# Patient Record
Sex: MALE | Race: WHITE | Employment: UNEMPLOYED | URBAN - METROPOLITAN AREA
[De-identification: names, ages, dates, MRNs, and addresses within clinical notes are randomized per-mention and may not be internally consistent; named-entity substitution may affect disease eponyms.]

---

## 2020-12-21 ENCOUNTER — APPOINTMENT (OUTPATIENT)
Dept: GENERAL RADIOLOGY | Age: 55
DRG: 246 | End: 2020-12-21

## 2020-12-21 ENCOUNTER — HOSPITAL ENCOUNTER (INPATIENT)
Age: 55
LOS: 9 days | Discharge: HOME | DRG: 246 | End: 2020-12-30
Attending: EMERGENCY MEDICINE | Admitting: FAMILY MEDICINE

## 2020-12-21 DIAGNOSIS — I50.9 CONGESTIVE HEART FAILURE, UNSPECIFIED HF CHRONICITY, UNSPECIFIED HEART FAILURE TYPE (HCC): ICD-10-CM

## 2020-12-21 DIAGNOSIS — I48.91 ATRIAL FIBRILLATION, UNSPECIFIED TYPE (HCC): Primary | ICD-10-CM

## 2020-12-21 PROBLEM — K59.00 CONSTIPATION: Status: ACTIVE | Noted: 2020-12-21

## 2020-12-21 PROBLEM — R79.89 TSH ELEVATION: Status: ACTIVE | Noted: 2020-12-21

## 2020-12-21 PROBLEM — R79.89 ELEVATED TROPONIN: Status: ACTIVE | Noted: 2020-12-21

## 2020-12-21 PROBLEM — R77.8 ELEVATED TROPONIN: Status: ACTIVE | Noted: 2020-12-21

## 2020-12-21 PROBLEM — E66.813 CLASS 3 SEVERE OBESITY DUE TO EXCESS CALORIES WITH BODY MASS INDEX (BMI) OF 40.0 TO 44.9 IN ADULT (HCC): Status: ACTIVE | Noted: 2020-12-21

## 2020-12-21 PROBLEM — R60.9 EDEMA: Status: ACTIVE | Noted: 2020-12-21

## 2020-12-21 PROBLEM — R60.1 ANASARCA: Status: ACTIVE | Noted: 2020-12-21

## 2020-12-21 PROBLEM — E66.01 CLASS 3 SEVERE OBESITY DUE TO EXCESS CALORIES WITH BODY MASS INDEX (BMI) OF 40.0 TO 44.9 IN ADULT (HCC): Status: ACTIVE | Noted: 2020-12-21

## 2020-12-21 PROBLEM — E87.5 HYPERKALEMIA: Status: ACTIVE | Noted: 2020-12-21

## 2020-12-21 PROBLEM — E03.8 OTHER SPECIFIED HYPOTHYROIDISM: Status: ACTIVE | Noted: 2020-12-21

## 2020-12-21 PROBLEM — R79.89 ELEVATED SERUM CREATININE: Status: ACTIVE | Noted: 2020-12-21

## 2020-12-21 LAB
ABSOLUTE EOS #: 0.07 K/UL (ref 0–0.4)
ABSOLUTE IMMATURE GRANULOCYTE: 0 K/UL (ref 0–0.3)
ABSOLUTE LYMPH #: 0.85 K/UL (ref 1–4.8)
ABSOLUTE MONO #: 0.72 K/UL (ref 0.1–0.8)
ALBUMIN SERPL-MCNC: 3.6 G/DL (ref 3.5–5.2)
ALBUMIN/GLOBULIN RATIO: 1.2 (ref 1–2.5)
ALP BLD-CCNC: 119 U/L (ref 40–129)
ALT SERPL-CCNC: 18 U/L (ref 5–41)
ANION GAP SERPL CALCULATED.3IONS-SCNC: 15 MMOL/L (ref 9–17)
AST SERPL-CCNC: 24 U/L
BASOPHILS # BLD: 1 % (ref 0–2)
BASOPHILS ABSOLUTE: 0.07 K/UL (ref 0–0.2)
BILIRUB SERPL-MCNC: 2.07 MG/DL (ref 0.3–1.2)
BNP INTERPRETATION: ABNORMAL
BUN BLDV-MCNC: 16 MG/DL (ref 6–20)
BUN/CREAT BLD: ABNORMAL (ref 9–20)
CALCIUM SERPL-MCNC: 9.3 MG/DL (ref 8.6–10.4)
CHLORIDE BLD-SCNC: 100 MMOL/L (ref 98–107)
CO2: 20 MMOL/L (ref 20–31)
CREAT SERPL-MCNC: 1.22 MG/DL (ref 0.7–1.2)
DIFFERENTIAL TYPE: ABNORMAL
EOSINOPHILS RELATIVE PERCENT: 1 % (ref 1–4)
GFR AFRICAN AMERICAN: >60 ML/MIN
GFR NON-AFRICAN AMERICAN: >60 ML/MIN
GFR SERPL CREATININE-BSD FRML MDRD: ABNORMAL ML/MIN/{1.73_M2}
GFR SERPL CREATININE-BSD FRML MDRD: ABNORMAL ML/MIN/{1.73_M2}
GLUCOSE BLD-MCNC: 122 MG/DL (ref 70–99)
HCT VFR BLD CALC: 47.1 % (ref 40.7–50.3)
HEMOGLOBIN: 14.4 G/DL (ref 13–17)
IMMATURE GRANULOCYTES: 0 %
INR BLD: 1.3
LYMPHOCYTES # BLD: 13 % (ref 24–44)
MCH RBC QN AUTO: 31.4 PG (ref 25.2–33.5)
MCHC RBC AUTO-ENTMCNC: 30.6 G/DL (ref 28.4–34.8)
MCV RBC AUTO: 102.8 FL (ref 82.6–102.9)
MONOCYTES # BLD: 11 % (ref 1–7)
MORPHOLOGY: ABNORMAL
MORPHOLOGY: ABNORMAL
NRBC AUTOMATED: 0 PER 100 WBC
PARTIAL THROMBOPLASTIN TIME: 23.4 SEC (ref 20.5–30.5)
PARTIAL THROMBOPLASTIN TIME: 88 SEC (ref 20.5–30.5)
PDW BLD-RTO: 12.6 % (ref 11.8–14.4)
PLATELET # BLD: 153 K/UL (ref 138–453)
PLATELET ESTIMATE: ABNORMAL
PMV BLD AUTO: 12 FL (ref 8.1–13.5)
POTASSIUM SERPL-SCNC: 5.4 MMOL/L (ref 3.7–5.3)
PRO-BNP: 6731 PG/ML
PROTHROMBIN TIME: 13.3 SEC (ref 9–12)
RBC # BLD: 4.58 M/UL (ref 4.21–5.77)
RBC # BLD: ABNORMAL 10*6/UL
SARS-COV-2, RAPID: NOT DETECTED
SARS-COV-2: NORMAL
SARS-COV-2: NORMAL
SEG NEUTROPHILS: 74 % (ref 36–66)
SEGMENTED NEUTROPHILS ABSOLUTE COUNT: 4.79 K/UL (ref 1.8–7.7)
SODIUM BLD-SCNC: 135 MMOL/L (ref 135–144)
SOURCE: NORMAL
THYROXINE, FREE: 0.96 NG/DL (ref 0.93–1.7)
TOTAL PROTEIN: 6.6 G/DL (ref 6.4–8.3)
TROPONIN INTERP: ABNORMAL
TROPONIN T: ABNORMAL NG/ML
TROPONIN, HIGH SENSITIVITY: 75 NG/L (ref 0–22)
TROPONIN, HIGH SENSITIVITY: 77 NG/L (ref 0–22)
TROPONIN, HIGH SENSITIVITY: 81 NG/L (ref 0–22)
TROPONIN, HIGH SENSITIVITY: 83 NG/L (ref 0–22)
TSH SERPL DL<=0.05 MIU/L-ACNC: 12.62 MIU/L (ref 0.3–5)
WBC # BLD: 6.5 K/UL (ref 3.5–11.3)
WBC # BLD: ABNORMAL 10*3/UL

## 2020-12-21 PROCEDURE — 99285 EMERGENCY DEPT VISIT HI MDM: CPT

## 2020-12-21 PROCEDURE — 2580000003 HC RX 258: Performed by: STUDENT IN AN ORGANIZED HEALTH CARE EDUCATION/TRAINING PROGRAM

## 2020-12-21 PROCEDURE — 84439 ASSAY OF FREE THYROXINE: CPT

## 2020-12-21 PROCEDURE — 6370000000 HC RX 637 (ALT 250 FOR IP): Performed by: NURSE PRACTITIONER

## 2020-12-21 PROCEDURE — U0003 INFECTIOUS AGENT DETECTION BY NUCLEIC ACID (DNA OR RNA); SEVERE ACUTE RESPIRATORY SYNDROME CORONAVIRUS 2 (SARS-COV-2) (CORONAVIRUS DISEASE [COVID-19]), AMPLIFIED PROBE TECHNIQUE, MAKING USE OF HIGH THROUGHPUT TECHNOLOGIES AS DESCRIBED BY CMS-2020-01-R: HCPCS

## 2020-12-21 PROCEDURE — 83880 ASSAY OF NATRIURETIC PEPTIDE: CPT

## 2020-12-21 PROCEDURE — 85730 THROMBOPLASTIN TIME PARTIAL: CPT

## 2020-12-21 PROCEDURE — 71045 X-RAY EXAM CHEST 1 VIEW: CPT

## 2020-12-21 PROCEDURE — 85610 PROTHROMBIN TIME: CPT

## 2020-12-21 PROCEDURE — 96365 THER/PROPH/DIAG IV INF INIT: CPT

## 2020-12-21 PROCEDURE — 84443 ASSAY THYROID STIM HORMONE: CPT

## 2020-12-21 PROCEDURE — 93005 ELECTROCARDIOGRAM TRACING: CPT | Performed by: STUDENT IN AN ORGANIZED HEALTH CARE EDUCATION/TRAINING PROGRAM

## 2020-12-21 PROCEDURE — 2500000003 HC RX 250 WO HCPCS: Performed by: STUDENT IN AN ORGANIZED HEALTH CARE EDUCATION/TRAINING PROGRAM

## 2020-12-21 PROCEDURE — 80053 COMPREHEN METABOLIC PANEL: CPT

## 2020-12-21 PROCEDURE — APPSS45 APP SPLIT SHARED TIME 31-45 MINUTES: Performed by: NURSE PRACTITIONER

## 2020-12-21 PROCEDURE — 74018 RADEX ABDOMEN 1 VIEW: CPT

## 2020-12-21 PROCEDURE — 85025 COMPLETE CBC W/AUTO DIFF WBC: CPT

## 2020-12-21 PROCEDURE — 96374 THER/PROPH/DIAG INJ IV PUSH: CPT

## 2020-12-21 PROCEDURE — 6360000002 HC RX W HCPCS: Performed by: STUDENT IN AN ORGANIZED HEALTH CARE EDUCATION/TRAINING PROGRAM

## 2020-12-21 PROCEDURE — 2060000000 HC ICU INTERMEDIATE R&B

## 2020-12-21 PROCEDURE — 6370000000 HC RX 637 (ALT 250 FOR IP): Performed by: INTERNAL MEDICINE

## 2020-12-21 PROCEDURE — 6360000002 HC RX W HCPCS: Performed by: NURSE PRACTITIONER

## 2020-12-21 PROCEDURE — 99222 1ST HOSP IP/OBS MODERATE 55: CPT | Performed by: INTERNAL MEDICINE

## 2020-12-21 PROCEDURE — U0002 COVID-19 LAB TEST NON-CDC: HCPCS

## 2020-12-21 PROCEDURE — 84484 ASSAY OF TROPONIN QUANT: CPT

## 2020-12-21 RX ORDER — PROMETHAZINE HYDROCHLORIDE 12.5 MG/1
12.5 TABLET ORAL EVERY 6 HOURS PRN
Status: DISCONTINUED | OUTPATIENT
Start: 2020-12-21 | End: 2020-12-30 | Stop reason: HOSPADM

## 2020-12-21 RX ORDER — LISINOPRIL 10 MG/1
5 TABLET ORAL DAILY
Status: DISCONTINUED | OUTPATIENT
Start: 2020-12-22 | End: 2020-12-30 | Stop reason: HOSPADM

## 2020-12-21 RX ORDER — HEPARIN SODIUM 1000 [USP'U]/ML
4000 INJECTION, SOLUTION INTRAVENOUS; SUBCUTANEOUS PRN
Status: DISCONTINUED | OUTPATIENT
Start: 2020-12-21 | End: 2020-12-24

## 2020-12-21 RX ORDER — HEPARIN SODIUM 1000 [USP'U]/ML
4000 INJECTION, SOLUTION INTRAVENOUS; SUBCUTANEOUS ONCE
Status: COMPLETED | OUTPATIENT
Start: 2020-12-21 | End: 2020-12-21

## 2020-12-21 RX ORDER — HEPARIN SODIUM 1000 [USP'U]/ML
2000 INJECTION, SOLUTION INTRAVENOUS; SUBCUTANEOUS PRN
Status: DISCONTINUED | OUTPATIENT
Start: 2020-12-21 | End: 2020-12-24

## 2020-12-21 RX ORDER — HEPARIN SODIUM 10000 [USP'U]/100ML
6.88 INJECTION, SOLUTION INTRAVENOUS CONTINUOUS
Status: DISCONTINUED | OUTPATIENT
Start: 2020-12-21 | End: 2020-12-24

## 2020-12-21 RX ORDER — SODIUM CHLORIDE 0.9 % (FLUSH) 0.9 %
10 SYRINGE (ML) INJECTION PRN
Status: DISCONTINUED | OUTPATIENT
Start: 2020-12-21 | End: 2020-12-27

## 2020-12-21 RX ORDER — POLYETHYLENE GLYCOL 3350 17 G/17G
17 POWDER, FOR SOLUTION ORAL DAILY PRN
Status: DISCONTINUED | OUTPATIENT
Start: 2020-12-21 | End: 2020-12-30 | Stop reason: HOSPADM

## 2020-12-21 RX ORDER — METOPROLOL TARTRATE 50 MG/1
25 TABLET, FILM COATED ORAL 2 TIMES DAILY
Status: DISCONTINUED | OUTPATIENT
Start: 2020-12-21 | End: 2020-12-23

## 2020-12-21 RX ORDER — DILTIAZEM HYDROCHLORIDE 5 MG/ML
10 INJECTION INTRAVENOUS ONCE
Status: COMPLETED | OUTPATIENT
Start: 2020-12-21 | End: 2020-12-21

## 2020-12-21 RX ORDER — LEVOTHYROXINE SODIUM 0.05 MG/1
50 TABLET ORAL DAILY
Status: DISCONTINUED | OUTPATIENT
Start: 2020-12-22 | End: 2020-12-30 | Stop reason: HOSPADM

## 2020-12-21 RX ORDER — DOCUSATE SODIUM 100 MG/1
100 CAPSULE, LIQUID FILLED ORAL 2 TIMES DAILY
COMMUNITY

## 2020-12-21 RX ORDER — MORPHINE SULFATE 4 MG/ML
4 INJECTION, SOLUTION INTRAMUSCULAR; INTRAVENOUS ONCE
Status: COMPLETED | OUTPATIENT
Start: 2020-12-21 | End: 2020-12-21

## 2020-12-21 RX ORDER — SODIUM POLYSTYRENE SULFONATE 15 G/60ML
15 SUSPENSION ORAL; RECTAL ONCE
Status: COMPLETED | OUTPATIENT
Start: 2020-12-21 | End: 2020-12-21

## 2020-12-21 RX ORDER — SODIUM CHLORIDE 0.9 % (FLUSH) 0.9 %
10 SYRINGE (ML) INJECTION EVERY 12 HOURS SCHEDULED
Status: DISCONTINUED | OUTPATIENT
Start: 2020-12-21 | End: 2020-12-22

## 2020-12-21 RX ORDER — FUROSEMIDE 10 MG/ML
40 INJECTION INTRAMUSCULAR; INTRAVENOUS ONCE
Status: COMPLETED | OUTPATIENT
Start: 2020-12-21 | End: 2020-12-21

## 2020-12-21 RX ORDER — FUROSEMIDE 10 MG/ML
40 INJECTION INTRAMUSCULAR; INTRAVENOUS 2 TIMES DAILY
Status: DISCONTINUED | OUTPATIENT
Start: 2020-12-21 | End: 2020-12-22

## 2020-12-21 RX ORDER — ACETAMINOPHEN 325 MG/1
650 TABLET ORAL EVERY 6 HOURS PRN
Status: DISCONTINUED | OUTPATIENT
Start: 2020-12-21 | End: 2020-12-30 | Stop reason: HOSPADM

## 2020-12-21 RX ORDER — DOCUSATE SODIUM 100 MG/1
100 CAPSULE, LIQUID FILLED ORAL 2 TIMES DAILY
Status: DISCONTINUED | OUTPATIENT
Start: 2020-12-21 | End: 2020-12-30 | Stop reason: HOSPADM

## 2020-12-21 RX ORDER — ONDANSETRON 2 MG/ML
4 INJECTION INTRAMUSCULAR; INTRAVENOUS EVERY 6 HOURS PRN
Status: DISCONTINUED | OUTPATIENT
Start: 2020-12-21 | End: 2020-12-30 | Stop reason: HOSPADM

## 2020-12-21 RX ORDER — ACETAMINOPHEN 650 MG/1
650 SUPPOSITORY RECTAL EVERY 6 HOURS PRN
Status: DISCONTINUED | OUTPATIENT
Start: 2020-12-21 | End: 2020-12-30 | Stop reason: HOSPADM

## 2020-12-21 RX ADMIN — DILTIAZEM HYDROCHLORIDE 10 MG: 5 INJECTION INTRAVENOUS at 14:56

## 2020-12-21 RX ADMIN — SODIUM POLYSTYRENE SULFONATE 15 G: 15 SUSPENSION ORAL; RECTAL at 23:04

## 2020-12-21 RX ADMIN — DILTIAZEM HYDROCHLORIDE 5 MG/HR: 5 INJECTION INTRAVENOUS at 15:56

## 2020-12-21 RX ADMIN — HEPARIN SODIUM 6.88 UNITS/KG/HR: 10000 INJECTION, SOLUTION INTRAVENOUS at 17:01

## 2020-12-21 RX ADMIN — FUROSEMIDE 40 MG: 10 INJECTION, SOLUTION INTRAMUSCULAR; INTRAVENOUS at 17:01

## 2020-12-21 RX ADMIN — MORPHINE SULFATE 4 MG: 4 INJECTION INTRAVENOUS at 15:35

## 2020-12-21 RX ADMIN — FUROSEMIDE 40 MG: 10 INJECTION, SOLUTION INTRAMUSCULAR; INTRAVENOUS at 18:46

## 2020-12-21 RX ADMIN — DOCUSATE SODIUM 100 MG: 100 CAPSULE, LIQUID FILLED ORAL at 23:04

## 2020-12-21 RX ADMIN — METOPROLOL TARTRATE 25 MG: 50 TABLET, FILM COATED ORAL at 23:05

## 2020-12-21 RX ADMIN — HEPARIN SODIUM 4000 UNITS: 1000 INJECTION INTRAVENOUS; SUBCUTANEOUS at 17:01

## 2020-12-21 ASSESSMENT — ENCOUNTER SYMPTOMS
ABDOMINAL DISTENTION: 1
RHINORRHEA: 0
VOMITING: 0
VOMITING: 1
CHEST TIGHTNESS: 0
BLOOD IN STOOL: 0
EYE PAIN: 0
NAUSEA: 1
CONSTIPATION: 1
COUGH: 0
ABDOMINAL PAIN: 0
SHORTNESS OF BREATH: 1
EYE DISCHARGE: 0
NAUSEA: 0
EYES NEGATIVE: 1
WHEEZING: 0
PHOTOPHOBIA: 0
ALLERGIC/IMMUNOLOGIC NEGATIVE: 1
DIARRHEA: 0

## 2020-12-21 ASSESSMENT — PAIN SCALES - GENERAL: PAINLEVEL_OUTOF10: 6

## 2020-12-21 ASSESSMENT — PAIN DESCRIPTION - LOCATION: LOCATION: PENIS;PELVIS

## 2020-12-21 ASSESSMENT — PAIN DESCRIPTION - FREQUENCY: FREQUENCY: INTERMITTENT

## 2020-12-21 ASSESSMENT — PAIN DESCRIPTION - DESCRIPTORS: DESCRIPTORS: BURNING

## 2020-12-21 ASSESSMENT — PAIN DESCRIPTION - PAIN TYPE: TYPE: ACUTE PAIN

## 2020-12-21 NOTE — ED PROVIDER NOTES
[facility] ED  Emergency Department Encounter  Emergency Medicine Resident     Pt Name: Janie Ybarra  MRN: [id number]  [place] 1965  Date of evaluation: 12/21/20  PCP:  No primary care provider on file. CHIEF COMPLAINT       Chief Complaint   Patient presents with    Chest Pain     new onset a-fib with rvr       HISTORY OFPRESENT ILLNESS  (Location/Symptom, Timing/Onset, Context/Setting, Quality, Duration, Modifying Factors,Severity.)      Janie Ybarra is a 54 y. o.yo male no significant past medical history no current medications who presents with acute onset of heart fluttering, patient called EMS due to feeling as if his heart was racing and shortness of breath. EMS arrived and patient was noted to be in A. fib with RVR. No history of A. Fib. Patient states he has been having bilateral lower extremity swelling for the past few weeks and the past few days patient noted that his swelling has extended up his legs and into his scrotum and penis. Patient stating he is having pain in scrotum and penis although he is still making urine. Pressure type pain in scrotum. Denies chest pain at this time. Does have shortness of breath. 10/10 pain. Patient does state that he has had about a 60 pound weight gain in the past few months, also has had significant constipation for the past few weeks as well. Does not have a primary care physician, does not follow with a physician, denies any home medications again. PAST MEDICAL / SURGICAL / SOCIAL / FAMILY HISTORY      has no past medical history on file. has a past surgical history that includes Elbow surgery. Social:  reports that he has never smoked. His smokeless tobacco use includes chew. He reports current alcohol use of about 30.0 standard drinks of alcohol per week. He reports that he does not use drugs.     Family Hx:   Family History   Problem Relation Age of Onset    Atrial Fibrillation Mother     Stroke Father         Allergies: Seasonal    Home Medications:  Prior to Admission medications    Medication Sig Start Date End Date Taking? Authorizing Provider   docusate sodium (COLACE) 100 MG capsule Take 100 mg by mouth 2 times daily    Historical Provider, MD       REVIEW OFSYSTEMS    (2-9 systems for level 4, 10 or more for level 5)      Review of Systems   Constitutional: Negative for chills and fever. HENT: Negative for congestion, ear pain and rhinorrhea. Eyes: Negative for pain and discharge. Respiratory: Positive for shortness of breath. Negative for cough. Cardiovascular: Positive for palpitations. Negative for chest pain. Gastrointestinal: Positive for constipation. Negative for abdominal pain, diarrhea, nausea and vomiting. Genitourinary: Positive for penile pain and scrotal swelling. Negative for difficulty urinating and hematuria. Musculoskeletal: Negative for arthralgias and myalgias. Skin: Negative for rash and wound. Neurological: Negative for light-headedness and headaches. PHYSICAL EXAM   (up to 7 for level 4, 8 or more forlevel 5)      INITIAL VITALS:   Vitals:    12/22/20 0231   BP: (!) 150/97   Pulse: 130   Resp:    Temp:    SpO2: 96%        Physical Exam  Vitals signs and nursing note reviewed. Constitutional:       General: He is not in acute distress. Appearance: He is obese. He is not ill-appearing. HENT:      Head: Normocephalic and atraumatic. Nose: Nose normal.      Mouth/Throat:      Mouth: Mucous membranes are moist.      Pharynx: Oropharynx is clear. Eyes:      Pupils: Pupils are equal, round, and reactive to light. Neck:      Musculoskeletal: Normal range of motion. Cardiovascular:      Rate and Rhythm: Tachycardia present. Rhythm irregular. Heart sounds: No murmur. No friction rub. No gallop. Pulmonary:      Effort: Pulmonary effort is normal. No respiratory distress. Breath sounds: Normal breath sounds. No wheezing.    Abdominal:      General: Abdomen is flat. There is no distension. Palpations: Abdomen is soft. Tenderness: There is no abdominal tenderness. Comments: Obese, edema at lower quadrants noted. Distended though not acute abdomen   Genitourinary:     Comments: Scrotal edema, penile edema noted. Scrotum soft  Musculoskeletal:      Right lower leg: Edema (pitting edema ) present. Left lower leg: Edema (pitting edema ) present. Comments: ansarca noted in bilateral lower extremities up to scrotum and penis   Compartments soft  Scrotum soft although tender to palpation. Penile swelling noted. Skin:     General: Skin is warm and dry. Neurological:      General: No focal deficit present. Mental Status: He is alert and oriented to person, place, and time. Comments: Alert and oriented to person place ant time. Mentation intact, conversational.     Psychiatric:         Mood and Affect: Mood normal.         Behavior: Behavior normal.         DIFFERENTIAL  DIAGNOSIS       DDX: A. fib with RVR versus A. fib versus CHF versus ACS versus MI. Initial MDM/Plan: 54 y.o. male who presents with acute onset of heart palpitations with shortness of breath. Upon EMS arrival patient was noted to be in A. fib with RVR. They gave 20 mg of IV Cardizem which brought the rate down from 190 to 130 upon arrival in the emergency department. Upon arrival to the emergency department. Patient was noted to still be in A. fib with RVR with rate at 130 on the monitor and 124 on EKG. Will redose with Cardizem 20 mg IV and place patient on Cardizem drip. Pressure initially 142/111, okay for bolus and drip of Cardizem. No signs otherwise stable SPO2 91% on 2 L nasal cannula. Mentation intact, conversational, alert and oriented.      Plan for EKG, troponin, CBC, CMP, BNP, TSH with reflex, chest x-ray    DIAGNOSTIC RESULTS / EMERGENCYDEPARTMENT COURSE / MDM     LABS:  Labs Reviewed   CBC WITH AUTO DIFFERENTIAL - Abnormal; Notable for the Notable for the following components: Total Bilirubin 1.78 (*)     Bilirubin, Direct 0.64 (*)     Bilirubin, Indirect 1.14 (*)     CREATININE 1.23 (*)     Glucose 115 (*)     All other components within normal limits   TROPONIN - Abnormal; Notable for the following components:    Troponin, High Sensitivity 87 (*)     All other components within normal limits   COVID-19   T4, FREE   APTT   MAGNESIUM   BRAIN NATRIURETIC PEPTIDE   APTT   TROPONIN   APTT   TROPONIN         RADIOLOGY:  Xr Chest (2 Vw)    Result Date: 12/22/2020  EXAMINATION: TWO XRAY VIEWS OF THE CHEST 12/22/2020 8:10 am COMPARISON: Frontal view of the chest December 21, 2020. HISTORY: ORDERING SYSTEM PROVIDED HISTORY: CHF TECHNOLOGIST PROVIDED HISTORY: CHF Reason for Exam: hx. CHF/ AP erect, lateral/ shortness of breath Acuity: Unknown Type of Exam: Unknown FINDINGS: There is a large body habitus and suggestion that the patient may be morbidly obese. Cardiac silhouette is thus magnified. No evidence of pneumothorax. Moderate to large right pleural effusion persists. Central pulmonary vascularity is mildly prominent, greater on the right. Right pleural effusion. Question of whether some of this is loculated. Question of whether there could be a central obstructing process as there is prominence of the right perihilar structures without definite pulmonary congestion. Large body habitus. Xr Abdomen (kub) (single Ap View)    Result Date: 12/21/2020  EXAMINATION: ONE SUPINE XRAY VIEW(S) OF THE ABDOMEN 12/21/2020 9:13 pm COMPARISON: None. HISTORY: ORDERING SYSTEM PROVIDED HISTORY: Distension TECHNOLOGIST PROVIDED HISTORY: Distension Reason for Exam: supine Acuity: Unknown Type of Exam: Unknown FINDINGS: Nonspecific nonobstructive bowel gas pattern. No findings suggest bowel obstruction. Retained stool rectosigmoid junction. Mild degenerate changes. No radiopaque calcifications identified. Nonspecific nonobstructive bowel gas pattern. Xr Chest Portable    Result Date: 12/21/2020  EXAMINATION: ONE XRAY VIEW OF THE CHEST 12/21/2020 3:15 pm COMPARISON: None. HISTORY: ORDERING SYSTEM PROVIDED HISTORY: shortness of breath TECHNOLOGIST PROVIDED HISTORY: shortness of breath FINDINGS: Single portable frontal view of the chest is submitted for review. The cardiac silhouette is enlarged. There is a moderate right-sided pleural effusion and right lower lobe airspace disease. Mild central vascular congestion. No evidence for pneumothorax. Trachea is midline. Osseous structures and soft tissues are grossly intact. Cardiomegaly and pulmonary vascular congestion. Moderate to large right pleural effusion and right lower lobe airspace disease, atelectasis versus pneumonia. EKG  Tachycardic, Atrial Fibrillation noted. Axis normal, no deviation noted. No P waves noted. No MN able to be calculated. Other intervals within normal limits including QRS, QT/QTc  No st elevation, no st depression, no t wave inversion noted     Atrial fibrillation with RVR noted. All EKG's are interpreted by the Emergency Department Physicianwho either signs or Co-signs this chart in the absence of a cardiologist.    EMERGENCY DEPARTMENT COURSE:  ED Course as of Dec 22 0909   Mon Dec 21, 2020   1453 Afib on our EKG patient received 20 mg of Cardizem per EMS rate was 190 and upon our ekg a fib with rate of 126 will provide 20 mg Cardizem bolus and place patient on Cardizem drip. [MA]   [phone] EMS had fluids running, stopped fluids. [MA]   1540 CXR showing cardiomegaly with pulmonary vascular congestion, large right pleural effusion. XR CHEST PORTABLE [MA]   1559 40 mg IV lasix given, patient has significant lower extremity edema. [MA]   1601 Elevated, will repeat and trend. Troponin(!!):    Troponin, High Sensitivity 81(!!)   Troponin T NOT REPORTED   Troponin Interp NOT REPORTED [MA]   1602 Elevated. IV lasix 40 mg already ordered.     Brain

## 2020-12-21 NOTE — ED PROVIDER NOTES
[facility]     Emergency Department     Faculty Attestation    I performed a history and physical examination of the patient and discussed management with the resident. I reviewed the resident´s note and agree with the documented findings and plan of care. Any areas of disagreement are noted on the chart. I was personally present for the key portions of any procedures. I have documented in the chart those procedures where I was not present during the key portions. I have reviewed the emergency nurses triage note. I agree with the chief complaint, past medical history, past surgical history, allergies, medications, social and family history as documented unless otherwise noted below. For Physician Assistant/ Nurse Practitioner cases/documentation I have personally evaluated this patient and have completed at least one if not all key elements of the E/M (history, physical exam, and MDM). Additional findings are as noted. New onset atrial fibrillation, anasarca, patient breath sounds at the bases, heart irregularly irregular without murmurs, heart tones are distant, abdomen is obese and nontender. EKG Interpretation    Interpreted by emergency department physician    Rhythm: Atrial fibrillation  Rate: 126  Axis: normal/50  Ectopy: none  Conduction: normal  ST Segments: no acute change  T Waves: Nonspecific changes   Q Waves: V1    Clinical Impression: Abnormal EKG, new onset atrial fibrillation    Josey Altman, III     Josey Altman MD  12/21/20 0775      CRITICAL CARE: There was a high probability of clinically significant/life threatening deterioration in this patient's condition which required my urgent intervention. Total critical care time was 40 minutes. This excludes any time for separately reportable procedures.           Josey Altman MD  12/21/20 9700

## 2020-12-21 NOTE — ED NOTES
Medicated for scrotal pain due to scrotal edema  Pain 10 /10  Siderails up x2  Call light at side            Rob Walters RN  12/21/20 7533

## 2020-12-21 NOTE — ED NOTES
Bed: 16  Expected date:   Expected time:   Means of arrival:   Comments:  Madeleine Horowitz [facility]  12/21/20 3094

## 2020-12-21 NOTE — ED NOTES
Pt came in via EMS with new onset a-fib and RVR. EMS states he was 190 and after 20 of cardizem went down to 130. Pt is denying chest pain or any other cardiac symptoms.       Ang De Luna RN  12/21/20 3534

## 2020-12-21 NOTE — ED PROVIDER NOTES
[facility] ED  Emergency Department  Emergency Medicine Resident Sign-out     Care of Sabra Villagomez was assumed from Dr. Zeina Butler and is being seen for Chest Pain (new onset a-fib with rvr)  . The patient's initial evaluation and plan have been discussed with the prior provider who initially evaluated the patient.      EMERGENCY DEPARTMENT COURSE / MEDICAL DECISION MAKING:       MEDICATIONS GIVEN:  Orders Placed This Encounter   Medications    FOLLOWED BY Linked Order Group     dilTIAZem injection 10 mg     dilTIAZem 125 mg in dextrose 5 % 125 mL infusion    dilTIAZem injection 10 mg    morphine injection 4 mg    furosemide (LASIX) injection 40 mg    heparin (porcine) injection 4,000 Units    heparin (porcine) injection 4,000 Units    heparin (porcine) injection 2,000 Units    heparin 25,000 units in dextrose 5% 250 mL infusion       LABS / RADIOLOGY:     Labs Reviewed   CBC WITH AUTO DIFFERENTIAL - Abnormal; Notable for the following components:       Result Value    Seg Neutrophils 74 (*)     Lymphocytes 13 (*)     Monocytes 11 (*)     Absolute Lymph # 0.85 (*)     All other components within normal limits   COMPREHENSIVE METABOLIC PANEL W/ REFLEX TO MG FOR LOW K - Abnormal; Notable for the following components:    Glucose 122 (*)     CREATININE 1.22 (*)     Potassium 5.4 (*)     Total Bilirubin 2.07 (*)     All other components within normal limits   TROPONIN - Abnormal; Notable for the following components:    Troponin, High Sensitivity 81 (*)     All other components within normal limits   BRAIN NATRIURETIC PEPTIDE - Abnormal; Notable for the following components:    Pro-BNP 6,731 (*)     All other components within normal limits   TSH WITH REFLEX - Abnormal; Notable for the following components:    TSH 12.62 (*)     All other components within normal limits   PROTIME-INR - Abnormal; Notable for the following components:    Protime 13.3 (*)     All other components within normal limits   COVID-19   T4, FREE   APTT   APTT   APTT   APTT   TROPONIN       Xr Chest Portable    Result Date: 12/21/2020  EXAMINATION: ONE XRAY VIEW OF THE CHEST 12/21/2020 3:15 pm COMPARISON: None. HISTORY: ORDERING SYSTEM PROVIDED HISTORY: shortness of breath TECHNOLOGIST PROVIDED HISTORY: shortness of breath FINDINGS: Single portable frontal view of the chest is submitted for review. The cardiac silhouette is enlarged. There is a moderate right-sided pleural effusion and right lower lobe airspace disease. Mild central vascular congestion. No evidence for pneumothorax. Trachea is midline. Osseous structures and soft tissues are grossly intact. Cardiomegaly and pulmonary vascular congestion. Moderate to large right pleural effusion and right lower lobe airspace disease, atelectasis versus pneumonia. RECENT VITALS:     Temp: 97.7 °F (36.5 °C),  Pulse: 133, Resp: 30, BP: (!) 142/111, SpO2: 91 %    This patient is a 54 y.o. Male with afib rvr new onset. Given cadinzam EMS, bolus here, now on ggt    Vitals stable    Cards consulted, heparin ggt started    Remains awake alert , trop elevated but no STMEI, does have new CHF based on exam and BNP, given lasix, tsh is elevated 12 new onset    [facility]      OUTSTANDING TASKS / RECOMMENDATIONS:    1. FU T4 level  2. bedplacement     FINAL IMPRESSION:     1. Atrial fibrillation, unspecified type ([facility] [place] [address]    2. Congestive heart failure, unspecified HF chronicity, unspecified heart failure type ([facility] [place] [address]        DISPOSITION:         DISPOSITION:  []  Discharge   []  Transfer -    [x]  Admission -     []  Against Medical Advice   []  Eloped   FOLLOW-UP: No follow-up provider specified.    DISCHARGE MEDICATIONS: New Prescriptions    No medications on file           Cesar Mancilla DO  Emergency Medicine Resident  [facility]       Cesar Mancilla [place]  Resident  12/22/20 9946

## 2020-12-21 NOTE — ED PROVIDER NOTES
[facility] HANDOFF       Handoff taken on the following patient from prior Attending Physician:Dr. Betty Treviño  Pt Name: Shweta Groves  PCP:  No primary care provider on file. Attestation  I was available and discussed any additional care issues that arose and coordinated the management plans with the resident(s) caring for the patient during my duty period. Any areas of disagreement with resident's documentation of care or procedures are noted on the chart. I was personally present for the key portions of any/all procedures during my duty period. I have documented in the chart those procedures where I was not present during the key portions. CHIEF COMPLAINT       Chief Complaint   Patient presents with    Chest Pain     new onset a-fib with rvr         CURRENT MEDICATIONS     Previous Medications  Previous Medications    DOCUSATE SODIUM (COLACE) 100 MG CAPSULE    Take 100 mg by mouth 2 times daily       Encounter Medications  Orders Placed This Encounter   Medications    FOLLOWED BY Linked Order Group     dilTIAZem injection 10 mg     dilTIAZem 125 mg in dextrose 5 % 125 mL infusion    dilTIAZem injection 10 mg    morphine injection 4 mg    furosemide (LASIX) injection 40 mg    heparin (porcine) injection 4,000 Units    heparin (porcine) injection 4,000 Units    heparin (porcine) injection 2,000 Units    heparin 25,000 units in dextrose 5% 250 mL infusion       ALLERGIES     is allergic to seasonal.      RECENT VITALS:   Temp: 97.7 °F (36.5 °C),  Pulse: 133, Resp: 30, BP: (!) 142/111    RADIOLOGY:   XR CHEST PORTABLE   Final Result   Cardiomegaly and pulmonary vascular congestion. Moderate to large right pleural effusion and right lower lobe airspace   disease, atelectasis versus pneumonia.              LABS:  Labs Reviewed   CBC WITH AUTO DIFFERENTIAL - Abnormal; Notable for the following components:       Result Value    Seg Neutrophils 74 (*)     Lymphocytes 13

## 2020-12-21 NOTE — H&P
[facility]  Office: [address], DO, Valerie Johnson, DO, Khushboo Perez, DO, Luis Fernando Clark, DO, Bob Alvarez MD, Diana Vaughn MD, Faustina Chavez MD, Cee Jimenez MD, Chinmay Banks MD, Lroee Clark MD, Lydia Abraham MD, Marshall Davila MD, Troy Morelos MD, Marc Page, DO, Tanja Phan MD, Radha Markham MD, Jamie Irvin, DO, Jeremy Ballesteros MD,  Phyllis Bolden DO, Deysi García MD, Darryle Lew, MD, Vivian Reddy [facility], [facility] Irian, [facility], Ambar Doe, CNP, Michaela Blunt, CNS, Usha De Paz, CNP, Margarita Gasca, CNP, Sim Martinez, CNP, Kushal Reddy, CNP, Nikki Queen, CNP, Bahman Alford PA-C, Kelby Proctor, [facility], Keila Lyons, CNP, Moriah Koehler, CNP, Mikie Narvaez, CNP, Samantha Lino, CNP, Johnna Singh, [address]    HISTORY AND PHYSICAL EXAMINATION            Date:   12/21/2020  Patient name:  Lopez Garcia  Date of admission:  12/21/2020  2:39 PM  MRN:   [id number]  Account:  [de-identified]  YOB: 1965  PCP:    No primary care provider on file. Room:   [facility]  Code Status:    No Order    Chief Complaint:     Chief Complaint   Patient presents with    Chest Pain     new onset a-fib with rvr       History Obtained From:     patient, electronic medical record    History of Present Illness:     Lopez Garcia is a 54 y.o. Unavailable/unknown male who presents with Chest Pain (new onset a-fib with rvr)   and is admitted to the hospital for the management of New onset of congestive heart failure ([facility] [address] Mr. Eugene Flores is a 55 yo male who called EMS today with c/o  shortness of breath x 1 week with worsening symptoms today. EMS noted patient with heart rate 190, Cardizem 20 mg IV push given. Upon arrival to ED heart rate was noted A. fib in the 130s.  Patient also complains of bilateral lower extremity edema for the past several weeks that it is extended through his hips including his scrotum and penis and subsequent penile pain. He endorses a 60 pound weight gain over the past few months. He believes the symptoms have significantly increased over the last 1 month starting with severe constipation and decreased urinary output. He denies pain, cough, nausea or vomiting, fever or chills. He has no established PCP, denies medication use. ED course of treatment:     Thyroxine, Free0.96   TSH12.62     BKW11nv/dL   CREATININE1.22     Pro-BNP6,731     Troponin, High Sensitivity 81High Panic         Past Medical History:     History reviewed. No pertinent past medical history. Past Surgical History:     Past Surgical History:   Procedure Laterality Date    ELBOW SURGERY          Medications Prior to Admission:     Prior to Admission medications    Medication Sig Start Date End Date Taking? Authorizing Provider   docusate sodium (COLACE) 100 MG capsule Take 100 mg by mouth 2 times daily    Historical Provider, MD        Allergies:     Seasonal    Social History:     Tobacco:    reports that he has never smoked. His smokeless tobacco use includes chew. Alcohol:      reports current alcohol use of about 30.0 standard drinks of alcohol per week. Drug Use:  reports no history of drug use. Family History:     Family History   Problem Relation Age of Onset    Atrial Fibrillation Mother     Stroke Father        Review of Systems:     Positive and Negative as described in HPI. Review of Systems   Constitutional: Positive for activity change, appetite change and fatigue. HENT: Negative. Eyes: Negative. Respiratory: Positive for shortness of breath. Negative for cough and chest tightness. Cardiovascular: Positive for leg swelling. Negative for chest pain and palpitations. Gastrointestinal: Positive for abdominal distention, constipation, nausea and vomiting. Endocrine: Negative.     Genitourinary: Positive for decreased urine volume, difficulty urinating, penile pain, penile swelling and scrotal swelling. Negative for flank pain. Musculoskeletal: Negative. Skin: Negative. Allergic/Immunologic: Negative. Neurological: Negative. Hematological: Negative. Psychiatric/Behavioral: Negative. Physical Exam:   BP (!) 142/111   Pulse 133   Temp 97.7 °F (36.5 °C) (Oral)   Resp 30   Ht 5' 11\" (1.803 m)   Wt (!) 320 lb (145.2 kg)   SpO2 91%   BMI 44.63 kg/m²   Temp (24hrs), Av.7 °F (36.5 °C), Min:97.7 °F (36.5 °C), Max:97.7 °F (36.5 °C)    No results for input(s): POCGLU in the last 72 hours. No intake or output data in the 24 hours ending 20 1654    Physical Exam  Vitals signs and nursing note reviewed. Constitutional:       General: He is not in acute distress. Appearance: He is obese. He is ill-appearing and toxic-appearing. HENT:      Head: Normocephalic. Eyes:      Pupils: Pupils are equal, round, and reactive to light. Neck:      Musculoskeletal: Full passive range of motion without pain. Cardiovascular:      Rate and Rhythm: Tachycardia present. Rhythm irregular. Pulses: Normal pulses. Heart sounds: S1 normal and S2 normal. No friction rub. No S3 or S4 sounds. Pulmonary:      Effort: Pulmonary effort is normal.      Breath sounds: Normal breath sounds. Abdominal:      General: Abdomen is protuberant. Bowel sounds are decreased. Palpations: Abdomen is rigid. There is no fluid wave. Tenderness: There is no abdominal tenderness. There is no right CVA tenderness or left CVA tenderness. Genitourinary:     Penis: Uncircumcised. Swelling present. Musculoskeletal: Normal range of motion. Right lower leg: 3+ Pitting Edema present. Left lower leg: 3+ Edema present. Lymphadenopathy:      Comments: No lymphadenopathy   Skin:     General: Skin is warm and dry. Capillary Refill: Capillary refill takes less than 2 seconds. Neurological:      General: No focal deficit present.       Mental Status: He is alert and oriented to person, place, and time. Psychiatric:         Attention and Perception: Attention and perception normal.         Speech: Speech normal.         Behavior: Behavior is cooperative.          Investigations:      Laboratory Testing:  Recent Results (from the past 24 hour(s))   EKG 12 Lead    Collection Time: 12/21/20  2:41 PM   Result Value Ref Range    Ventricular Rate 126 BPM    Atrial Rate 117 BPM    QRS Duration 86 ms    Q-T Interval 322 ms    QTc Calculation (Bazett) 466 ms    R Axis 50 degrees    T Axis -142 degrees   CBC Auto Differential    Collection Time: 12/21/20  3:12 PM   Result Value Ref Range    WBC 6.5 3.5 - 11.3 k/uL    RBC 4.58 4.21 - 5.77 m/uL    Hemoglobin 14.4 13.0 - 17.0 g/dL    Hematocrit 47.1 40.7 - 50.3 %    .8 82.6 - 102.9 fL    MCH 31.4 25.2 - 33.5 pg    MCHC 30.6 28.4 - 34.8 g/dL    RDW 12.6 11.8 - 14.4 %    Platelets 828 960 - 783 k/uL    MPV 12.0 8.1 - 13.5 fL    NRBC Automated 0.0 0.0 per 100 WBC    Differential Type NOT REPORTED     WBC Morphology NOT REPORTED     RBC Morphology NOT REPORTED     Platelet Estimate NOT REPORTED     Immature Granulocytes 0 0 %    Seg Neutrophils 74 (H) 36 - 66 %    Lymphocytes 13 (L) 24 - 44 %    Monocytes 11 (H) 1 - 7 %    Eosinophils % 1 1 - 4 %    Basophils 1 0 - 2 %    Absolute Immature Granulocyte 0.00 0.00 - 0.30 k/uL    Segs Absolute 4.79 1.8 - 7.7 k/uL    Absolute Lymph # 0.85 (L) 1.0 - 4.8 k/uL    Absolute Mono # 0.72 0.1 - 0.8 k/uL    Absolute Eos # 0.07 0.0 - 0.4 k/uL    Basophils Absolute 0.07 0.0 - 0.2 k/uL    Morphology MACROCYTOSIS PRESENT     Morphology LARGE PLATELETS PRESENT    Comprehensive Metabolic Panel w/ Reflex to MG    Collection Time: 12/21/20  3:12 PM   Result Value Ref Range    Glucose 122 (H) 70 - 99 mg/dL    BUN 16 6 - 20 mg/dL    CREATININE 1.22 (H) 0.70 - 1.20 mg/dL    Bun/Cre Ratio NOT REPORTED 9 - 20    Calcium 9.3 8.6 - 10.4 mg/dL    Sodium 135 135 - 144 mmol/L    Potassium 5.4 (H) 3.7 - 5.3 mmol/L Chloride 100 98 - 107 mmol/L    CO2 20 20 - 31 mmol/L    Anion Gap 15 9 - 17 mmol/L    Alkaline Phosphatase 119 40 - 129 U/L    ALT 18 5 - 41 U/L    AST 24 <40 U/L    Total Bilirubin 2.07 (H) 0.3 - 1.2 mg/dL    Total Protein 6.6 6.4 - 8.3 g/dL    Alb 3.6 3.5 - 5.2 g/dL    Albumin/Globulin Ratio 1.2 1.0 - 2.5    GFR Non-African American >60 >60 mL/min    GFR African American >60 >60 mL/min    GFR Comment          GFR Staging NOT REPORTED    Troponin    Collection Time: 12/21/20  3:12 PM   Result Value Ref Range    Troponin, High Sensitivity 81 (HH) 0 - 22 ng/L    Troponin T NOT REPORTED <0.03 ng/mL    Troponin Interp NOT REPORTED    Brain Natriuretic Peptide    Collection Time: 12/21/20  3:12 PM   Result Value Ref Range    Pro-BNP 6,731 (H) <300 pg/mL    BNP Interpretation Pro-BNP Reference Range:    TSH with Reflex    Collection Time: 12/21/20  3:12 PM   Result Value Ref Range    TSH 12.62 (H) 0.30 - 5.00 mIU/L   T4, Free    Collection Time: 12/21/20  3:12 PM   Result Value Ref Range    Thyroxine, Free 0.96 0.93 - 1.70 ng/dL   Protime-INR    Collection Time: 12/21/20  3:12 PM   Result Value Ref Range    Protime 13.3 (H) 9.0 - 12.0 sec    INR 1.3    APTT    Collection Time: 12/21/20  3:12 PM   Result Value Ref Range    PTT 23.4 20.5 - 30.5 sec   COVID-19    Collection Time: 12/21/20  4:02 PM    Specimen: Other   Result Value Ref Range    SARS-CoV-2          SARS-CoV-2, Rapid Not Detected Not Detected    Source . NASOPHARYNGEAL SWAB     SARS-CoV-2             Imaging/Diagnostics:  Xr Chest Portable    Result Date: 12/21/2020  Cardiomegaly and pulmonary vascular congestion. Moderate to large right pleural effusion and right lower lobe airspace disease, atelectasis versus pneumonia.        Assessment :      Hospital Problems           Last Modified POA    * (Principal) New onset of congestive heart failure ([facility] [address] 12/21/2020 Yes    New onset a-fib ([facility] [address] 12/21/2020 Yes    Other specified hypothyroidism 12/21/2020 Yes Elevated troponin 12/21/2020 Yes    Elevated serum creatinine 12/21/2020 Yes    Class 3 severe obesity due to excess calories with body mass index (BMI) of 40.0 to 44.9 in adult [facility]) 12/21/2020 Yes          Plan:     Patient status inpatient in the Progressive Unit/Step down    1. Inpatient consult to cardiology. Appreciate input. 2. Continue Cardizem drip titrate for heart rate less than 100.    3. Heparin drip, low-dose with serial PTT lab draw for anticoagulation. Transition to home anticoagulation of cardiology choice. 4. Start Lopressor 25 mg p.o. BID per cardiology recommendations. 5. Trend troponin. Q. 6 hours x 3.  6. Trend BN P. Lasix 40 mg IV twice daily. Strict I/O  7. Establish outpatient care to start Synthroid and trend thyroid levels. 8. PT/OT  9. DVT prophylaxis. We will continue heparin drip for chemical anticoagulation. 10. GI prophylaxis. Protonix 40 mg p.o. daily  11. Case management for home-going needs and safety. Consultations:   IP CONSULT TO CARDIOLOGY  IP CONSULT TO HOSPITALIST  IP CONSULT TO HEART FAILURE NURSE/COORDINATOR  IP CONSULT TO DIETITIAN     Patient is admitted as inpatient status because of co-morbidities listed above, severity of signs and symptoms as outlined, requirement for current medical therapies and most importantly because of direct risk to patient if care not provided in a hospital setting. Expected length of stay > 48 hours. LAUREN Rodrigues NP  12/21/2020  4:54 PM    Copy sent to Dr. Mishra primary care provider on file.

## 2020-12-22 ENCOUNTER — APPOINTMENT (OUTPATIENT)
Dept: GENERAL RADIOLOGY | Age: 55
DRG: 246 | End: 2020-12-22

## 2020-12-22 ENCOUNTER — APPOINTMENT (OUTPATIENT)
Dept: ULTRASOUND IMAGING | Age: 55
DRG: 246 | End: 2020-12-22

## 2020-12-22 PROBLEM — I50.23 ACUTE ON CHRONIC SYSTOLIC CONGESTIVE HEART FAILURE (HCC): Status: ACTIVE | Noted: 2020-12-22

## 2020-12-22 LAB
ALBUMIN SERPL-MCNC: 3.5 G/DL (ref 3.5–5.2)
ALBUMIN/GLOBULIN RATIO: 1.1 (ref 1–2.5)
ALP BLD-CCNC: 108 U/L (ref 40–129)
ALT SERPL-CCNC: 18 U/L (ref 5–41)
ANION GAP SERPL CALCULATED.3IONS-SCNC: 14 MMOL/L (ref 9–17)
AST SERPL-CCNC: 27 U/L
BILIRUB SERPL-MCNC: 1.78 MG/DL (ref 0.3–1.2)
BILIRUBIN DIRECT: 0.64 MG/DL
BILIRUBIN URINE: NEGATIVE
BILIRUBIN, INDIRECT: 1.14 MG/DL (ref 0–1)
BNP INTERPRETATION: ABNORMAL
BUN BLDV-MCNC: 17 MG/DL (ref 6–20)
CALCIUM SERPL-MCNC: 9.2 MG/DL (ref 8.6–10.4)
CHLORIDE BLD-SCNC: 101 MMOL/L (ref 98–107)
CHOLESTEROL/HDL RATIO: 3.2
CHOLESTEROL: 126 MG/DL
CO2: 20 MMOL/L (ref 20–31)
COLOR: YELLOW
COMMENT UA: NORMAL
CREAT SERPL-MCNC: 1.23 MG/DL (ref 0.7–1.2)
EKG ATRIAL RATE: 117 BPM
EKG Q-T INTERVAL: 322 MS
EKG QRS DURATION: 86 MS
EKG QTC CALCULATION (BAZETT): 466 MS
EKG R AXIS: 50 DEGREES
EKG T AXIS: -142 DEGREES
EKG VENTRICULAR RATE: 126 BPM
GFR AFRICAN AMERICAN: >60 ML/MIN
GFR NON-AFRICAN AMERICAN: >60 ML/MIN
GFR SERPL CREATININE-BSD FRML MDRD: ABNORMAL ML/MIN/{1.73_M2}
GFR SERPL CREATININE-BSD FRML MDRD: ABNORMAL ML/MIN/{1.73_M2}
GLUCOSE BLD-MCNC: 115 MG/DL (ref 70–99)
GLUCOSE URINE: NEGATIVE
HCT VFR BLD CALC: 43.8 % (ref 40.7–50.3)
HDLC SERPL-MCNC: 39 MG/DL
HEMOGLOBIN: 13.4 G/DL (ref 13–17)
KETONES, URINE: NEGATIVE
LDL CHOLESTEROL: 69 MG/DL (ref 0–130)
LEUKOCYTE ESTERASE, URINE: NEGATIVE
LV EF: 38 %
LVEF MODALITY: NORMAL
MAGNESIUM: 1.9 MG/DL (ref 1.6–2.6)
MCH RBC QN AUTO: 31.8 PG (ref 25.2–33.5)
MCHC RBC AUTO-ENTMCNC: 30.6 G/DL (ref 28.4–34.8)
MCV RBC AUTO: 103.8 FL (ref 82.6–102.9)
NITRITE, URINE: NEGATIVE
NRBC AUTOMATED: 0 PER 100 WBC
PARTIAL THROMBOPLASTIN TIME: 31.6 SEC (ref 20.5–30.5)
PARTIAL THROMBOPLASTIN TIME: 31.7 SEC (ref 20.5–30.5)
PARTIAL THROMBOPLASTIN TIME: 39.9 SEC (ref 20.5–30.5)
PDW BLD-RTO: 12.7 % (ref 11.8–14.4)
PH UA: 5 (ref 5–8)
PLATELET # BLD: 152 K/UL (ref 138–453)
PMV BLD AUTO: 11.7 FL (ref 8.1–13.5)
POTASSIUM SERPL-SCNC: 4.2 MMOL/L (ref 3.7–5.3)
PRO-BNP: 6251 PG/ML
PROTEIN UA: NEGATIVE
RBC # BLD: 4.22 M/UL (ref 4.21–5.77)
SODIUM BLD-SCNC: 135 MMOL/L (ref 135–144)
SPECIFIC GRAVITY UA: 1.01 (ref 1–1.03)
TOTAL PROTEIN: 6.8 G/DL (ref 6.4–8.3)
TRIGL SERPL-MCNC: 88 MG/DL
TROPONIN INTERP: ABNORMAL
TROPONIN T: ABNORMAL NG/ML
TROPONIN, HIGH SENSITIVITY: 79 NG/L (ref 0–22)
TROPONIN, HIGH SENSITIVITY: 85 NG/L (ref 0–22)
TROPONIN, HIGH SENSITIVITY: 87 NG/L (ref 0–22)
TURBIDITY: CLEAR
URINE HGB: NEGATIVE
UROBILINOGEN, URINE: NORMAL
VLDLC SERPL CALC-MCNC: ABNORMAL MG/DL (ref 1–30)
WBC # BLD: 5.7 K/UL (ref 3.5–11.3)

## 2020-12-22 PROCEDURE — 2060000000 HC ICU INTERMEDIATE R&B

## 2020-12-22 PROCEDURE — 83735 ASSAY OF MAGNESIUM: CPT

## 2020-12-22 PROCEDURE — 82248 BILIRUBIN DIRECT: CPT

## 2020-12-22 PROCEDURE — 99232 SBSQ HOSP IP/OBS MODERATE 35: CPT | Performed by: INTERNAL MEDICINE

## 2020-12-22 PROCEDURE — 76770 US EXAM ABDO BACK WALL COMP: CPT

## 2020-12-22 PROCEDURE — 6360000002 HC RX W HCPCS: Performed by: NURSE PRACTITIONER

## 2020-12-22 PROCEDURE — 6370000000 HC RX 637 (ALT 250 FOR IP): Performed by: INTERNAL MEDICINE

## 2020-12-22 PROCEDURE — 6370000000 HC RX 637 (ALT 250 FOR IP): Performed by: NURSE PRACTITIONER

## 2020-12-22 PROCEDURE — 6360000002 HC RX W HCPCS: Performed by: INTERNAL MEDICINE

## 2020-12-22 PROCEDURE — 84484 ASSAY OF TROPONIN QUANT: CPT

## 2020-12-22 PROCEDURE — 93306 TTE W/DOPPLER COMPLETE: CPT

## 2020-12-22 PROCEDURE — APPSS30 APP SPLIT SHARED TIME 16-30 MINUTES: Performed by: PHYSICIAN ASSISTANT

## 2020-12-22 PROCEDURE — 2500000003 HC RX 250 WO HCPCS: Performed by: NURSE PRACTITIONER

## 2020-12-22 PROCEDURE — 85730 THROMBOPLASTIN TIME PARTIAL: CPT

## 2020-12-22 PROCEDURE — 2580000003 HC RX 258: Performed by: NURSE PRACTITIONER

## 2020-12-22 PROCEDURE — 83880 ASSAY OF NATRIURETIC PEPTIDE: CPT

## 2020-12-22 PROCEDURE — 80061 LIPID PANEL: CPT

## 2020-12-22 PROCEDURE — 81003 URINALYSIS AUTO W/O SCOPE: CPT

## 2020-12-22 PROCEDURE — 80053 COMPREHEN METABOLIC PANEL: CPT

## 2020-12-22 PROCEDURE — 71046 X-RAY EXAM CHEST 2 VIEWS: CPT

## 2020-12-22 PROCEDURE — 93010 ELECTROCARDIOGRAM REPORT: CPT | Performed by: INTERNAL MEDICINE

## 2020-12-22 PROCEDURE — 85027 COMPLETE CBC AUTOMATED: CPT

## 2020-12-22 RX ORDER — DIGOXIN 0.25 MG/ML
250 INJECTION INTRAMUSCULAR; INTRAVENOUS ONCE
Status: COMPLETED | OUTPATIENT
Start: 2020-12-22 | End: 2020-12-22

## 2020-12-22 RX ORDER — LANOLIN ALCOHOL/MO/W.PET/CERES
100 CREAM (GRAM) TOPICAL DAILY
Status: DISCONTINUED | OUTPATIENT
Start: 2020-12-22 | End: 2020-12-30 | Stop reason: HOSPADM

## 2020-12-22 RX ORDER — SODIUM CHLORIDE 0.9 % (FLUSH) 0.9 %
10 SYRINGE (ML) INJECTION EVERY 12 HOURS SCHEDULED
Status: DISCONTINUED | OUTPATIENT
Start: 2020-12-22 | End: 2020-12-22

## 2020-12-22 RX ORDER — MULTIVITAMIN WITH IRON
1 TABLET ORAL DAILY
Status: DISCONTINUED | OUTPATIENT
Start: 2020-12-22 | End: 2020-12-30 | Stop reason: HOSPADM

## 2020-12-22 RX ORDER — FUROSEMIDE 10 MG/ML
60 INJECTION INTRAMUSCULAR; INTRAVENOUS 3 TIMES DAILY
Status: DISCONTINUED | OUTPATIENT
Start: 2020-12-22 | End: 2020-12-27

## 2020-12-22 RX ORDER — DIGOXIN 125 MCG
125 TABLET ORAL DAILY
Status: DISCONTINUED | OUTPATIENT
Start: 2020-12-23 | End: 2020-12-24

## 2020-12-22 RX ORDER — FUROSEMIDE 10 MG/ML
40 INJECTION INTRAMUSCULAR; INTRAVENOUS 3 TIMES DAILY
Status: DISCONTINUED | OUTPATIENT
Start: 2020-12-22 | End: 2020-12-22

## 2020-12-22 RX ORDER — SODIUM CHLORIDE 0.9 % (FLUSH) 0.9 %
10 SYRINGE (ML) INJECTION PRN
Status: DISCONTINUED | OUTPATIENT
Start: 2020-12-22 | End: 2020-12-27

## 2020-12-22 RX ADMIN — Medication 100 MG: at 21:02

## 2020-12-22 RX ADMIN — METOPROLOL TARTRATE 25 MG: 50 TABLET, FILM COATED ORAL at 21:02

## 2020-12-22 RX ADMIN — LEVOTHYROXINE SODIUM 50 MCG: 50 TABLET ORAL at 08:52

## 2020-12-22 RX ADMIN — HEPARIN SODIUM 2000 UNITS: 1000 INJECTION INTRAVENOUS; SUBCUTANEOUS at 12:02

## 2020-12-22 RX ADMIN — THERA TABS 1 TABLET: TAB at 21:02

## 2020-12-22 RX ADMIN — HEPARIN SODIUM 2000 UNITS: 1000 INJECTION INTRAVENOUS; SUBCUTANEOUS at 06:42

## 2020-12-22 RX ADMIN — DOCUSATE SODIUM 100 MG: 100 CAPSULE, LIQUID FILLED ORAL at 21:03

## 2020-12-22 RX ADMIN — HEPARIN SODIUM 5.88 UNITS/KG/HR: 10000 INJECTION, SOLUTION INTRAVENOUS at 06:42

## 2020-12-22 RX ADMIN — HEPARIN SODIUM 2000 UNITS: 1000 INJECTION INTRAVENOUS; SUBCUTANEOUS at 18:34

## 2020-12-22 RX ADMIN — HEPARIN SODIUM 7.88 UNITS/KG/HR: 10000 INJECTION, SOLUTION INTRAVENOUS at 18:34

## 2020-12-22 RX ADMIN — SODIUM CHLORIDE, PRESERVATIVE FREE 10 ML: 5 INJECTION INTRAVENOUS at 00:08

## 2020-12-22 RX ADMIN — FUROSEMIDE 40 MG: 10 INJECTION, SOLUTION INTRAMUSCULAR; INTRAVENOUS at 08:52

## 2020-12-22 RX ADMIN — DOCUSATE SODIUM 100 MG: 100 CAPSULE, LIQUID FILLED ORAL at 08:52

## 2020-12-22 RX ADMIN — METOPROLOL TARTRATE 25 MG: 50 TABLET, FILM COATED ORAL at 08:52

## 2020-12-22 RX ADMIN — SODIUM CHLORIDE, PRESERVATIVE FREE 10 ML: 5 INJECTION INTRAVENOUS at 09:24

## 2020-12-22 RX ADMIN — FUROSEMIDE 60 MG: 10 INJECTION, SOLUTION INTRAMUSCULAR; INTRAVENOUS at 21:02

## 2020-12-22 RX ADMIN — DIGOXIN 250 MCG: 0.25 INJECTION INTRAMUSCULAR; INTRAVENOUS at 11:46

## 2020-12-22 RX ADMIN — DILTIAZEM HYDROCHLORIDE 15 MG/HR: 5 INJECTION INTRAVENOUS at 02:35

## 2020-12-22 RX ADMIN — FUROSEMIDE 40 MG: 10 INJECTION, SOLUTION INTRAMUSCULAR; INTRAVENOUS at 14:34

## 2020-12-22 RX ADMIN — DILTIAZEM HYDROCHLORIDE 15 MG/HR: 5 INJECTION INTRAVENOUS at 11:15

## 2020-12-22 NOTE — ED NOTES
Report from [facility]. All questions answered at this time. Pt resting on stretcher, no respiratory distress noted, pt updated on plan of care, will continue to monitor, call light in reach.         Stella Camacho RN  12/21/20 1925

## 2020-12-22 NOTE — ED NOTES
Pt is resting on stretcher. Pt is in no cardiac distress with no rhythm changes. A&o x4.  Pt asked and recieved for a new urinal.      Bill Brooks RN  12/22/20 8386

## 2020-12-22 NOTE — ED NOTES
Pt resting on stretcher, no respiratory distress noted, will continue to monitor, call light in reach.        Tejas Palomino RN  12/22/20 4575

## 2020-12-22 NOTE — H&P
[facility]  Office: [phone]  Jeff Garcia DO, Sushila Arriola DO, Karl Bowden DO, Kylee Clark, DO, Serena Cueva MD, Brian Olmedo MD, Maia Reese MD, Michelle Grove MD, Tanesha Deleon MD, Lilly Walker MD, Cruz Bazan MD, Erik Maddox MD, Troy Anderson MD, Alvino Mayer DO, Melva Hawkins MD, Flavio Moe MD, Ruthie Yeh DO, Theodore Boateng MD,  Anay Smith DO, Mesha Newell MD, Karla Mauricio MD, Alfredo [facility], [facility], CNP, Ramila Bell, CNP, Yovanny Crump, CNS, Gay Navarro, CNP, Reed Chase, CNP, Maik Tsai, CNP, Stevie Johnson, CNP, Ean Alicia, CNP, Brenda Mcmullen PA-C, Samm Monson, [facility], Anabel Glez, CNP, Kirill Garrison, CNP, Martha Ulrich, CNP, Isabella Crespo, CNP, Ml Bledsoe, CNP         [facility]   [address] [facility]    Progress Note    12/22/2020    9:44 AM    Name:   Akash Murray  MRN:     [id number]     Acct:      [de-identified]   Room:   16/16   Day:  1  Admit Date:  12/21/2020  2:39 PM    PCP:   No primary care provider on file. Code Status:  Full Code    Subjective:     C/C:   Chief Complaint   Patient presents with    Chest Pain     new onset a-fib with rvr     Interval History Status: not changed. Pt seen and evaluated this morning. He continues to complain of generalized swelling and pain in his legs. Remains short of breath. Only urinated 300 mL, diuretics were increased per cardiology. Afebrile and hemodynamically stable. Remains in a-fib. Rate controlled on cardizem drip. Brief History:     Akash Murray is a 54 y.o. Unavailable/unknown male who presents with Chest Pain (new onset a-fib with rvr)   and is admitted to the hospital for the management of New onset of congestive heart failure [facility]).     Mr. Jose Magaña is a 55 yo male who called EMS today with c/o  shortness of breath x 1 week with worsening symptoms today.   EMS noted patient with heart rate 190, Cardizem 20 mg IV push given. Upon arrival to ED heart rate was noted A. fib in the 130s. Patient also complains of bilateral lower extremity edema for the past several weeks that it is extended through his hips including his scrotum and penis and subsequent penile pain. He endorses a 60 pound weight gain over the past few months. He believes the symptoms have significantly increased over the last 1 month starting with severe constipation and decreased urinary output. He denies pain, cough, nausea or vomiting, fever or chills.     He has no established PCP, denies medication use. ED course of treatment:      Thyroxine, Free0.96   TSH12.62      MOM23qe/dL   CREATININE1.22      Pro-BNP6,731      Troponin, High Sensitivity 81High Panic     Admitted to medicine. Cardiology consulted. IV diuresis initiated. Echocardiogram pending. Review of Systems:     Constitutional:  negative for chills, fevers, sweats  Respiratory:  negative for cough, wheezing. + shortness of breath, dyspnea on exertion  Cardiovascular:  negative for chest pain, chest pressure/discomfort, palpitations. + generalized edema. Gastrointestinal:  negative for abdominal pain, constipation, diarrhea, nausea, vomiting. + abdominal distension   Neurological:  negative for dizziness, headache    Medications: Allergies:     Allergies   Allergen Reactions    Seasonal        Current Meds:   Scheduled Meds:    docusate sodium  100 mg Oral BID    sodium chloride flush  10 mL Intravenous 2 times per day    [Held by provider] lisinopril  5 mg Oral Daily    metoprolol tartrate  25 mg Oral BID    furosemide  40 mg Intravenous BID    levothyroxine  50 mcg Oral Daily     Continuous Infusions:    dilTIAZem (CARDIZEM) 125 mg in dextrose 5% 125 mL infusion 15 mg/hr (12/22/20 0235)    heparin (PORCINE) Infusion 5.88 Units/kg/hr (12/22/20 0642)     PRN Meds: heparin (porcine), heparin (porcine), sodium chloride flush, promethazine **OR** ondansetron, polyethylene glycol, acetaminophen **OR** acetaminophen    Data:     Past Medical History:   has no past medical history on file. Social History:   reports that he has never smoked. His smokeless tobacco use includes chew. He reports current alcohol use of about 30.0 standard drinks of alcohol per week. He reports that he does not use drugs. Family History:   Family History   Problem Relation Age of Onset    Atrial Fibrillation Mother     Stroke Father        Vitals:  BP (!) 150/97   Pulse 130   Temp 97.7 °F (36.5 °C) (Oral)   Resp 23   Ht 5' 11\" (1.803 m)   Wt (!) 320 lb (145.2 kg)   SpO2 96%   BMI 44.63 kg/m²   Temp (24hrs), Av.7 °F (36.5 °C), Min:97.7 °F (36.5 °C), Max:97.7 °F (36.5 °C)    No results for input(s): POCGLU in the last 72 hours. I/O (24Hr): Intake/Output Summary (Last 24 hours) at 2020 0944  Last data filed at 2020 1814  Gross per 24 hour   Intake --   Output 300 ml   Net -300 ml       Labs:  Hematology:  Recent Labs     20  1512 20  0609   WBC 6.5 5.7   RBC 4.58 4.22   HGB 14.4 13.4   HCT 47.1 43.8   .8 103.8*   MCH 31.4 31.8   MCHC 30.6 30.6   RDW 12.6 12.7    152   MPV 12.0 11.7   INR 1.3  --      Chemistry:  Recent Labs     20  1512 20  1512 20  1913 20  2315 20  0609     --   --   --  135   K 5.4*  --   --   --  4.2     --   --   --  101   CO2 20  --   --   --  20   GLUCOSE 122*  --   --   --  115*   BUN 16  --   --   --  17   CREATININE 1.22*  --   --   --  1.23*   MG  --   --   --   --  1.9   ANIONGAP 15  --   --   --  14   LABGLOM >60  --   --   --  >60   GFRAA >60  --   --   --  >60   CALCIUM 9.3  --   --   --  9.2   PROBNP 6,731*  --   --   --   --    TROPHS 81*   < > 77* 83* 87*    < > = values in this interval not displayed.      Recent Labs     20  1512 20  0609   PROT 6.6 6.8   LABALBU 3.6 3.5   TSH 12.62*  --    AST 24 27   ALT 18 18   ALKPHOS 119 108   BILITOT 2.07* 1.78*   BILIDIR --  0.64*   CHOL  --  126   HDL  --  39*   LDLCHOLESTEROL  --  69   CHOLHDLRATIO  --  3.2   TRIG  --  88   VLDL  --  NOT REPORTED     ABG:No results found for: POCPH, PHART, PH, POCPCO2, IRR6HCM, PCO2, POCPO2, PO2ART, PO2, POCHCO3, AXO5BTB, HCO3, NBEA, PBEA, BEART, BE, THGBART, THB, DYU9PWK, MNDU3GGE, I5DLRSNJ, O2SAT, FIO2  No results found for: SPECIAL  No results found for: CULTURE    Radiology:  Xr Chest (2 Vw)    Result Date: 12/22/2020  Right pleural effusion. Question of whether some of this is loculated. Question of whether there could be a central obstructing process as there is prominence of the right perihilar structures without definite pulmonary congestion. Large body habitus. Xr Abdomen (kub) (single Ap View)    Result Date: 12/21/2020  Nonspecific nonobstructive bowel gas pattern. Xr Chest Portable    Result Date: 12/21/2020  Cardiomegaly and pulmonary vascular congestion. Moderate to large right pleural effusion and right lower lobe airspace disease, atelectasis versus pneumonia. Physical Examination:        General appearance:  alert, cooperative and no distress  Mental Status:  oriented to person, place and time and normal affect  Lungs: There is diminished breath sounds right lower lung field. Otherwise clear to auscultation bilaterally  Heart:  Tachycardia, irregularly irregular rhythm  Abdomen:  soft, nontender, Abdomen is quite firm and distended. Non-tender  Extremities:  Bilateral extensive pitting edema extending to the proximal thighs and scrotum.  No tenderness in the calves  Skin:  no gross lesions, rashes, induration    Assessment:        Hospital Problems           Last Modified POA    * (Principal) New onset of congestive heart failure ([address] 12/21/2020 Yes    New onset a-fib ([address] 12/21/2020 Yes    Other specified hypothyroidism 12/21/2020 Yes    Elevated troponin 12/21/2020 Yes    Elevated serum creatinine 12/21/2020 Yes    Class 3 severe obesity due to excess calories with body mass index (BMI) of 40.0 to 44.9 in adult [facility]) 12/21/2020 Yes    Edema 12/21/2020 Yes    Constipation 12/21/2020 Yes    Anasarca 12/21/2020 Yes    TSH elevation 12/21/2020 Yes    Hyperkalemia 12/21/2020 Yes    Atrial fibrillation, rapid ([address] 12/21/2020 Yes          Plan:        #Atrial fibrillation with RVR  - rate control with cardizem  - cardiology following  - obtain echo  - on heparin, transition to NOAC at discretion of cardiology    #Elevated troponin  - likely secondary to a-fib RVR, acute CHF  - on heparin  - further work-up per cardiology    #Tachyarrhythmia induced acute CHF  - IV diuresis  - rate control  - obtain echo  - strict I/O    #Right pleural effusion, ?  Loculation  - will need to eventually obtain CT to r/o obstructing process based on x-ray findings  - likely secondary to CHF    #Hypothyroidism  - start levothyroxine    #Acute kidney injury  - suspect likely cardiorenal syndrome  - continue to monitor creatinine  - obtain PATSY to r/o obstructive process  - obtain ADONAY Louis PA-C  12/22/2020  9:44 AM

## 2020-12-22 NOTE — ED NOTES
Pt is resting on stretcher with no respiratory distress and no rhythm change. Pt is a&o x4. Pt stretcher pad was repositined for comfort.       Manjeet Howard RN  12/22/20 9562

## 2020-12-22 NOTE — ED NOTES
Pt resting on stretcher, no respiratory distress noted, pt updated on plan of care, will continue to monitor, call light in reach.        Markus Eden RN  12/21/20 0744

## 2020-12-22 NOTE — ED NOTES
Pt resting on stretcher, no respiratory distress noted, pt updated on plan of care, will continue to monitor, call light in reach.        Gokul Martinez RN  12/21/20 7415

## 2020-12-22 NOTE — ED NOTES
Pt resting on stretcher, no respiratory distress noted, pt updated on plan of care, will continue to monitor, call light in reach.        Tj Alas RN  12/22/20 1800

## 2020-12-22 NOTE — ED NOTES
Report received from [facility]; pt transported to ECHO from room 16, pt will be transported to room 35 after ECHO is completed     Nemesio Fu RN  12/22/20 9907

## 2020-12-22 NOTE — ED NOTES
Pt resting on stretcher, no respiratory distress noted, pt updated on plan of care, will continue to monitor, call light in reach.        Tomi Ghosh RN  12/22/20 2959

## 2020-12-22 NOTE — ED NOTES
Pt resting on stretcher, no respiratory distress noted, pt updated on plan of care, will continue to monitor, call light in reach.        Nazario Hamilton RN  12/22/20 7443

## 2020-12-22 NOTE — ED NOTES
ECHO called and reported cardiac output @ 29%. Provider perfect served & notified of results.      Leora Richter RN  12/22/20 2205

## 2020-12-22 NOTE — PROGRESS NOTES
[facility]  Occupational Therapy Not Seen Note    DATE: 2020  Name: Shweta Groves  : 1965  MRN: [id number]    Patient not available for Occupational Therapy due to:    [x] Testing: Pt off floor for ultrasound    [] Hemodialysis    [] Blood Transfusion in Progress    []Refusal by Patient:    [] Surgery/Procedure:    [] Strict Bedrest    [] Sedation    [] Spine Precautions     [] Pt being transferred to palliative care at this time. Spoke with pt/family and OT services to be defered. [] Pt independent with functional mobility and functional tasks.  Pt with no OT acute care needs at this time, will defer OT eval.    [] Other    Next Scheduled Treatment: check back     [address]

## 2020-12-22 NOTE — ED NOTES
Pt resting on stretcher, no respiratory distress or rhythm change. Pt is a&o x4.       Osman Humphries RN  12/22/20 0782

## 2020-12-22 NOTE — H&P
[facility]  Office: [address], DO, Janett Alert, DO, Blas Murray, DO, Samantharichi Stauffersten Blood, DO, Holley Meyer MD, Dorota Posadas MD, Vito Pina MD, Jasmeet Tay MD, Robinson Merida MD, Lian Whitaker MD, Tj Frankel MD, Jil Perera MD, Mbtremaine De Los Santos MD, Stacie Bell, DO, Philipp Frazier MD, Isrrael Glez MD, Ninoska Buckner DO, Lady Stephanie MD,  Nahed Jason DO, Nicholas Meyers MD, Norma Schneider MD, Analilia Boyd, [facility], [facility], CNP, Torri Capone, CNP, Kitty Randle, CNS, Amarjit Woods, CNP, Kwabena Cook, CNP, Heriebrto Doll, CNP, Yelena Sweeney, CNP, aRy Durant, CNP, Dionne Moreira PA-C, Rosio Guzman, [facility], Lion Paulino, CNP, Saray Dunn, CNP, Mirna Alarcon, CNP, Woo Mckeon, CNP, Carmelita Smith, [address]    HISTORY AND PHYSICAL EXAMINATION            Date:   12/21/2020  Patient name:  Vickey Brittle  Date of admission:  12/21/2020  2:39 PM  MRN:   [id number]  Account:  [de-identified]  YOB: 1965  PCP:    No primary care provider on file. Room:   16/16  Code Status:    Full Code    Chief Complaint:     Chief Complaint   Patient presents with    Chest Pain     new onset a-fib with rvr       History Obtained From:     patient, electronic medical record    History of Present Illness: The patient is a 54 y.o. male who is admitted to the hospital for the management of:  CHF    Patient was admitted thru ER with:  Dannie Sotomayor is a 54 y. o.yo male no significant past medical history no current medications who presents with acute onset of heart fluttering, patient called EMS due to feeling as if his heart was racing and shortness of breath. EMS arrived and patient was noted to be in A. fib with RVR. No history of A. Fib.   Patient states he has been having bilateral lower extremity swelling for the past few weeks and the past few days patient noted that his swelling has extended up his legs and into his scrotum and penis. Patient stating he is having pain in scrotum and penis although he is still making urine. Pressure type pain in scrotum. Denies chest pain at this time. Does have shortness of breath. 10/10 pain.      Patient does state that he has had about a 60 pound weight gain in the past few months, also has had significant constipation for the past few weeks as well. Does not have a primary care physician, does not follow with a physician, denies any home medications again. \"    The patient reports his problems began approximately 2 months ago  He developed what he thought was constipation  He was using a variety of laxatives and stool softeners  Reportedly was seen at an urgent care and was given a prescription for Colace  The patient experienced increasing edema and abdominal distention  There has been increasing dyspnea on exertion and exercise intolerance  He ultimately presented to the emergency room    Database has included:  Chest x-ray:  Impression:  Cardiomegaly and pulmonary vascular congestion. Moderate to large right pleural effusion and right lower lobe airspace   disease, atelectasis versus pneumonia. EKG:  Atrial fibrillation with rapid ventricular response with premature ventricular or aberrantly conducted complexes   Nonspecific T wave abnormality   Abnormal ECG   No previous ECGs available     Pro-BNP6,731High     Troponin, High Sensitivity 81High Panic   Troponin, High Sensitivity 75High Panic       Potassium5. 4High     [id number] mg/dL     UWJ19xx/dL   CREATININE1. 22High     Thyroxine, Free0.96   [id number]. 62High         Past Medical History:     History reviewed. No pertinent past medical history. Past Surgical History:     Past Surgical History:   Procedure Laterality Date    ELBOW SURGERY          Medications Prior to Admission:     Prior to Admission medications    Medication Sig Start Date End Date Taking?  Authorizing Provider   docusate sodium (COLACE) 100 MG capsule Take 100 mg by mouth 2 times daily    Historical Provider, MD        Allergies:     Seasonal    Social History:     Tobacco:    reports that he has never smoked. His smokeless tobacco use includes chew. Alcohol:      reports current alcohol use of about 30.0 standard drinks of alcohol per week. Drug Use:  reports no history of drug use. Family History:     Family History   Problem Relation Age of Onset    Atrial Fibrillation Mother     Stroke Father        Review of Systems:     Positive and Negative as described in HPI. Review of Systems   Constitutional: Positive for activity change (Diminished), fatigue and unexpected weight change (60 pound weight gain). Negative for appetite change, chills, diaphoresis and fever. HENT: Negative for congestion and nosebleeds. Eyes: Negative for photophobia and visual disturbance. Respiratory: Positive for shortness of breath (Dyspnea on exertion). Negative for cough and wheezing. Cardiovascular: Positive for leg swelling. Negative for chest pain and palpitations. Gastrointestinal: Positive for abdominal distention and constipation. Negative for blood in stool, nausea and vomiting. Genitourinary: Negative for flank pain and hematuria. Musculoskeletal: Negative for arthralgias and myalgias. Skin: Negative for rash and wound. Neurological: Positive for weakness (Easy fatigue). Negative for dizziness and light-headedness. Physical Exam:   BP (!) 142/111   Pulse 133   Temp 97.7 °F (36.5 °C) (Oral)   Resp 30   Ht 5' 11\" (1.803 m)   Wt (!) 320 lb (145.2 kg)   SpO2 91%   BMI 44.63 kg/m²   Temp (24hrs), Av.7 °F (36.5 °C), Min:97.7 °F (36.5 °C), Max:97.7 °F (36.5 °C)    No results for input(s): POCGLU in the last 72 hours.     Intake/Output Summary (Last 24 hours) at 2020  Last data filed at 2020 1814  Gross per 24 hour   Intake --   Output 300 ml   Net -300 ml       Physical Exam  Vitals signs reviewed. Constitutional:       General: He is not in acute distress. Appearance: He is not diaphoretic. HENT:      Head: Normocephalic. Nose: Nose normal.   Eyes:      General: No scleral icterus. Conjunctiva/sclera: Conjunctivae normal.   Neck:      Musculoskeletal: Neck supple. Trachea: No tracheal deviation. Cardiovascular:      Rate and Rhythm: Normal rate and regular rhythm. Pulmonary:      Effort: Pulmonary effort is normal. No respiratory distress. Breath sounds: Normal breath sounds. No wheezing or rales. Chest:      Chest wall: No tenderness. Abdominal:      General: Bowel sounds are normal. There is no distension. Palpations: Abdomen is soft. Tenderness: There is no abdominal tenderness. Musculoskeletal:         General: No tenderness. Right lower leg: Edema present. Left lower leg: Edema present. Comments: Patient is 3/4 edema up to his abdomen    Skin:     General: Skin is warm and dry. Findings: Rash (Stasis dermatitis and excoriations at the ankle) present. Neurological:      Mental Status: He is alert.          Investigations:      Laboratory Testing:  Recent Results (from the past 24 hour(s))   EKG 12 Lead    Collection Time: 12/21/20  2:41 PM   Result Value Ref Range    Ventricular Rate 126 BPM    Atrial Rate 117 BPM    QRS Duration 86 ms    Q-T Interval 322 ms    QTc Calculation (Bazett) 466 ms    R Axis 50 degrees    T Axis -142 degrees   CBC Auto Differential    Collection Time: 12/21/20  3:12 PM   Result Value Ref Range    WBC 6.5 3.5 - 11.3 k/uL    RBC 4.58 4.21 - 5.77 m/uL    Hemoglobin 14.4 13.0 - 17.0 g/dL    Hematocrit 47.1 40.7 - 50.3 %    .8 82.6 - 102.9 fL    MCH 31.4 25.2 - 33.5 pg    MCHC 30.6 28.4 - 34.8 g/dL    RDW 12.6 11.8 - 14.4 %    Platelets 013 579 - 422 k/uL    MPV 12.0 8.1 - 13.5 fL    NRBC Automated 0.0 0.0 per 100 WBC    Differential Type NOT REPORTED     WBC Morphology NOT REPORTED     RBC Morphology NOT REPORTED     Platelet Estimate NOT REPORTED     Immature Granulocytes 0 0 %    Seg Neutrophils 74 (H) 36 - 66 %    Lymphocytes 13 (L) 24 - 44 %    Monocytes 11 (H) 1 - 7 %    Eosinophils % 1 1 - 4 %    Basophils 1 0 - 2 %    Absolute Immature Granulocyte 0.00 0.00 - 0.30 k/uL    Segs Absolute 4.79 1.8 - 7.7 k/uL    Absolute Lymph # 0.85 (L) 1.0 - 4.8 k/uL    Absolute Mono # 0.72 0.1 - 0.8 k/uL    Absolute Eos # 0.07 0.0 - 0.4 k/uL    Basophils Absolute 0.07 0.0 - 0.2 k/uL    Morphology MACROCYTOSIS PRESENT     Morphology LARGE PLATELETS PRESENT    Comprehensive Metabolic Panel w/ Reflex to MG    Collection Time: 12/21/20  3:12 PM   Result Value Ref Range    Glucose 122 (H) 70 - 99 mg/dL    BUN 16 6 - 20 mg/dL    CREATININE 1.22 (H) 0.70 - 1.20 mg/dL    Bun/Cre Ratio NOT REPORTED 9 - 20    Calcium 9.3 8.6 - 10.4 mg/dL    Sodium 135 135 - 144 mmol/L    Potassium 5.4 (H) 3.7 - 5.3 mmol/L    Chloride 100 98 - 107 mmol/L    CO2 20 20 - 31 mmol/L    Anion Gap 15 9 - 17 mmol/L    Alkaline Phosphatase 119 40 - 129 U/L    ALT 18 5 - 41 U/L    AST 24 <40 U/L    Total Bilirubin 2.07 (H) 0.3 - 1.2 mg/dL    Total Protein 6.6 6.4 - 8.3 g/dL    Alb 3.6 3.5 - 5.2 g/dL    Albumin/Globulin Ratio 1.2 1.0 - 2.5    GFR Non-African American >60 >60 mL/min    GFR African American >60 >60 mL/min    GFR Comment          GFR Staging NOT REPORTED    Troponin    Collection Time: 12/21/20  3:12 PM   Result Value Ref Range    Troponin, High Sensitivity 81 (HH) 0 - 22 ng/L    Troponin T NOT REPORTED <0.03 ng/mL    Troponin Interp NOT REPORTED    Brain Natriuretic Peptide    Collection Time: 12/21/20  3:12 PM   Result Value Ref Range    Pro-BNP 6,731 (H) <300 pg/mL    BNP Interpretation Pro-BNP Reference Range:    TSH with Reflex    Collection Time: 12/21/20  3:12 PM   Result Value Ref Range    TSH 12.62 (H) 0.30 - 5.00 mIU/L   T4, Free    Collection Time: 12/21/20  3:12 PM   Result Value Ref Range    Thyroxine, Free 0.96 0.93 - 1.70 ng/dL   Protime-INR    Collection Time: 12/21/20  3:12 PM   Result Value Ref Range    Protime 13.3 (H) 9.0 - 12.0 sec    INR 1.3    APTT    Collection Time: 12/21/20  3:12 PM   Result Value Ref Range    PTT 23.4 20.5 - 30.5 sec   COVID-19    Collection Time: 12/21/20  4:02 PM    Specimen: Other   Result Value Ref Range    SARS-CoV-2          SARS-CoV-2, Rapid Not Detected Not Detected    Source . NASOPHARYNGEAL SWAB     SARS-CoV-2         Troponin    Collection Time: 12/21/20  4:30 PM   Result Value Ref Range    Troponin, High Sensitivity 75 (HH) 0 - 22 ng/L    Troponin T NOT REPORTED <0.03 ng/mL    Troponin Interp NOT REPORTED    Troponin    Collection Time: 12/21/20  7:13 PM   Result Value Ref Range    Troponin, High Sensitivity 77 (HH) 0 - 22 ng/L    Troponin T NOT REPORTED <0.03 ng/mL    Troponin Interp NOT REPORTED        Imaging/Diagnostics:    Xr Chest Portable    Result Date: 12/21/2020  Cardiomegaly and pulmonary vascular congestion. Moderate to large right pleural effusion and right lower lobe airspace disease, atelectasis versus pneumonia. Assessment :      Primary Problem  Principal Problem:    New onset of congestive heart failure (HCC)  Active Problems:    New onset a-fib ([address]    Other specified hypothyroidism    Elevated troponin    Elevated serum creatinine    Class 3 severe obesity due to excess calories with body mass index (BMI) of 40.0 to 44.9 in [facility])    Edema    Constipation    Anasarca    TSH elevation    Hyperkalemia    Atrial fibrillation, rapid ([address]  Resolved Problems:    * No resolved hospital problems.  *        Plan:     Admit  Lasix  Lisinopril  Heparin  Cardizem/rate control  Trend troponins  Monitor BUN/creatinine  Laxatives as needed  Initiate thyroid replacement  Kayexalate for hyperkalemia  Database in progress    Consultations:   [address] TO HOSPITALIST  IP CONSULT TO HEART FAILURE NURSE/COORDINATOR  IP CONSULT TO DIETITIAN     Patient is admitted as inpatient status because of co-morbidities listed above, severity of signs and symptoms as outlined, requirement for current medical therapies and most importantly because of direct risk to patient if care not provided in a hospital setting. Jonel Conklin DO  12/21/2020  8:48 PM    Copy sent to Dr. John Woodward primary care provider on file.

## 2020-12-22 NOTE — CONSULTS
was 12.62    EKG showed atrial fibrillation with RVR, non specific ST and T wave changes. Xray showed cardiomegaly with pulmonary vascular congestion, moderate to large right sided pleural effusion. Pt was given lasix 40 mg I.v x 2, started on cardizem gtt. he reports he has not had significant urine output since after getting diuretics since yesterday. Upon my evaluation, the patient continues to be in atrial fibrillation with RVR, /min, /80 mmHg. Past Medical History:     has no past medical history on file. Past Surgical History:     has a past surgical history that includes Elbow surgery. Home Medications:    Prior to Admission medications    Medication Sig Start Date End Date Taking? Authorizing Provider   docusate sodium (COLACE) 100 MG capsule Take 100 mg by mouth 2 times daily    Historical Provider, MD       Allergies:   Seasonal     Social History:     reports that he has never smoked. His smokeless tobacco use includes chew. He reports current alcohol use of about 30.0 standard drinks of alcohol per week. He reports that he does not use drugs. Family History:  family history includes Atrial Fibrillation in his mother; Stroke in his father. No h/o sudden cardiac death. No for premature CAD     REVIEW OF SYSTEMS:    General: Denies any fevers, chills, rigors. RePorts change in energy, physical activity, weight gain.   HEENT: No visual disturbances, hearing disturbances, nasal drainage or neck swelling  Heart: Denies any chest pain, palpitations, PND,   Lungs: Has SOB, DEE,  cough, no wheezing or pleurisy  Abdomen: Denies abdominal pain, nausea, vomiting, has constipation, no diarrhea or rectal  bleeding   Extremities: Has leg swelling no calf tenderness  Musculo skeletal: Denies any arthralgias, joint swelling  Skin: Denies any rash/skin changes  Hem/Onc: Denies bleeding gums, swollen lymph nodes  Endo: Denies polydypsia, polyphagia  Psychiatric: Denies any mood changes, agitation or psychosis    PHYSICAL EXAM:    Physical Examination:    Temperature:  Temp: 97.7 °F (36.5 °C)  Respirations:  Resp: 23  Pulse:   Pulse: 130  BP:    BP: (!) 150/97    Constitutional and General Appearance: alert, cooperative, generalized anasarca +  HEENT: PERRL, no cervical lymphadenopathy. No masses palpable. Normal oral mucosa, JVD. Respiratory:  · Normal excursion and expansion without use of accessory muscles  Resp Auscultation: Good respiratory effort. No for increased work of breathing. On auscultation: Diffuse bilateral crackles on lung auscultation   cardiovascular:  · The apical impulse is not displaced  · Heart auscultation: Irregular, S1, S2 heard, no murmur, rubs or gallops. · Jugular venous pulsation Normal  · The carotid upstroke is normal in amplitude and contour without delay or bruit  · Peripheral pulses are symmetrical and full   Abdomen:  · No masses or tenderness  · Bowel sounds present, abdominal swelling noted S/O anasarca  Extremities:  ·  No Cyanosis or Clubbing  ·  Lower extremity edema: Significant 3+ bilateral pitting pedal edema  ·  Skin: Warm and dry  Neurological:  · Alert and oriented. · Moves all extremities well  · No abnormalities of mood, affect, memory, mentation, or behavior are noted    DATA:    Labs:   CBC:   Recent Labs     12/21/20  1512 12/22/20  0609   WBC 6.5 5.7   HGB 14.4 13.4   HCT 47.1 43.8    152     BMP:   Recent Labs     12/21/20  1512 12/22/20  0609    135   K 5.4* 4.2   CO2 20 20   BUN 16 17   CREATININE 1.22* 1.23*   LABGLOM >60 >60   GLUCOSE 122* 115*     BNP: No results for input(s): BNP in the last 72 hours. PT/INR:   Recent Labs     12/21/20  1512   PROTIME 13.3*   INR 1.3     APTT:  Recent Labs     12/21/20  2315 12/22/20  0349   APTT 88.0* 31.6*     CARDIAC ENZYMES:No results for input(s): CKTOTAL, CKMB, CKMBINDEX, TROPONINI in the last 72 hours.   FASTING LIPID PANEL:  Lab Results   Component Value Date    HDL 39 12/22/2020 TRIG 88 12/22/2020     LIVER PROFILE:  Recent Labs     12/21/20  1512 12/22/20  0609   AST 24 27   ALT 18 18   LABALBU 3.6 3.5       Imaging  Xr Abdomen (kub) (single Ap View)    Result Date: 12/21/2020  EXAMINATION: ONE SUPINE XRAY VIEW(S) OF THE ABDOMEN 12/21/2020 9:13 pm COMPARISON: None. HISTORY: ORDERING SYSTEM PROVIDED HISTORY: Distension TECHNOLOGIST PROVIDED HISTORY: Distension Reason for Exam: supine Acuity: Unknown Type of Exam: Unknown FINDINGS: Nonspecific nonobstructive bowel gas pattern. No findings suggest bowel obstruction. Retained stool rectosigmoid junction. Mild degenerate changes. No radiopaque calcifications identified. Nonspecific nonobstructive bowel gas pattern. Xr Chest Portable    Result Date: 12/21/2020  EXAMINATION: ONE XRAY VIEW OF THE CHEST 12/21/2020 3:15 pm COMPARISON: None. HISTORY: ORDERING SYSTEM PROVIDED HISTORY: shortness of breath TECHNOLOGIST PROVIDED HISTORY: shortness of breath FINDINGS: Single portable frontal view of the chest is submitted for review. The cardiac silhouette is enlarged. There is a moderate right-sided pleural effusion and right lower lobe airspace disease. Mild central vascular congestion. No evidence for pneumothorax. Trachea is midline. Osseous structures and soft tissues are grossly intact. Cardiomegaly and pulmonary vascular congestion. Moderate to large right pleural effusion and right lower lobe airspace disease, atelectasis versus pneumonia. Last EKG: Atrial fibrillation with RVR, non specific ST and T wave changes.      Last Echo: None    Last Stress test/ LHC: No previous ischemia workup in the past    IMPRESSION:    Principal Problem:    New onset of congestive heart failure (HCC)  Active Problems:    New onset a-fib ([address]    Other specified hypothyroidism    Elevated troponin    Elevated serum creatinine    Class 3 severe obesity due to excess calories with body mass index (BMI) of 40.0 to 44.9 in adult [facility])    Edema Constipation    Anasarca    TSH elevation    Hyperkalemia    Atrial fibrillation, rapid ([address]  Resolved Problems:    * No resolved hospital problems. *      1. Generalized anasarca   2. New onset atrial fibrillation with RVR CHADSVASC 0  3. Pulmonary edema with right sided pleural effusion  4. New onset congestive heart failure - 2D ECHO pending  5. Elevated troponin - likely demand ischemia  6. Newly diagnosed Hypothyroidism  7. Acute kidney injury - likely secondary to cardiorenal type I  8. Hyperkalemia  9. Morbid obesity - BMI 44.6    RECOMMENDATIONS:  1. Continue cardizem gtt for rate control and heparin gtt for anticoagulation. Continue I.v. Lopressor 5 mg PRN for HR > 110/min. 2. Will discuss with Dr. Margarita Dumont regarding NEELIMA with cardioversion as the patient continues to be in atrial fibrillation with RVR. 3. Received lasix 80 mg I.v. total yesterday with minimal diuresis, continue aggressive diuresis lasix 60 mg I.v. BID. 4. Follow up 2D ECHO. 5. Continue metoprolol 25 mg BID, hold lisinopril for now due to TOMMIE and hyperkalemia. 6. Continue aspirin and lipitor. 7. Continue to trend troponin x 1.   8. May need thyroid replacement    Nancy Polanco MD      Department of Internal Medicine  [facility]         12/22/2020, 7:05 AM     I performed a history and physical examination of the patient and discussed management with the resident. I reviewed the residents note and agree with the documented findings and plan of care. Any areas of disagreement are noted on the chart. I was personally present for the key portions of any procedures. I have documented in the chart those procedures where I was not present during the key portions. I have personally evaluated this patient and have completed at least one if not all key elements of the E/M (history, physical exam, and MDM). Additional findings are as noted. 60 lbs wt gain with anasarca, orthopnea.  Acute CHF systolic vs diastolic, right pleural effusion, cardiomegaly on Xray. New onset Afib, TOMMIE vs CKD, Cr 1.2. Fluid restriction 1.2 L, Increase IV lasix 40mg TID. Peder Holden Monitor and replace electrolytes. TSH over 12 with normal Ft4 indicating subclinical hypthyroidism probably will need replacement. Flat HS trop elevation likely demand ischemia. Need evaluation for sleep apnea. Obtain TTE. K now is normal. IV digoxin 0.25mg x1 than 0.125mg po qday. Rate control with BB and CCB for now. On IV heparin. Will diurese first followed by consideration of NEELIMA/CV. May need cardiac cath if there is significant cardiomyopathy. Consider IR guided thoracocentesis.     Lorelei Aguirre MD

## 2020-12-22 NOTE — ED NOTES
Pt resting on stretcher, no respiratory distress noted, pt updated on plan of care, will continue to monitor, call light in reach.        Markus Eden RN  12/22/20 [id number]

## 2020-12-22 NOTE — ED NOTES
Pt resting on stretcher, no respiratory distress noted, pt updated on plan of care, will continue to monitor, call light in reach.        Tomi Ghosh RN  12/22/20 1547

## 2020-12-22 NOTE — ED NOTES
Pt resting on stretcher, no respiratory distress noted, pt updated on plan of care, will continue to monitor, call light in reach.        Tj Alas RN  12/21/20 [id number]

## 2020-12-23 LAB
ABSOLUTE EOS #: 0.25 K/UL (ref 0–0.44)
ABSOLUTE IMMATURE GRANULOCYTE: 0 K/UL (ref 0–0.3)
ABSOLUTE LYMPH #: 0.49 K/UL (ref 1.1–3.7)
ABSOLUTE MONO #: 0.64 K/UL (ref 0.1–1.2)
ALBUMIN SERPL-MCNC: 3.2 G/DL (ref 3.5–5.2)
ANION GAP SERPL CALCULATED.3IONS-SCNC: 14 MMOL/L (ref 9–17)
BASOPHILS # BLD: 1 % (ref 0–2)
BASOPHILS ABSOLUTE: 0.05 K/UL (ref 0–0.2)
BUN BLDV-MCNC: 17 MG/DL (ref 6–20)
BUN/CREAT BLD: ABNORMAL (ref 9–20)
CALCIUM SERPL-MCNC: 9 MG/DL (ref 8.6–10.4)
CHLORIDE BLD-SCNC: 102 MMOL/L (ref 98–107)
CO2: 21 MMOL/L (ref 20–31)
CREAT SERPL-MCNC: 1.2 MG/DL (ref 0.7–1.2)
DIFFERENTIAL TYPE: ABNORMAL
EOSINOPHILS RELATIVE PERCENT: 5 % (ref 1–4)
GFR AFRICAN AMERICAN: >60 ML/MIN
GFR NON-AFRICAN AMERICAN: >60 ML/MIN
GFR SERPL CREATININE-BSD FRML MDRD: ABNORMAL ML/MIN/{1.73_M2}
GFR SERPL CREATININE-BSD FRML MDRD: ABNORMAL ML/MIN/{1.73_M2}
GLUCOSE BLD-MCNC: 111 MG/DL (ref 70–99)
HCT VFR BLD CALC: 43.4 % (ref 40.7–50.3)
HEMOGLOBIN: 13.3 G/DL (ref 13–17)
IMMATURE GRANULOCYTES: 0 %
INR BLD: 1.3
LACTATE DEHYDROGENASE: 208 U/L (ref 135–225)
LYMPHOCYTES # BLD: 10 % (ref 24–43)
MAGNESIUM: 2 MG/DL (ref 1.6–2.6)
MCH RBC QN AUTO: 31.2 PG (ref 25.2–33.5)
MCHC RBC AUTO-ENTMCNC: 30.6 G/DL (ref 28.4–34.8)
MCV RBC AUTO: 101.9 FL (ref 82.6–102.9)
MONOCYTES # BLD: 13 % (ref 3–12)
MORPHOLOGY: NORMAL
NRBC AUTOMATED: 0 PER 100 WBC
PARTIAL THROMBOPLASTIN TIME: 40.1 SEC (ref 20.5–30.5)
PARTIAL THROMBOPLASTIN TIME: 44.9 SEC (ref 20.5–30.5)
PARTIAL THROMBOPLASTIN TIME: 56 SEC (ref 20.5–30.5)
PARTIAL THROMBOPLASTIN TIME: >120 SEC (ref 20.5–30.5)
PDW BLD-RTO: 12.7 % (ref 11.8–14.4)
PHOSPHORUS: 4.5 MG/DL (ref 2.5–4.5)
PLATELET # BLD: ABNORMAL K/UL (ref 138–453)
PLATELET ESTIMATE: ABNORMAL
PLATELET, FLUORESCENCE: 132 K/UL (ref 138–453)
PLATELET, IMMATURE FRACTION: 6.3 % (ref 1.1–10.3)
PMV BLD AUTO: ABNORMAL FL (ref 8.1–13.5)
POTASSIUM SERPL-SCNC: 3.8 MMOL/L (ref 3.7–5.3)
PROTHROMBIN TIME: 13.5 SEC (ref 9–12)
RBC # BLD: 4.26 M/UL (ref 4.21–5.77)
RBC # BLD: ABNORMAL 10*6/UL
SEG NEUTROPHILS: 71 % (ref 36–65)
SEGMENTED NEUTROPHILS ABSOLUTE COUNT: 3.47 K/UL (ref 1.5–8.1)
SODIUM BLD-SCNC: 137 MMOL/L (ref 135–144)
TOTAL PROTEIN: 6.3 G/DL (ref 6.4–8.3)
TROPONIN INTERP: ABNORMAL
TROPONIN T: ABNORMAL NG/ML
TROPONIN, HIGH SENSITIVITY: 75 NG/L (ref 0–22)
TROPONIN, HIGH SENSITIVITY: 75 NG/L (ref 0–22)
TROPONIN, HIGH SENSITIVITY: 79 NG/L (ref 0–22)
TROPONIN, HIGH SENSITIVITY: 85 NG/L (ref 0–22)
WBC # BLD: 4.9 K/UL (ref 3.5–11.3)
WBC # BLD: ABNORMAL 10*3/UL

## 2020-12-23 PROCEDURE — 85025 COMPLETE CBC W/AUTO DIFF WBC: CPT

## 2020-12-23 PROCEDURE — 6370000000 HC RX 637 (ALT 250 FOR IP): Performed by: NURSE PRACTITIONER

## 2020-12-23 PROCEDURE — 6370000000 HC RX 637 (ALT 250 FOR IP): Performed by: INTERNAL MEDICINE

## 2020-12-23 PROCEDURE — 2060000000 HC ICU INTERMEDIATE R&B

## 2020-12-23 PROCEDURE — 80069 RENAL FUNCTION PANEL: CPT

## 2020-12-23 PROCEDURE — 6360000002 HC RX W HCPCS: Performed by: NURSE PRACTITIONER

## 2020-12-23 PROCEDURE — 84484 ASSAY OF TROPONIN QUANT: CPT

## 2020-12-23 PROCEDURE — 84155 ASSAY OF PROTEIN SERUM: CPT

## 2020-12-23 PROCEDURE — 85730 THROMBOPLASTIN TIME PARTIAL: CPT

## 2020-12-23 PROCEDURE — 99233 SBSQ HOSP IP/OBS HIGH 50: CPT | Performed by: INTERNAL MEDICINE

## 2020-12-23 PROCEDURE — 85055 RETICULATED PLATELET ASSAY: CPT

## 2020-12-23 PROCEDURE — 85610 PROTHROMBIN TIME: CPT

## 2020-12-23 PROCEDURE — 83615 LACTATE (LD) (LDH) ENZYME: CPT

## 2020-12-23 PROCEDURE — 6360000002 HC RX W HCPCS: Performed by: INTERNAL MEDICINE

## 2020-12-23 PROCEDURE — 83735 ASSAY OF MAGNESIUM: CPT

## 2020-12-23 RX ORDER — METOPROLOL TARTRATE 50 MG/1
25 TABLET, FILM COATED ORAL ONCE
Status: COMPLETED | OUTPATIENT
Start: 2020-12-23 | End: 2020-12-23

## 2020-12-23 RX ORDER — DIGOXIN 0.25 MG/ML
250 INJECTION INTRAMUSCULAR; INTRAVENOUS ONCE
Status: COMPLETED | OUTPATIENT
Start: 2020-12-23 | End: 2020-12-23

## 2020-12-23 RX ORDER — METOPROLOL TARTRATE 50 MG/1
50 TABLET, FILM COATED ORAL 2 TIMES DAILY
Status: DISCONTINUED | OUTPATIENT
Start: 2020-12-23 | End: 2020-12-24

## 2020-12-23 RX ADMIN — Medication 100 MG: at 08:37

## 2020-12-23 RX ADMIN — METOPROLOL TARTRATE 50 MG: 50 TABLET, FILM COATED ORAL at 22:07

## 2020-12-23 RX ADMIN — LEVOTHYROXINE SODIUM 50 MCG: 50 TABLET ORAL at 06:31

## 2020-12-23 RX ADMIN — BISACODYL 5 MG: 5 TABLET, COATED ORAL at 20:50

## 2020-12-23 RX ADMIN — FUROSEMIDE 60 MG: 10 INJECTION, SOLUTION INTRAMUSCULAR; INTRAVENOUS at 14:30

## 2020-12-23 RX ADMIN — THERA TABS 1 TABLET: TAB at 08:37

## 2020-12-23 RX ADMIN — DIGOXIN 125 MCG: 125 TABLET ORAL at 08:35

## 2020-12-23 RX ADMIN — DOCUSATE SODIUM 100 MG: 100 CAPSULE, LIQUID FILLED ORAL at 08:36

## 2020-12-23 RX ADMIN — HEPARIN SODIUM 7.88 UNITS/KG/HR: 10000 INJECTION, SOLUTION INTRAVENOUS at 01:28

## 2020-12-23 RX ADMIN — HEPARIN SODIUM 2000 UNITS: 1000 INJECTION INTRAVENOUS; SUBCUTANEOUS at 07:10

## 2020-12-23 RX ADMIN — HEPARIN SODIUM 2000 UNITS: 1000 INJECTION INTRAVENOUS; SUBCUTANEOUS at 02:23

## 2020-12-23 RX ADMIN — FUROSEMIDE 60 MG: 10 INJECTION, SOLUTION INTRAMUSCULAR; INTRAVENOUS at 08:35

## 2020-12-23 RX ADMIN — DIGOXIN 250 MCG: 0.25 INJECTION INTRAMUSCULAR; INTRAVENOUS at 11:21

## 2020-12-23 RX ADMIN — FUROSEMIDE 60 MG: 10 INJECTION, SOLUTION INTRAMUSCULAR; INTRAVENOUS at 20:50

## 2020-12-23 RX ADMIN — METOPROLOL TARTRATE 25 MG: 50 TABLET, FILM COATED ORAL at 08:34

## 2020-12-23 RX ADMIN — DOCUSATE SODIUM 100 MG: 100 CAPSULE, LIQUID FILLED ORAL at 22:07

## 2020-12-23 RX ADMIN — METOPROLOL TARTRATE 25 MG: 50 TABLET, FILM COATED ORAL at 11:17

## 2020-12-23 NOTE — CARE COORDINATION
Social work consult is present. Writer contacted Dr Dennise Sherman who stated he did not enter the consult but patient will not be discharged for a few days.

## 2020-12-23 NOTE — ED NOTES
Report received from FLORA Marcos  Pt. Resting in stretcher comfortably, NAD, no needs expressed at this time. Will continue to monitor and reassess.        Maryanne Wong RN  12/23/20 3028

## 2020-12-23 NOTE — ED NOTES
Pt resting in bed, NAD noted rr even and non labored. Bed locked in lowest position, environment free of clutter. Call light within reach, will continue to monitor.        Radha Sal RN  12/23/20 1314

## 2020-12-23 NOTE — PROGRESS NOTES
[facility]   Progress Note                   Date:   12/23/2020  Patient name: Vickey Brittle  Date of admission:  12/21/2020  2:39 PM  MRN:   [id number]  YOB: 1965  PCP: No primary care provider on file. Reason for Admission: New onset of congestive heart failure ([facility] [address] [I50.9]    Subjective:       Clinical Changes / Abnormalities: Pt seen and examined in the room. Pt denies any CP but states he remains sob and edematous. Remains Afib with RVR, rate 100-110's. Medications:   Scheduled Meds:   digoxin  125 mcg Oral Daily    furosemide  60 mg Intravenous TID    thiamine  100 mg Oral Daily    multivitamin  1 tablet Oral Daily    docusate sodium  100 mg Oral BID    [Held by provider] lisinopril  5 mg Oral Daily    metoprolol tartrate  25 mg Oral BID    levothyroxine  50 mcg Oral Daily     Continuous Infusions:   heparin (PORCINE) Infusion 11.88 Units/kg/hr (12/23/20 0702)     CBC:   Recent Labs     12/21/20  1512 12/22/20  0609 12/23/20  0626   WBC 6.5 5.7 4.9   HGB 14.4 13.4 13.3    152 See Reflexed IPF Result     BMP:    Recent Labs     12/21/20  1512 12/22/20  0609 12/23/20  0626    135 137   K 5.4* 4.2 3.8    101 102   CO2 20 20 21   BUN 16 17 17   CREATININE 1.22* 1.23* 1.20   GLUCOSE 122* 115* 111*     Hepatic:   Recent Labs     12/21/20  1512 12/22/20  0609   AST 24 27   ALT 18 18   BILITOT 2.07* 1.78*   ALKPHOS 119 108     Troponin:   Recent Labs     12/22/20  1753 12/23/20  0018 12/23/20  0626   TROPHS 79* 85* 75*     BNP: No results for input(s): BNP in the last 72 hours. Lipids:   Recent Labs     12/22/20  0609   CHOL 126   HDL 39*     INR:   Recent Labs     12/21/20  1512   INR 1.3     ECHO 12/22/20  Summary  Left ventricle is normal in size with Moderately reduced systolic function,  visually estimated LVEF 35-40%. Evidence of diastolic dysfunction. Left atrium is moderately dilated. Right atrial enlargement.   Aortic sclerosis with mild focal calcification. Mild mitral regurgitation. Mild tricuspid regurgitation. Estimated right ventricular systolic pressure  is 29 mmHg. Objective:   Vitals: BP (!) 144/110   Pulse 140   Temp 97.7 °F (36.5 °C) (Oral)   Resp 22   Ht 5' 11\" (1.803 m)   Wt (!) 320 lb (145.2 kg)   SpO2 97%   BMI 44.63 kg/m²   General appearance: alert and cooperative with exam  HEENT: Head: Normocephalic, no lesions, without obvious abnormality. Neck: no JVD, trachea midline, no adenopathy  Lungs: Diminished  to auscultation  Heart: irregular rate and rhythm, s1/s2 auscultated, no murmurs, afib with RVR   Abdomen: distended. Firm   Extremities: +2 bilateral  edema  Neurologic: not done        Assessment / Acute Cardiac Problems:   1. Generalized anasarca   2. New onset atrial fibrillation with RVR CHADSVASC 0  3. Pulmonary edema with right sided pleural effusion  4. New onset congestive heart failure - 2D ECHO pending  5. Elevated troponin - likely demand ischemia  6. Newly diagnosed Hypothyroidism  7. Acute kidney injury - likely secondary to cardiorenal type I  8. Hyperkalemia  9.  Morbid obesity - BMI 44.6    Patient Active Problem List:     New onset of congestive heart failure (HCC)     New onset a-fib ([address]     Other specified hypothyroidism     Elevated troponin     Elevated serum creatinine     Class 3 severe obesity due to excess calories with body mass index (BMI) of 40.0 to 44.9 in adult (HCC)     Edema     Constipation     Anasarca     TSH elevation     Hyperkalemia     Atrial fibrillation, rapid (HCC)     Atrial fibrillation (HCC)     Acute on chronic systolic congestive heart failure (HCC)    [id number] Score for Atrial Fibrillation Stroke Risk   Risk   Factors  Component Value   C CHF Yes 1   H HTN No 0   A2 Age >= 75 No,  (54 y.o.) 0   D DM No 0   S2 Prior Stroke/TIA No 0   V Vascular Disease No 0   A Age 74-69 No,  (54 y.o.) 0   Sc Sex male 0    [id number]  Score  1   Score last updated 12/23/20 10:56 AM EST    Click here for a link to the UpToDate guideline \"Atrial Fibrillation: Anticoagulation therapy to prevent embolization    Disclaimer: Risk Score calculation is dependent on accuracy of patient problem list and past encounter diagnosis. Plan of Treatment:   1. Afib. Rate remains elevated-110's. On heparin gtt. Continue BB and Dig. No CCB due to cardiomyopathy. Will likely need NEELIMA/CV once diuresed. Will give 1 additional dose of IV dig and check dig level in am.  Will increase BB   2. Elevated trops, and new cardiomyopathy. Will need cardiac cath once acute issues resolve. Continue heparin gtt. 3. ACE on hold due to TOMMIE/Hypotension.         Electronically signed by LAUREN Prakash CNP on 12/23/2020 at 10:55 [id number].  [phone]

## 2020-12-23 NOTE — ED NOTES
Spoke to IR. IR passed that they left at 1600; They need the PT off Heparin in order to do the procedure. PT Needs to be NPO at midnight, and Heparin turned OFF at 0600. PT is to go to IR at 0800.         Nohemy Carl, RN  12/23/20 1495

## 2020-12-23 NOTE — ED NOTES
PT given medication. Upon assessing breathe sounds, audible wheezing noted. PT given meal tray. PT repositioned, has call light within reach, bed in lowest position. Even and unlabored respirations. Will continue to monitor PT.       Annie Carl RN  12/23/20 7057

## 2020-12-23 NOTE — PROGRESS NOTES
[facility]  Occupational Therapy Not Seen Note    DATE: 2020  Name: Tito Schaumann  : 1965  MRN: [id number]    Patient not available for Occupational Therapy due to:    [] Testing:    [] Hemodialysis    [] Blood Transfusion in Progress    []Refusal by Patient:    [] Surgery/Procedure:    [] Strict Bedrest    [] Sedation    [] Spine Precautions     [] Pt being transferred to palliative care at this time. Spoke with pt/family and OT services to be defered. [] Pt independent with functional mobility and functional tasks.  Pt with no OT acute care needs at this time, will defer OT eval.    [x] Other:  at rest, not appropriate for OOB activity     Next Scheduled Treatment: Ck     Belén Santos OTR/L

## 2020-12-23 NOTE — ED NOTES
The following labs labeled with pt sticker and tubed:     [x] Lavender   [] on ice   [x] Blue   [x] Green/yellow  [] Green/black [] on ice  [] Pink  [] Red  [] Yellow     [] COVID-19 swab    [] Rapid     [] Urine Sample  [] Pelvic Cultures    [] Blood Cultures            Rich Whitfield RN  12/23/20 2756

## 2020-12-23 NOTE — PROGRESS NOTES
Physical Therapy  DATE: 2020    NAME: Oksana Powers  MRN: [id number]   : 1965    Patient not seen this date for Physical Therapy due to:  [] Blood transfusion in progress  [] Hemodialysis  [] Patient Declined  [] Spine Precautions   [] Strict Bedrest  [] Surgery/ Procedure  [] Testing      [x] Other- pt in afib with HR increasing to 144 at rest. PT will check back  as time allows. [] PT is being discontinued at this time. Patient independent. No further needs. [] PT is being discontinued at this time due to declining physical/ medical status. Therapy is not appropriate at this time.     Ky Aparicio, PT

## 2020-12-23 NOTE — PROGRESS NOTES
[facility]  Office: [address] [facility], DO, Farooq Verma, DO, Josef Reardon, DO, Rob Clark, DO, Maribel Salcedo MD, Elke Lucero MD, Freedom Proctor MD, Nancy Pacheco MD, Faith Krishnamurthy MD, Augustina Rose MD, Sharma Babinski, MD, Sorin Reddy MD, Troy Wolf MD, Luisito Swann DO, Gerson Blanco MD, Shani Peres MD, Dominguez Mercado, DO, Miranda Blanton MD,  Sid Keys DO, Abimael Bearden MD, Kristian Rebollar MD, Avtar Masters [facility], [facility], [facility], Vipul Talbot, CNP, Dayanara Daly, CNS, Rena Fuentes, CNP, Karey Jimenez, CNP, Kristina Fraser, CNP, Vikki Martinez, CNP, Jeremy Darby, CNP, Kimber Vazquez PA-C, Nubia Reyes, [facility], Jeevan Plascencia, CNP, Jael Ramos, CNP, Isaiah Raines, CNP, Santo Handley, CNP, Dania Tran, [facility]   [address] [facility]    Progress Note    12/23/2020    2:12 PM    Name:   Jackelin Sparks  MRN:     [id number]     Acct:      [de-identified]   [facility]:   35/[facility] Day:  2  Admit Date:  12/21/2020  2:39 PM    PCP:   No primary care provider on file. Code Status:  Full Code    Subjective:     C/C:   Chief Complaint   Patient presents with    Chest Pain     new onset a-fib with rvr     Interval History Status: not changed. Patient seen and examined at bedside. Patient has diffuse anasarca with scrotal swelling, and severe pitting edema. He does admit to some palpitations, heart rate remains elevated ranging between 118 and 140 in atrial fibrillation. Patient with 2350 ml of urine output over the last 24 hours. Total output since admission 3350. Electrolytes remain normal.  Patient denies any chest pain, nausea, vomiting, diarrhea    Brief History: This is a 54-year-old male who presented with diffuse anasarca found to be newly diagnosed heart failure with reduced ejection fraction. EF 35-40% on echocardiogram from 12/22/2020. Patient started on IV diuresis with good response.   Currently monitoring creatinine, electrolytes, I's/O's. Patient also in atrial fibrillation, he was started on metoprolol and digoxin. Cardizem was held due to decreased EF    Review of Systems:     Constitutional:  negative for chills, fevers, sweats  Respiratory: Admits to some dyspnea with exertion and some shortness of breath at rest.  Admits to dry cough. Cardiovascular:  negative for chest pain, chest pressure/discomfort, admits to diffuse lower extremity edema, scrotal edema, abdominal distention. Gastrointestinal:  negative for abdominal pain, constipation, diarrhea, nausea, vomiting. Abdomen is distended per patient  Neurological:  negative for dizziness, headache    Medications: Allergies: Allergies   Allergen Reactions    Seasonal        Current Meds:   Scheduled Meds:    metoprolol tartrate  50 mg Oral BID    digoxin  125 mcg Oral Daily    furosemide  60 mg Intravenous TID    thiamine  100 mg Oral Daily    multivitamin  1 tablet Oral Daily    docusate sodium  100 mg Oral BID    [Held by provider] lisinopril  5 mg Oral Daily    levothyroxine  50 mcg Oral Daily     Continuous Infusions:    heparin (PORCINE) Infusion 7.88 Units/kg/hr (12/23/20 1223)     PRN Meds: sodium chloride flush, bisacodyl, heparin (porcine), heparin (porcine), sodium chloride flush, promethazine **OR** ondansetron, polyethylene glycol, acetaminophen **OR** acetaminophen    Data:     Past Medical History:   has no past medical history on file. Social History:   reports that he has never smoked. His smokeless tobacco use includes chew. He reports current alcohol use of about 30.0 standard drinks of alcohol per week. He reports that he does not use drugs.      Family History:   Family History   Problem Relation Age of Onset    Atrial Fibrillation Mother     Stroke Father        Vitals:  /82   Pulse 124   Temp 97.7 °F (36.5 °C) (Oral)   Resp 22   Ht 5' 11\" (1.803 m)   Wt (!) 320 lb (145.2 kg)   SpO2 97%   BMI 44.63 kg/m²   No data recorded. No results for input(s): POCGLU in the last 72 hours. I/O (24Hr): Intake/Output Summary (Last 24 hours) at 12/23/2020 1412  Last data filed at 12/23/2020 4976  Gross per 24 hour   Intake --   Output 3050 ml   Net -3050 ml       Labs:  Hematology:  Recent Labs     12/21/20  1512 12/22/20  0609 12/23/20  0626   WBC 6.5 5.7 4.9   RBC 4.58 4.22 4.26   HGB 14.4 13.4 13.3   HCT 47.1 43.8 43.4   .8 103.8* 101.9   MCH 31.4 31.8 31.2   MCHC 30.6 30.6 30.6   RDW 12.6 12.7 12.7    152 See Reflexed IPF Result   MPV 12.0 11.7 NOT REPORTED   INR 1.3  --   --      Chemistry:  Recent Labs     12/21/20  1512 12/21/20  1512 12/22/20  0609 12/22/20  0609 12/23/20  0018 12/23/20  0626 12/23/20  1334     --  135  --   --  137  --    K 5.4*  --  4.2  --   --  3.8  --      --  101  --   --  102  --    CO2 20  --  20  --   --  21  --    GLUCOSE 122*  --  115*  --   --  111*  --    BUN 16  --  17  --   --  17  --    CREATININE 1.22*  --  1.23*  --   --  1.20  --    MG  --   --  1.9  --   --  2.0  --    ANIONGAP 15  --  14  --   --  14  --    LABGLOM >60  --  >60  --   --  >60  --    GFRAA >60  --  >60  --   --  >60  --    CALCIUM 9.3  --  9.2  --   --  9.0  --    PHOS  --   --   --   --   --  4.5  --    PROBNP 6,731*  --  6,251*  --   --   --   --    TROPHS 81*   < > 87*   < > 85* 75* 79*    < > = values in this interval not displayed.      Recent Labs     12/21/20  1512 12/22/20  0609 12/23/20  0626   PROT 6.6 6.8  --    LABALBU 3.6 3.5 3.2*   TSH 12.62*  --   --    AST 24 27  --    ALT 18 18  --    ALKPHOS 119 108  --    BILITOT 2.07* 1.78*  --    BILIDIR  --  0.64*  --    CHOL  --  126  --    HDL  --  39*  --    LDLCHOLESTEROL  --  69  --    CHOLHDLRATIO  --  3.2  --    TRIG  --  88  --    VLDL  --  NOT REPORTED  --      ABG:No results found for: POCPH, PHART, PH, POCPCO2, QAE2WRC, PCO2, POCPO2, PO2ART, PO2, POCHCO3, YXQ2BZN, HCO3, NBEA, PBEA, BEART, BE, THGBART, THB, XUP8EAV, GROO1PSS, R9HXWKHF, O2SAT, FIO2  No results found for: SPECIAL  No results found for: CULTURE    Radiology:  Xr Chest (2 Vw)    Result Date: 12/22/2020  Right pleural effusion. Question of whether some of this is loculated. Question of whether there could be a central obstructing process as there is prominence of the right perihilar structures without definite pulmonary congestion. Large body habitus. Xr Abdomen (kub) (single Ap View)    Result Date: 12/21/2020  Nonspecific nonobstructive bowel gas pattern. Xr Chest Portable    Result Date: 12/21/2020  Cardiomegaly and pulmonary vascular congestion. Moderate to large right pleural effusion and right lower lobe airspace disease, atelectasis versus pneumonia. Us Retroperitoneal Complete    Result Date: 12/23/2020  Unremarkable ultrasound of the kidneys and urinary bladder. Moderate ascites. Physical Examination:        General appearance:  alert, cooperative he does he appear uncomfortable  Mental Status:  oriented to person, place and time and normal affect  Lungs: Coarse auscultation in upper and lower lobes bilaterally with diminished breath sounds in the right lung field  Heart:  Irregular heart rate, Tachycardic, no murmur  Abdomen:  Distended, improving compared to 12/22, bowel sounds present. No hepatomegaly  Extremities:  +3 pitting edema, redness, tenderness in the calves. +scrotal edema  Skin:  no gross lesions, rashes, induration    Assessment:        Hospital Problems           Last Modified POA    * (Principal) Acute on chronic systolic congestive heart failure ([address] 12/22/2020 Yes    New onset of congestive heart failure ([address] 12/22/2020 Yes    New onset a-fib ([address] 12/21/2020 Yes    Other specified hypothyroidism 12/21/2020 Yes    Elevated troponin 12/21/2020 Yes    Elevated serum creatinine 12/21/2020 Yes    Class 3 severe obesity due to excess calories with body mass index (BMI) of 40.0 to 44.9 in adult [facility]) 12/21/2020 Yes Edema 12/21/2020 Yes    Constipation 12/21/2020 Yes    Anasarca 12/21/2020 Yes    TSH elevation 12/21/2020 Yes    Hyperkalemia 12/21/2020 Yes    Atrial fibrillation, rapid ([address] 12/21/2020 Yes          Plan:        1. Acute exacerbation of newly diagnosed HFrEF: Echo 12/21 EF: 35-40%. Continue Lasix 60 mg TID, Strict I/O. Net negative 3350 cc. Keep potassium>4, magnesium >2. 1200 cc fluid restriction. Monitor Scr. 2 gram salt restriction. will need cardiac catheterization after acute process resolves. Suspect nonischemic due to ETOH use. 2. Atrial fibrillation with RVR: Rate uncontrolled this morning. Lopressor increased to 50 mg BID, pt given additional dose of Digoxin. Continue heparin gtt for now, Check cost of Eliquis with plans to transition over to PO medication once stable  3. NSTEMI type II: 2/2 atrial fibrillation and CHF exacerbation   4. Subclinical Hypothyroidism: TSH: 12.6. Will start low dose synthroid 50 ug. Will need continued outpatient follow up   5. Large pleural effusion: Likely 2/2 above, diagnostic/theraputic thoracentesis ordered. Will need to hold heparin   6. ETOH abuse: Without symptoms of withdrawal. CIWA Thiamine, folate supplementation. Continue daily MVI. 7. Constipation: continue bowel regimen. 8. Elevated Bilirubin: improving. 9. Obesity with BMI of 44.6  10. Labs, imaging reviewed.       Steffany Keller DO  12/23/2020  2:12 PM

## 2020-12-24 ENCOUNTER — APPOINTMENT (OUTPATIENT)
Dept: ULTRASOUND IMAGING | Age: 55
DRG: 246 | End: 2020-12-24

## 2020-12-24 LAB
ABSOLUTE EOS #: 0.19 K/UL (ref 0–0.44)
ABSOLUTE IMMATURE GRANULOCYTE: 0 K/UL (ref 0–0.3)
ABSOLUTE LYMPH #: 0.56 K/UL (ref 1.1–3.7)
ABSOLUTE MONO #: 0.66 K/UL (ref 0.1–1.2)
ALBUMIN SERPL-MCNC: 3.4 G/DL (ref 3.5–5.2)
ANION GAP SERPL CALCULATED.3IONS-SCNC: 11 MMOL/L (ref 9–17)
BASOPHILS # BLD: 1 % (ref 0–2)
BASOPHILS ABSOLUTE: 0.05 K/UL (ref 0–0.2)
BNP INTERPRETATION: ABNORMAL
BUN BLDV-MCNC: 16 MG/DL (ref 6–20)
BUN/CREAT BLD: ABNORMAL (ref 9–20)
CALCIUM SERPL-MCNC: 8.7 MG/DL (ref 8.6–10.4)
CHLORIDE BLD-SCNC: 98 MMOL/L (ref 98–107)
CO2: 30 MMOL/L (ref 20–31)
CREAT SERPL-MCNC: 1.19 MG/DL (ref 0.7–1.2)
DIFFERENTIAL TYPE: ABNORMAL
DIRECT EXAM: NORMAL
EOSINOPHILS RELATIVE PERCENT: 4 % (ref 1–4)
GFR AFRICAN AMERICAN: >60 ML/MIN
GFR NON-AFRICAN AMERICAN: >60 ML/MIN
GFR SERPL CREATININE-BSD FRML MDRD: ABNORMAL ML/MIN/{1.73_M2}
GFR SERPL CREATININE-BSD FRML MDRD: ABNORMAL ML/MIN/{1.73_M2}
GLUCOSE BLD-MCNC: 111 MG/DL (ref 70–99)
GLUCOSE, FLUID: 110 MG/DL
HCT VFR BLD CALC: 42.3 % (ref 40.7–50.3)
HEMOGLOBIN: 13.1 G/DL (ref 13–17)
IMMATURE GRANULOCYTES: 0 %
LACTATE DEHYDROGENASE, FLUID: 117 U/L
LYMPHOCYTES # BLD: 12 % (ref 24–43)
Lab: NORMAL
MAGNESIUM: 1.5 MG/DL (ref 1.6–2.6)
MCH RBC QN AUTO: 31.3 PG (ref 25.2–33.5)
MCHC RBC AUTO-ENTMCNC: 31 G/DL (ref 28.4–34.8)
MCV RBC AUTO: 101 FL (ref 82.6–102.9)
MONOCYTES # BLD: 14 % (ref 3–12)
MORPHOLOGY: NORMAL
NRBC AUTOMATED: 0 PER 100 WBC
PARTIAL THROMBOPLASTIN TIME: 31.7 SEC (ref 20.5–30.5)
PARTIAL THROMBOPLASTIN TIME: >120 SEC (ref 20.5–30.5)
PDW BLD-RTO: 12.6 % (ref 11.8–14.4)
PH FLUID: 8
PHOSPHORUS: 4.4 MG/DL (ref 2.5–4.5)
PLATELET # BLD: 136 K/UL (ref 138–453)
PLATELET ESTIMATE: ABNORMAL
PMV BLD AUTO: 11.4 FL (ref 8.1–13.5)
POTASSIUM SERPL-SCNC: 3.6 MMOL/L (ref 3.7–5.3)
PRO-BNP: 7813 PG/ML
RBC # BLD: 4.19 M/UL (ref 4.21–5.77)
RBC # BLD: ABNORMAL 10*6/UL
SEG NEUTROPHILS: 69 % (ref 36–65)
SEGMENTED NEUTROPHILS ABSOLUTE COUNT: 3.24 K/UL (ref 1.5–8.1)
SODIUM BLD-SCNC: 139 MMOL/L (ref 135–144)
SPECIMEN DESCRIPTION: NORMAL
SPECIMEN TYPE: NORMAL
TOTAL PROTEIN, BODY FLUID: 2.7 G/DL
TROPONIN INTERP: ABNORMAL
TROPONIN INTERP: ABNORMAL
TROPONIN T: ABNORMAL NG/ML
TROPONIN T: ABNORMAL NG/ML
TROPONIN, HIGH SENSITIVITY: 70 NG/L (ref 0–22)
TROPONIN, HIGH SENSITIVITY: 72 NG/L (ref 0–22)
WBC # BLD: 4.7 K/UL (ref 3.5–11.3)
WBC # BLD: ABNORMAL 10*3/UL

## 2020-12-24 PROCEDURE — 6360000002 HC RX W HCPCS: Performed by: INTERNAL MEDICINE

## 2020-12-24 PROCEDURE — 87070 CULTURE OTHR SPECIMN AEROBIC: CPT

## 2020-12-24 PROCEDURE — 80069 RENAL FUNCTION PANEL: CPT

## 2020-12-24 PROCEDURE — 2709999900 US THORACENTESIS

## 2020-12-24 PROCEDURE — 36415 COLL VENOUS BLD VENIPUNCTURE: CPT

## 2020-12-24 PROCEDURE — 84157 ASSAY OF PROTEIN OTHER: CPT

## 2020-12-24 PROCEDURE — 87205 SMEAR GRAM STAIN: CPT

## 2020-12-24 PROCEDURE — 83735 ASSAY OF MAGNESIUM: CPT

## 2020-12-24 PROCEDURE — 6370000000 HC RX 637 (ALT 250 FOR IP): Performed by: INTERNAL MEDICINE

## 2020-12-24 PROCEDURE — 6370000000 HC RX 637 (ALT 250 FOR IP): Performed by: NURSE PRACTITIONER

## 2020-12-24 PROCEDURE — 99232 SBSQ HOSP IP/OBS MODERATE 35: CPT | Performed by: INTERNAL MEDICINE

## 2020-12-24 PROCEDURE — 85025 COMPLETE CBC W/AUTO DIFF WBC: CPT

## 2020-12-24 PROCEDURE — 87075 CULTR BACTERIA EXCEPT BLOOD: CPT

## 2020-12-24 PROCEDURE — 2060000000 HC ICU INTERMEDIATE R&B

## 2020-12-24 PROCEDURE — 84484 ASSAY OF TROPONIN QUANT: CPT

## 2020-12-24 PROCEDURE — 89051 BODY FLUID CELL COUNT: CPT

## 2020-12-24 PROCEDURE — 6360000002 HC RX W HCPCS: Performed by: NURSE PRACTITIONER

## 2020-12-24 PROCEDURE — 83986 ASSAY PH BODY FLUID NOS: CPT

## 2020-12-24 PROCEDURE — 0W993ZZ DRAINAGE OF RIGHT PLEURAL CAVITY, PERCUTANEOUS APPROACH: ICD-10-PCS | Performed by: RADIOLOGY

## 2020-12-24 PROCEDURE — 82945 GLUCOSE OTHER FLUID: CPT

## 2020-12-24 PROCEDURE — 85730 THROMBOPLASTIN TIME PARTIAL: CPT

## 2020-12-24 PROCEDURE — 6360000002 HC RX W HCPCS: Performed by: STUDENT IN AN ORGANIZED HEALTH CARE EDUCATION/TRAINING PROGRAM

## 2020-12-24 PROCEDURE — 83615 LACTATE (LD) (LDH) ENZYME: CPT

## 2020-12-24 PROCEDURE — 83880 ASSAY OF NATRIURETIC PEPTIDE: CPT

## 2020-12-24 PROCEDURE — 2580000003 HC RX 258: Performed by: STUDENT IN AN ORGANIZED HEALTH CARE EDUCATION/TRAINING PROGRAM

## 2020-12-24 RX ORDER — HEPARIN SODIUM 1000 [USP'U]/ML
10000 INJECTION, SOLUTION INTRAVENOUS; SUBCUTANEOUS ONCE
Status: COMPLETED | OUTPATIENT
Start: 2020-12-24 | End: 2020-12-24

## 2020-12-24 RX ORDER — HEPARIN SODIUM 1000 [USP'U]/ML
10000 INJECTION, SOLUTION INTRAVENOUS; SUBCUTANEOUS PRN
Status: DISCONTINUED | OUTPATIENT
Start: 2020-12-24 | End: 2020-12-27

## 2020-12-24 RX ORDER — POTASSIUM CHLORIDE 20 MEQ/1
40 TABLET, EXTENDED RELEASE ORAL ONCE
Status: COMPLETED | OUTPATIENT
Start: 2020-12-24 | End: 2020-12-24

## 2020-12-24 RX ORDER — HEPARIN SODIUM 1000 [USP'U]/ML
5000 INJECTION, SOLUTION INTRAVENOUS; SUBCUTANEOUS PRN
Status: DISCONTINUED | OUTPATIENT
Start: 2020-12-24 | End: 2020-12-27

## 2020-12-24 RX ORDER — DIGOXIN 125 MCG
125 TABLET ORAL ONCE
Status: COMPLETED | OUTPATIENT
Start: 2020-12-24 | End: 2020-12-24

## 2020-12-24 RX ORDER — HEPARIN SODIUM 5000 [USP'U]/ML
5000 INJECTION, SOLUTION INTRAVENOUS; SUBCUTANEOUS EVERY 8 HOURS SCHEDULED
Status: DISCONTINUED | OUTPATIENT
Start: 2020-12-24 | End: 2020-12-24

## 2020-12-24 RX ORDER — MAGNESIUM SULFATE 1 G/100ML
4 INJECTION INTRAVENOUS ONCE
Status: COMPLETED | OUTPATIENT
Start: 2020-12-24 | End: 2020-12-24

## 2020-12-24 RX ORDER — DIGOXIN 250 MCG
250 TABLET ORAL DAILY
Status: DISCONTINUED | OUTPATIENT
Start: 2020-12-25 | End: 2020-12-30 | Stop reason: HOSPADM

## 2020-12-24 RX ORDER — HEPARIN SODIUM 10000 [USP'U]/100ML
14.46 INJECTION, SOLUTION INTRAVENOUS CONTINUOUS
Status: DISCONTINUED | OUTPATIENT
Start: 2020-12-24 | End: 2020-12-27

## 2020-12-24 RX ORDER — METOPROLOL TARTRATE 50 MG/1
75 TABLET, FILM COATED ORAL 2 TIMES DAILY
Status: DISCONTINUED | OUTPATIENT
Start: 2020-12-24 | End: 2020-12-30 | Stop reason: HOSPADM

## 2020-12-24 RX ADMIN — LEVOTHYROXINE SODIUM 50 MCG: 50 TABLET ORAL at 10:13

## 2020-12-24 RX ADMIN — HEPARIN SODIUM 5000 UNITS: 5000 INJECTION INTRAVENOUS; SUBCUTANEOUS at 13:43

## 2020-12-24 RX ADMIN — FUROSEMIDE 60 MG: 10 INJECTION, SOLUTION INTRAMUSCULAR; INTRAVENOUS at 09:19

## 2020-12-24 RX ADMIN — HEPARIN SODIUM AND DEXTROSE 14.46 UNITS/KG/HR: 10000; 5 INJECTION INTRAVENOUS at 17:27

## 2020-12-24 RX ADMIN — THERA TABS 1 TABLET: TAB at 10:12

## 2020-12-24 RX ADMIN — FUROSEMIDE 60 MG: 10 INJECTION, SOLUTION INTRAMUSCULAR; INTRAVENOUS at 13:44

## 2020-12-24 RX ADMIN — Medication 100 MG: at 10:13

## 2020-12-24 RX ADMIN — METOPROLOL TARTRATE 75 MG: 50 TABLET, FILM COATED ORAL at 20:30

## 2020-12-24 RX ADMIN — FUROSEMIDE 60 MG: 10 INJECTION, SOLUTION INTRAMUSCULAR; INTRAVENOUS at 21:45

## 2020-12-24 RX ADMIN — DIGOXIN 125 MCG: 125 TABLET ORAL at 07:42

## 2020-12-24 RX ADMIN — DOCUSATE SODIUM 100 MG: 100 CAPSULE, LIQUID FILLED ORAL at 09:19

## 2020-12-24 RX ADMIN — AMIODARONE HYDROCHLORIDE 1 MG/MIN: 50 INJECTION, SOLUTION INTRAVENOUS at 18:12

## 2020-12-24 RX ADMIN — HEPARIN SODIUM 10000 UNITS: 1000 INJECTION INTRAVENOUS; SUBCUTANEOUS at 17:27

## 2020-12-24 RX ADMIN — POTASSIUM CHLORIDE 40 MEQ: 1500 TABLET, EXTENDED RELEASE ORAL at 07:44

## 2020-12-24 RX ADMIN — DIGOXIN 125 MCG: 125 TABLET ORAL at 11:13

## 2020-12-24 RX ADMIN — AMIODARONE HYDROCHLORIDE 150 MG: 50 INJECTION, SOLUTION INTRAVENOUS at 18:02

## 2020-12-24 RX ADMIN — METOPROLOL TARTRATE 75 MG: 50 TABLET, FILM COATED ORAL at 08:00

## 2020-12-24 RX ADMIN — MAGNESIUM SULFATE HEPTAHYDRATE 4 G: 1 INJECTION, SOLUTION INTRAVENOUS at 07:45

## 2020-12-24 NOTE — ED NOTES
Pt. Resting in stretcher comfortably, NAD, no needs expressed at this time. Will continue to monitor and reassess.        Dia Palacios RN  12/24/20 7480

## 2020-12-24 NOTE — PROGRESS NOTES
started on metoprolol and digoxin. Cardizem was held due to decreased EF    Review of Systems:     Constitutional:  negative for chills, fevers, sweats  Respiratory: Admits to some dyspnea with exertion and some shortness of breath at rest.  Admits to dry cough. Cardiovascular:  negative for chest pain, chest pressure/discomfort, admits to diffuse lower extremity edema, scrotal edema, abdominal distention. Gastrointestinal:  negative for abdominal pain, constipation, diarrhea, nausea, vomiting. Abdomen is distended per patient  Neurological:  negative for dizziness, headache    Medications: Allergies: Allergies   Allergen Reactions    Seasonal        Current Meds:   Scheduled Meds:    metoprolol tartrate  75 mg Oral BID    [START ON 2020] digoxin  250 mcg Oral Daily    heparin (porcine)  5,000 Units Subcutaneous 3 times per day    furosemide  60 mg Intravenous TID    thiamine  100 mg Oral Daily    multivitamin  1 tablet Oral Daily    docusate sodium  100 mg Oral BID    [Held by provider] lisinopril  5 mg Oral Daily    levothyroxine  50 mcg Oral Daily     Continuous Infusions:     PRN Meds: sodium chloride flush, bisacodyl, heparin (porcine), heparin (porcine), sodium chloride flush, promethazine **OR** ondansetron, polyethylene glycol, acetaminophen **OR** acetaminophen    Data:     Past Medical History:   has no past medical history on file. Social History:   reports that he has never smoked. His smokeless tobacco use includes chew. He reports current alcohol use of about 30.0 standard drinks of alcohol per week. He reports that he does not use drugs.      Family History:   Family History   Problem Relation Age of Onset    Atrial Fibrillation Mother     Stroke Father        Vitals:  /70   Pulse 120   Temp 97.9 °F (36.6 °C) (Oral)   Resp 22   Ht 5' 11\" (1.803 m)   Wt (!) 320 lb (145.2 kg)   SpO2 92%   BMI 44.63 kg/m²   Temp (24hrs), Av.1 °F (36.7 °C), Min:97.7 °F (36.5 °C), Max:98.6 °F (37 °C)    No results for input(s): POCGLU in the last 72 hours. I/O (24Hr): Intake/Output Summary (Last 24 hours) at 12/24/2020 1635  Last data filed at 12/24/2020 1457  Gross per 24 hour   Intake 320 ml   Output 7250 ml   Net -6930 ml       Labs:  Hematology:  Recent Labs     12/22/20  0609 12/23/20  0626 12/23/20  1648 12/24/20  0509   WBC 5.7 4.9  --  4.7   RBC 4.22 4.26  --  4.19*   HGB 13.4 13.3  --  13.1   HCT 43.8 43.4  --  42.3   .8* 101.9  --  101.0   MCH 31.8 31.2  --  31.3   MCHC 30.6 30.6  --  31.0   RDW 12.7 12.7  --  12.6    See Reflexed IPF Result  --  136*   MPV 11.7 NOT REPORTED  --  11.4   INR  --   --  1.3  --      Chemistry:  Recent Labs     12/22/20  0609 12/22/20  0609 12/23/20  0626 12/23/20  1334 12/23/20  1648 12/24/20  0509     --  137  --   --  139   K 4.2  --  3.8  --   --  3.6*     --  102  --   --  98   CO2 20  --  21  --   --  30   GLUCOSE 115*  --  111*  --   --  111*   BUN 17  --  17  --   --  16   CREATININE 1.23*  --  1.20  --   --  1.19   MG 1.9  --  2.0  --   --  1.5*   ANIONGAP 14  --  14  --   --  11   LABGLOM >60  --  >60  --   --  >60   GFRAA >60  --  >60  --   --  >60   CALCIUM 9.2  --  9.0  --   --  8.7   PHOS  --   --  4.5  --   --  4.4   PROBNP 6,251*  --   --   --   --  7,813*   TROPHS 87*   < > 75* 79* 75* 70*    < > = values in this interval not displayed.      Recent Labs     12/22/20  0609 12/23/20  0626 12/23/20  1648 12/24/20  0509   PROT 6.8  --  6.3*  --    LABALBU 3.5 3.2*  --  3.4*   AST 27  --   --   --    ALT 18  --   --   --    LDH  --   --  208  --    ALKPHOS 108  --   --   --    BILITOT 1.78*  --   --   --    BILIDIR 0.64*  --   --   --    CHOL 126  --   --   --    HDL 39*  --   --   --    LDLCHOLESTEROL 69  --   --   --    CHOLHDLRATIO 3.2  --   --   --    TRIG 88  --   --   --    VLDL NOT REPORTED  --   --   --      ABG:No results found for: POCPH, PHART, PH, POCPCO2, HLJ0WIO, PCO2, POCPO2, PO2ART, PO2, POCHCO3, OEE6YWD, HCO3, NBEA, PBEA, BEART, BE, THGBART, THB, XUB6IVX, PNEH2GSK, Q5NSRIFN, O2SAT, FIO2  Lab Results   Component Value Date/Time    SPECIAL NOT REPORTED 12/24/2020 09:29 AM     Lab Results   Component Value Date/Time    CULTURE PENDING 12/24/2020 09:29 AM       Radiology:  Xr Chest (2 Vw)    Result Date: 12/22/2020  Right pleural effusion. Question of whether some of this is loculated. Question of whether there could be a central obstructing process as there is prominence of the right perihilar structures without definite pulmonary congestion. Large body habitus. Xr Abdomen (kub) (single Ap View)    Result Date: 12/21/2020  Nonspecific nonobstructive bowel gas pattern. Xr Chest Portable    Result Date: 12/21/2020  Cardiomegaly and pulmonary vascular congestion. Moderate to large right pleural effusion and right lower lobe airspace disease, atelectasis versus pneumonia. Us Retroperitoneal Complete    Result Date: 12/23/2020  Unremarkable ultrasound of the kidneys and urinary bladder. Moderate ascites. Physical Examination:        General appearance:  alert, cooperative he does he appear uncomfortable  Mental Status:  oriented to person, place and time and normal affect  Lungs: Coarse auscultation in upper and lower lobes bilaterally with diminished breath sounds in the right lung field  Heart:  Irregular heart rate, Tachycardic, no murmur  Abdomen:  Distended, improving compared to 12/22, bowel sounds present. No hepatomegaly  Extremities:  +2 pitting edema, redness, tenderness in the calves. +scrotal edema  Skin:  no gross lesions, rashes, induration    Assessment:        Hospital Problems           Last Modified POA    * (Principal) Acute on chronic systolic congestive heart failure ([address] 12/22/2020 Yes    New onset of congestive heart failure ([address] 12/22/2020 Yes    New onset a-fib ([address] 12/21/2020 Yes    Other specified hypothyroidism 12/21/2020 Yes    Elevated troponin 12/21/2020 Yes    Elevated serum creatinine 12/21/2020 Yes    Class 3 severe obesity due to excess calories with body mass index (BMI) of 40.0 to 44.9 in adult [facility]) 12/21/2020 Yes    Edema 12/21/2020 Yes    Constipation 12/21/2020 Yes    Anasarca 12/21/2020 Yes    TSH elevation 12/21/2020 Yes    Hyperkalemia 12/21/2020 Yes    Atrial fibrillation, rapid ([address] 12/21/2020 Yes          Plan:        1. Acute exacerbation of newly diagnosed HFrEF: Echo 12/21 EF: 35-40%. Continue Lasix 60 mg TID, Strict I/O. Net negative 50450 cc. Keep potassium>4, magnesium >2. 1200 cc fluid restriction. Monitor Scr. 2 gram salt restriction. will need cardiac catheterization after acute process resolves. Suspect nonischemic due to ETOH use. 2. Atrial fibrillation with RVR: Rate uncontrolled this morning. Lopressor increased to 75 mg BID, pt given additional dose of Digoxin 12/24. Continue heparin gtt for now, Check cost of Eliquis with plans to transition over to PO medication once stable  3. NSTEMI type II: 2/2 atrial fibrillation and CHF exacerbation   4. Subclinical Hypothyroidism: TSH: 12.6. Will start low dose synthroid 50 ug. Will need continued outpatient follow up   5. Large pleural effusion: Likely 2/2 above, diagnostic/theraputic thoracentesis 12/24 1.2 L removed  6. ETOH abuse: Without symptoms of withdrawal. CIWA Thiamine, folate supplementation. Continue daily MVI. 7. Constipation: continue bowel regimen. 8. Elevated Bilirubin: improving. 9. Obesity with BMI of 44.6  10. Labs, imaging reviewed.       Gordon Azul DO  12/24/2020  4:35 PM

## 2020-12-24 NOTE — PROGRESS NOTES
[facility]   Progress Note                   Date:   12/24/2020  Patient name: Wild Cordero  Date of admission:  12/21/2020  2:39 PM  MRN:   [id number]  YOB: 1965  PCP: No primary care provider on file. Reason for Admission: New onset of congestive heart failure ([facility] [address] [I50.9]    Subjective:       Clinical Changes / Abnormalities: Pt seen and examined in the room, tx to floor yesterday afternoon. Pt denies any CP and states breathing is \"much better. \" on RA presently. Admits to continued \"really bad swelling. \"  Remains Afib with RVR, rate 100-110's. Medications:   Scheduled Meds:   metoprolol tartrate  75 mg Oral BID    magnesium sulfate  4 g Intravenous Once    digoxin  125 mcg Oral Daily    furosemide  60 mg Intravenous TID    thiamine  100 mg Oral Daily    multivitamin  1 tablet Oral Daily    docusate sodium  100 mg Oral BID    [Held by provider] lisinopril  5 mg Oral Daily    levothyroxine  50 mcg Oral Daily     Continuous Infusions:   heparin (PORCINE) Infusion Stopped (12/24/20 0555)     CBC:   Recent Labs     12/22/20  0609 12/23/20  0626 12/24/20  0509   WBC 5.7 4.9 4.7   HGB 13.4 13.3 13.1    See Reflexed IPF Result 136*     BMP:    Recent Labs     12/22/20  0609 12/23/20  0626 12/24/20  0509    137 139   K 4.2 3.8 3.6*    102 98   CO2 20 21 30   BUN 17 17 16   CREATININE 1.23* 1.20 1.19   GLUCOSE 115* 111* 111*     Hepatic:   Recent Labs     12/21/20  1512 12/22/20  0609   AST 24 27   ALT 18 18   BILITOT 2.07* 1.78*   ALKPHOS 119 108     Troponin:   Recent Labs     12/23/20  1334 12/23/20  1648 12/24/20  0509   TROPHS 79* 75* 70*     BNP: No results for input(s): BNP in the last 72 hours.   Lipids:   Recent Labs     12/22/20  0609   CHOL 126   HDL 39*     INR:   Recent Labs     12/21/20  1512 12/23/20  1648   INR 1.3 1.3     ECHO 12/22/20  Summary  Left ventricle is normal in size with Moderately reduced systolic function,  visually estimated LVEF 35-40%. Evidence of diastolic dysfunction. Left atrium is moderately dilated. Right atrial enlargement. Aortic sclerosis with mild focal calcification. Mild mitral regurgitation. Mild tricuspid regurgitation. Estimated right ventricular systolic pressure  is 29 mmHg. Objective:   Vitals: /86   Pulse 122   Temp 98.6 °F (37 °C) (Oral)   Resp 24   Ht 5' 11\" (1.803 m)   Wt (!) 320 lb (145.2 kg)   SpO2 95%   BMI 44.63 kg/m²   General appearance: alert and cooperative with exam  HEENT: Head: Normocephalic, no lesions, without obvious abnormality. Neck: no JVD, trachea midline, no adenopathy  Lungs: Diminished  to auscultation throughout. NO rales. On RA  Heart: irregular rate and rhythm, s1/s2 auscultated, no murmurs, afib with RVR   Abdomen: distended. Firm   Extremities: +2 bilateral  Edema, right > left  Neurologic: not done        Assessment / Acute Cardiac Problems:   1. Generalized anasarca   2. New onset atrial fibrillation with RVR CHADSVASC 0  3. Pulmonary edema with right sided pleural effusion  4. New onset congestive heart failure, acute systolic  5. Elevated troponin - likely demand ischemia from tachycardia & CHF  6. Newly diagnosed Hypothyroidism  7. Acute kidney injury - likely secondary to cardiorenal type I  8. Hyperkalemia  9.  Morbid obesity - BMI 44.6    Patient Active Problem List:     New onset of congestive heart failure (HCC)     New onset a-fib ([address]     Other specified hypothyroidism     Elevated troponin     Elevated serum creatinine     Class 3 severe obesity due to excess calories with body mass index (BMI) of 40.0 to 44.9 in adult [facility])     Edema     Constipation     Anasarca     TSH elevation     Hyperkalemia     Atrial fibrillation, rapid (HCC)     Atrial fibrillation (HCC)     Acute on chronic systolic congestive heart failure (HCC)    [id number] Score for Atrial Fibrillation Stroke Risk   Risk   Factors  Component Value   C CHF Yes 1   H HTN No 0 A2 Age >= 75 No,  (54 y.o.) 0   D DM No 0   S2 Prior Stroke/TIA No 0   V Vascular Disease No 0   A Age 74-69 No,  (54 y.o.) 0   Sc Sex male 0    [id number]  Score  1   Score last updated 12/23/20 05:28 AM EST    Click here for a link to the UpToDate guideline \"Atrial Fibrillation: Anticoagulation therapy to prevent embolization    Disclaimer: Risk Score calculation is dependent on accuracy of patient problem list and past encounter diagnosis. Plan of Treatment:   1. Afib. Rate remains elevated-110's. Heparin drip stopped d/t thorocentesis. Will start FWB Lovenox BID. Continue BB and Dig (increase Dig). No CCB due to cardiomyopathy. Will likely need NEELIMA/CV once diuresed. 2. Elevated trops, and new cardiomyopathy. Will need cardiac cath once acute issues resolve. Continue SQ Lovenox & BB.   3. Replace K and Mg  4. Cath once breathing improved, more diuresed. Likely Monday.        Electronically signed by LAUREN Rodgers CNP on 12/24/2020 at 9:55 AM  [address]  [phone]

## 2020-12-24 NOTE — ED NOTES
Report received from [address] [place] [address] , RNs,   Pt. Resting in stretcher comfortably, NAD, no needs expressed at this time. Will continue to monitor and reassess.        Lilton Schlatter, RN  12/23/20 1929

## 2020-12-24 NOTE — ED NOTES
Report given to FLORA Real on floor 4A  All questions answered.  Pt good to go to floor to Sofya Abreu, RN  12/24/20 0007

## 2020-12-24 NOTE — PROGRESS NOTES
PT HAD RT. THORACENTESIS TODAY. [address] FLUID COLLECTED. 2X2 GAUZE AND TEGADERM TO CATHETER SITE. DRY AND INTACT. SPECIMEN SENT. PT TOLERATED WELL. VSS. REPORT TO FLOOR. READY FOR TRANSPORT.

## 2020-12-24 NOTE — PROGRESS NOTES
Physical Therapy  DATE: 2020    NAME: Cata Stevenson  MRN: [id number]   : 1965    Patient not seen this date for Physical Therapy due to:  [] Blood transfusion in progress  [] Hemodialysis  [] Patient Declined  [] Spine Precautions   [] Strict Bedrest  [x] Surgery/ Procedure- thoracentesis this AM.  [] Testing      [x] Other- awaiting cardiac cath due to elevated trops with acute cardiomyopathy. PT will check back as time allows. [] PT is being discontinued at this time. Patient independent. No further needs. [] PT is being discontinued at this time due to declining physical/ medical status. Therapy is not appropriate at this time.     [place] Carter, PT

## 2020-12-24 NOTE — PROGRESS NOTES
Nutrition Education    Consulted for diet education. Pt admitted with c/o chest pain, SOB, bilateral leg swelling, anasarca, and weight gain. Pt newly diagnosed with CHF. Provided pt with handout on CHF/Low Sodium diet. Pt reports he does use salt when preparing meals and eats many processed foods. Encouraged pt to limit salt he adds to his meals after cooking and to choose low sodium options when he is able to. Reviewed handout. Pt receptive to information provided. Handout and contact information left with patient. Will provide additional diet education as needed and monitor PO intakes. · Verbally reviewed information with Patient  · Educated on CHF/Low Sodium diet. · Written educational materials provided. · Contact name and number provided. · Refer to Patient Education activity for more details.     Electronically signed by Joshua Frye RD, MARYSOL on 12/24/20 at 2:14 PM EST    Contact: [phone]

## 2020-12-24 NOTE — ED NOTES
The following labs labeled with pt sticker and tubed:     [x] Lavender   [] on ice   [x] Blue   [x] Green/yellow  [] Green/black [] on ice  [] Pink  [] Red  [] Yellow     [] COVID-19 swab    [] Rapid     [] Urine Sample  [] Pelvic Cultures    [] Blood Cultures            Bang Ibarra RN  12/24/20 0791

## 2020-12-24 NOTE — PROGRESS NOTES
Occupational Therapy Not Seen Note    DATE: 2020  Name: Jean-Paul Conde  : 1965  MRN: [id number]    Patient not available for Occupational Therapy due to:     Other: elevated trops, possible cardiac cath monday     Next Scheduled Treatment: check back 2020    Electronically signed by SHOAIB Ramey on 2020 at 10:54 AM

## 2020-12-24 NOTE — CARE COORDINATION
Case Management Initial Discharge Plan  Fanny Bravo,             Met with:patient to discuss discharge plans. Information verified: address, contacts, phone number, , insurance Yes    Emergency Contact/Next of Kin name & number: Adah Saint [phone]    PCP: No primary care provider on file. Date of last visit: na    Insurance Provider: [employer]    Discharge Planning    Living Arrangements:  Alone   Support Systems:  Friends/Neighbors    Home has 1 stories  1 stairs to climb to get into front door, 0 stairs to climb to reach second floor  Location of bedroom/bathroom in home main floor    Patient able to perform ADL's:Independent    Current Services (outpatient & in home) none  DME equipment: none  DME provider:       Receiving oral anticoagulation therapy? Eliquis    If indicated:    Physician managing anticoagulation treatment:   Where does patient obtain lab work for ATC treatment? Potential Assistance Needed:  Home Care    Patient agreeable to home care: Yes, discussed  Freedom of choice provided:  yes    Prior SNF/Rehab Placement and Facility: no   Agreeable to SNF/Rehab: No  [place] of choice provided: n/a     Evaluation: no    Expected Discharge date:  20    Patient expects to be discharged to:   home  Follow Up Appointment: Best Day/ Time:      Transportation provider: thinks he can get a ride  Transportation arrangements needed for discharge: No    Readmission Risk              Risk of Unplanned Readmission:        11             Does patient have a readmission risk score greater than 14?: No  If yes, follow-up appointment must be made within 7 days of discharge.      Goals of Care:       Discharge Plan: Home, potential Home care          Electronically signed by Cristóbal Ansari RN on 20 at 6:04 PM EST

## 2020-12-25 PROBLEM — E87.6 ACUTE HYPOKALEMIA: Status: ACTIVE | Noted: 2020-12-25

## 2020-12-25 LAB
ABSOLUTE EOS #: 0.2 K/UL (ref 0–0.44)
ABSOLUTE IMMATURE GRANULOCYTE: <0.03 K/UL (ref 0–0.3)
ABSOLUTE LYMPH #: 0.72 K/UL (ref 1.1–3.7)
ABSOLUTE MONO #: 0.59 K/UL (ref 0.1–1.2)
ALBUMIN SERPL-MCNC: 3.2 G/DL (ref 3.5–5.2)
ANION GAP SERPL CALCULATED.3IONS-SCNC: 11 MMOL/L (ref 9–17)
BASOPHILS # BLD: 2 % (ref 0–2)
BASOPHILS ABSOLUTE: 0.08 K/UL (ref 0–0.2)
BUN BLDV-MCNC: 16 MG/DL (ref 6–20)
BUN/CREAT BLD: ABNORMAL (ref 9–20)
CALCIUM SERPL-MCNC: 8.8 MG/DL (ref 8.6–10.4)
CHLORIDE BLD-SCNC: 97 MMOL/L (ref 98–107)
CO2: 28 MMOL/L (ref 20–31)
CREAT SERPL-MCNC: 1.03 MG/DL (ref 0.7–1.2)
DIFFERENTIAL TYPE: ABNORMAL
EOSINOPHILS RELATIVE PERCENT: 4 % (ref 1–4)
GFR AFRICAN AMERICAN: >60 ML/MIN
GFR NON-AFRICAN AMERICAN: >60 ML/MIN
GFR SERPL CREATININE-BSD FRML MDRD: ABNORMAL ML/MIN/{1.73_M2}
GFR SERPL CREATININE-BSD FRML MDRD: ABNORMAL ML/MIN/{1.73_M2}
GLUCOSE BLD-MCNC: 122 MG/DL (ref 70–99)
HCT VFR BLD CALC: 40.7 % (ref 40.7–50.3)
HEMOGLOBIN: 12.8 G/DL (ref 13–17)
IMMATURE GRANULOCYTES: 0 %
LYMPHOCYTES # BLD: 16 % (ref 24–43)
MAGNESIUM: 1.9 MG/DL (ref 1.6–2.6)
MCH RBC QN AUTO: 31.8 PG (ref 25.2–33.5)
MCHC RBC AUTO-ENTMCNC: 31.4 G/DL (ref 28.4–34.8)
MCV RBC AUTO: 101 FL (ref 82.6–102.9)
MONOCYTES # BLD: 13 % (ref 3–12)
NRBC AUTOMATED: 0 PER 100 WBC
PARTIAL THROMBOPLASTIN TIME: 104.2 SEC (ref 20.5–30.5)
PARTIAL THROMBOPLASTIN TIME: 42.5 SEC (ref 20.5–30.5)
PARTIAL THROMBOPLASTIN TIME: 56.8 SEC (ref 20.5–30.5)
PARTIAL THROMBOPLASTIN TIME: 57.4 SEC (ref 20.5–30.5)
PDW BLD-RTO: 12.5 % (ref 11.8–14.4)
PHOSPHORUS: 3.4 MG/DL (ref 2.5–4.5)
PLATELET # BLD: 135 K/UL (ref 138–453)
PLATELET ESTIMATE: ABNORMAL
PMV BLD AUTO: 11.2 FL (ref 8.1–13.5)
POTASSIUM SERPL-SCNC: 3.4 MMOL/L (ref 3.7–5.3)
RBC # BLD: 4.03 M/UL (ref 4.21–5.77)
RBC # BLD: ABNORMAL 10*6/UL
SEG NEUTROPHILS: 65 % (ref 36–65)
SEGMENTED NEUTROPHILS ABSOLUTE COUNT: 2.98 K/UL (ref 1.5–8.1)
SODIUM BLD-SCNC: 136 MMOL/L (ref 135–144)
TROPONIN INTERP: ABNORMAL
TROPONIN T: ABNORMAL NG/ML
TROPONIN, HIGH SENSITIVITY: 74 NG/L (ref 0–22)
WBC # BLD: 4.6 K/UL (ref 3.5–11.3)
WBC # BLD: ABNORMAL 10*3/UL

## 2020-12-25 PROCEDURE — 6370000000 HC RX 637 (ALT 250 FOR IP): Performed by: INTERNAL MEDICINE

## 2020-12-25 PROCEDURE — 80069 RENAL FUNCTION PANEL: CPT

## 2020-12-25 PROCEDURE — 6360000002 HC RX W HCPCS: Performed by: INTERNAL MEDICINE

## 2020-12-25 PROCEDURE — 2580000003 HC RX 258: Performed by: NURSE PRACTITIONER

## 2020-12-25 PROCEDURE — 6370000000 HC RX 637 (ALT 250 FOR IP): Performed by: NURSE PRACTITIONER

## 2020-12-25 PROCEDURE — 6360000002 HC RX W HCPCS: Performed by: STUDENT IN AN ORGANIZED HEALTH CARE EDUCATION/TRAINING PROGRAM

## 2020-12-25 PROCEDURE — 85730 THROMBOPLASTIN TIME PARTIAL: CPT

## 2020-12-25 PROCEDURE — 6360000002 HC RX W HCPCS: Performed by: NURSE PRACTITIONER

## 2020-12-25 PROCEDURE — 2580000003 HC RX 258: Performed by: STUDENT IN AN ORGANIZED HEALTH CARE EDUCATION/TRAINING PROGRAM

## 2020-12-25 PROCEDURE — 85025 COMPLETE CBC W/AUTO DIFF WBC: CPT

## 2020-12-25 PROCEDURE — 83735 ASSAY OF MAGNESIUM: CPT

## 2020-12-25 PROCEDURE — 84484 ASSAY OF TROPONIN QUANT: CPT

## 2020-12-25 PROCEDURE — 99232 SBSQ HOSP IP/OBS MODERATE 35: CPT | Performed by: INTERNAL MEDICINE

## 2020-12-25 PROCEDURE — 2060000000 HC ICU INTERMEDIATE R&B

## 2020-12-25 PROCEDURE — 36415 COLL VENOUS BLD VENIPUNCTURE: CPT

## 2020-12-25 RX ORDER — SPIRONOLACTONE 25 MG/1
25 TABLET ORAL DAILY
Status: DISCONTINUED | OUTPATIENT
Start: 2020-12-25 | End: 2020-12-30 | Stop reason: HOSPADM

## 2020-12-25 RX ORDER — POTASSIUM CHLORIDE 20 MEQ/1
40 TABLET, EXTENDED RELEASE ORAL ONCE
Status: COMPLETED | OUTPATIENT
Start: 2020-12-25 | End: 2020-12-25

## 2020-12-25 RX ORDER — POTASSIUM CHLORIDE 20 MEQ/1
40 TABLET, EXTENDED RELEASE ORAL PRN
Status: DISCONTINUED | OUTPATIENT
Start: 2020-12-25 | End: 2020-12-30 | Stop reason: HOSPADM

## 2020-12-25 RX ORDER — POTASSIUM CHLORIDE 7.45 MG/ML
10 INJECTION INTRAVENOUS PRN
Status: DISCONTINUED | OUTPATIENT
Start: 2020-12-25 | End: 2020-12-30 | Stop reason: HOSPADM

## 2020-12-25 RX ADMIN — FUROSEMIDE 60 MG: 10 INJECTION, SOLUTION INTRAMUSCULAR; INTRAVENOUS at 09:04

## 2020-12-25 RX ADMIN — DIGOXIN 250 MCG: 250 TABLET ORAL at 09:04

## 2020-12-25 RX ADMIN — SPIRONOLACTONE 25 MG: 25 TABLET ORAL at 11:19

## 2020-12-25 RX ADMIN — AMIODARONE HYDROCHLORIDE 1 MG/MIN: 50 INJECTION, SOLUTION INTRAVENOUS at 01:00

## 2020-12-25 RX ADMIN — HEPARIN SODIUM 5000 UNITS: 1000 INJECTION INTRAVENOUS; SUBCUTANEOUS at 22:14

## 2020-12-25 RX ADMIN — FUROSEMIDE 60 MG: 10 INJECTION, SOLUTION INTRAMUSCULAR; INTRAVENOUS at 15:59

## 2020-12-25 RX ADMIN — METOPROLOL TARTRATE 75 MG: 50 TABLET, FILM COATED ORAL at 21:09

## 2020-12-25 RX ADMIN — POTASSIUM CHLORIDE 40 MEQ: 1500 TABLET, EXTENDED RELEASE ORAL at 15:59

## 2020-12-25 RX ADMIN — POTASSIUM BICARBONATE 40 MEQ: 782 TABLET, EFFERVESCENT ORAL at 12:25

## 2020-12-25 RX ADMIN — MAGNESIUM GLUCONATE 500 MG ORAL TABLET 400 MG: 500 TABLET ORAL at 11:20

## 2020-12-25 RX ADMIN — AMIODARONE HYDROCHLORIDE 0.5 MG/MIN: 50 INJECTION, SOLUTION INTRAVENOUS at 23:24

## 2020-12-25 RX ADMIN — LEVOTHYROXINE SODIUM 50 MCG: 50 TABLET ORAL at 06:05

## 2020-12-25 RX ADMIN — HEPARIN SODIUM 5000 UNITS: 1000 INJECTION INTRAVENOUS; SUBCUTANEOUS at 02:49

## 2020-12-25 RX ADMIN — POTASSIUM CHLORIDE 40 MEQ: 1500 TABLET, EXTENDED RELEASE ORAL at 06:05

## 2020-12-25 RX ADMIN — HEPARIN SODIUM AND DEXTROSE 14.46 UNITS/KG/HR: 10000; 5 INJECTION INTRAVENOUS at 22:14

## 2020-12-25 RX ADMIN — HEPARIN SODIUM AND DEXTROSE 10.46 UNITS/KG/HR: 10000; 5 INJECTION INTRAVENOUS at 11:20

## 2020-12-25 RX ADMIN — DOCUSATE SODIUM 100 MG: 100 CAPSULE, LIQUID FILLED ORAL at 21:09

## 2020-12-25 RX ADMIN — Medication 100 MG: at 09:04

## 2020-12-25 RX ADMIN — METOPROLOL TARTRATE 75 MG: 50 TABLET, FILM COATED ORAL at 09:03

## 2020-12-25 RX ADMIN — FUROSEMIDE 60 MG: 10 INJECTION, SOLUTION INTRAMUSCULAR; INTRAVENOUS at 21:13

## 2020-12-25 RX ADMIN — DOCUSATE SODIUM 100 MG: 100 CAPSULE, LIQUID FILLED ORAL at 09:04

## 2020-12-25 RX ADMIN — THERA TABS 1 TABLET: TAB at 09:03

## 2020-12-25 ASSESSMENT — PAIN DESCRIPTION - LOCATION: LOCATION: SCROTUM

## 2020-12-25 ASSESSMENT — PAIN SCALES - GENERAL
PAINLEVEL_OUTOF10: 5
PAINLEVEL_OUTOF10: 0

## 2020-12-25 ASSESSMENT — PAIN DESCRIPTION - ONSET: ONSET: ON-GOING

## 2020-12-25 NOTE — PROGRESS NOTES
Physical Therapy  DATE: 2020    NAME: Kierra De Los Santos  MRN: [id number]   : 1965    Patient not seen this date for Physical Therapy due to:  [] Blood transfusion in progress  [] Hemodialysis  [] Patient Declined  [] Spine Precautions   [] Strict Bedrest  [] Surgery/ Procedure  [] Testing      [x] Other: afib, RVR HR just over 100s bpm supine sleeping. PPT > 100. Anticipated next check         [] PT is being discontinued at this time. Patient independent. No further needs. [] PT is being discontinued at this time due to declining physical/ medical status. Therapy is not appropriate at this time.     Fay Esquivel, PT

## 2020-12-25 NOTE — PROGRESS NOTES
[facility]   Progress Note                   Date:   12/25/2020  Patient name: John Cheng  Date of admission:  12/21/2020  2:39 PM  MRN:   [id number]  YOB: 1965  PCP: No primary care provider on file. Reason for Admission: New onset of congestive heart failure ([facility] [address] [I50.9]    Subjective:       Clinical Changes / Abnormalities: Pt seen and examined in the room. Pt denies any CP or SOB presently. Admits to continued \"really bad swelling. \"  Remains Afib with RVR with rate 90-100s      Medications:   Scheduled Meds:   metoprolol tartrate  75 mg Oral BID    digoxin  250 mcg Oral Daily    furosemide  60 mg Intravenous TID    thiamine  100 mg Oral Daily    multivitamin  1 tablet Oral Daily    docusate sodium  100 mg Oral BID    [Held by provider] lisinopril  5 mg Oral Daily    levothyroxine  50 mcg Oral Daily     Continuous Infusions:   amiodarone 1 mg/min (12/25/20 0100)    heparin (PORCINE) Infusion 12.46 Units/kg/hr (12/25/20 0248)     CBC:   Recent Labs     12/23/20  0626 12/24/20  0509 12/25/20  0204   WBC 4.9 4.7 4.6   HGB 13.3 13.1 12.8*   PLT See Reflexed IPF Result 136* 135*     BMP:    Recent Labs     12/23/20  0626 12/24/20  0509 12/25/20  0204    139 136   K 3.8 3.6* 3.4*    98 97*   CO2 21 30 28   BUN 17 16 16   CREATININE 1.20 1.19 1.03   GLUCOSE 111* 111* 122*     Hepatic:   No results for input(s): AST, ALT, ALB, BILITOT, ALKPHOS in the last 72 hours. Troponin:   Recent Labs     12/24/20  0509 12/24/20  1942 12/25/20  0204   TROPHS 70* 72* 74*     BNP: No results for input(s): BNP in the last 72 hours. Lipids:   No results for input(s): CHOL, HDL in the last 72 hours. Invalid input(s): LDLCALCU  INR:   Recent Labs     12/23/20  1648   INR 1.3     ECHO 12/22/20  Summary  Left ventricle is normal in size with Moderately reduced systolic function,  visually estimated LVEF 35-40%. Evidence of diastolic dysfunction.   Left atrium is moderately dilated. Right atrial enlargement. Aortic sclerosis with mild focal calcification. Mild mitral regurgitation. Mild tricuspid regurgitation. Estimated right ventricular systolic pressure  is 29 mmHg. Objective:   Vitals: BP (!) 131/97   Pulse 94   Temp 97.9 °F (36.6 °C)   Resp 19   Ht 5' 11\" (1.803 m)   Wt (!) 320 lb (145.2 kg)   SpO2 98%   BMI 44.63 kg/m²   General appearance: alert and cooperative with exam  HEENT: Head: Normocephalic, no lesions, without obvious abnormality. Neck: no JVD, trachea midline, no adenopathy  Lungs: Diminished  to auscultation throughout. NO rales. On RA  Heart: irregular rate and rhythm, s1/s2 auscultated, no murmurs, afib   Abdomen: distended. Firm   Extremities: +2 bilateral  Edema, right > left  Neurologic: not done        Assessment / Acute Cardiac Problems:   1. Generalized anasarca   2. New onset atrial fibrillation with RVR CHADSVASC 0  3. Pulmonary edema with right sided pleural effusion  4. New onset congestive heart failure, acute systolic  5. Elevated troponin - likely demand ischemia from tachycardia & CHF  6. Newly diagnosed Hypothyroidism  7. Acute kidney injury - likely secondary to cardiorenal type I  8. Hyperkalemia  9.  Morbid obesity - BMI 44.6    Patient Active Problem List:     New onset of congestive heart failure (HCC)     New onset a-fib ([address]     Other specified hypothyroidism     Elevated troponin     Elevated serum creatinine     Class 3 severe obesity due to excess calories with body mass index (BMI) of 40.0 to 44.9 in adult [facility])     Edema     Constipation     Anasarca     TSH elevation     Hyperkalemia     Atrial fibrillation, rapid (HCC)     Atrial fibrillation (HCC)     Acute on chronic systolic congestive heart failure (HCC)    [id number] Score for Atrial Fibrillation Stroke Risk   Risk   Factors  Component Value   C CHF Yes 1   H HTN No 0   A2 Age >= 75 No,  (54 y.o.) 0   D DM No 0   S2 Prior Stroke/TIA No 0   V Vascular Disease No 0   A Age 74-69 No,  (54 y.o.) 0   Sc Sex male 0    [id number]  Score  1   Score last updated 12/23/20 19:68 AM EST    Click here for a link to the UpToDate guideline \"Atrial Fibrillation: Anticoagulation therapy to prevent embolization    Disclaimer: Risk Score calculation is dependent on accuracy of patient problem list and past encounter diagnosis. Plan of Treatment:   1. Afib. Started on Amio gtt yesterady. Continue FWB Lovenox BID. Continue BB and Dig . No CCB due to cardiomyopathy. Will likely need NEELIMA/CV once diuresed. 2. Elevated trops, and new cardiomyopathy. Will need cardiac cath once acute issues resolve. Continue SQ Lovenox & BB.   3. Replace K and Mg. Keep K > 4 and Mg > 2  4. Cath once breathing improved, more diuresed. Add Aldactone. Likely Monday.        Electronically signed by LAUREN Kim CNP on 12/25/2020 at 7:07 [address] [place] [address].  [phone]

## 2020-12-25 NOTE — PROGRESS NOTES
includes chew. He reports current alcohol use of about 30.0 standard drinks of alcohol per week. He reports that he does not use drugs. Family History:   Family History   Problem Relation Age of Onset    Atrial Fibrillation Mother     Stroke Father        Vitals:  /74   Pulse 98   Temp 97.9 °F (36.6 °C) (Oral)   Resp 16   Ht 5' 11\" (1.803 m)   Wt (!) 317 lb 7.4 oz (144 kg)   SpO2 96%   BMI 44.28 kg/m²   Temp (24hrs), Av.1 °F (36.7 °C), Min:97.9 °F (36.6 °C), Max:98.3 °F (36.8 °C)    No results for input(s): POCGLU in the last 72 hours. I/O (24Hr): Intake/Output Summary (Last 24 hours) at 2020 1327  Last data filed at 2020 1230  Gross per 24 hour   Intake 1514.43 ml   Output 4525 ml   Net -3010.57 ml       Labs:  Hematology:  Recent Labs     20  0620  1648 20  0509 20  0204   WBC 4.9  --  4.7 4.6   RBC 4.26  --  4.19* 4.03*   HGB 13.3  --  13.1 12.8*   HCT 43.4  --  42.3 40.7   .9  --  101.0 101.0   MCH 31.2  --  31.3 31.8   MCHC 30.6  --  31.0 31.4   RDW 12.7  --  12.6 12.5   PLT See Reflexed IPF Result  --  136* 135*   MPV NOT REPORTED  --  11.4 11.2   INR  --  1.3  --   --      Chemistry:  Recent Labs     20  0620  0626 20  0509 20  1942 20  0204     --  139  --  136   K 3.8  --  3.6*  --  3.4*     --  98  --  97*   CO2 21  --  30  --  28   GLUCOSE 111*  --  111*  --  122*   BUN 17  --  16  --  16   CREATININE 1.20  --  1.19  --  1.03   MG 2.0  --  1.5*  --  1.9   ANIONGAP 14  --  11  --  11   LABGLOM >60  --  >60  --  >60   GFRAA >60  --  >60  --  >60   CALCIUM 9.0  --  8.7  --  8.8   PHOS 4.5  --  4.4  --  3.4   PROBNP  --   --  9,694*  --   --    TROPHS 75*   < > 70* 72* 74*    < > = values in this interval not displayed.      Recent Labs     20  0626 20  1648 20  0509 20  0204   PROT  --  6.3*  --   --    LABALBU 3.2*  --  3.4* 3.2*   LDH  --  208  --   --      ABG:No 12/22/2020 Yes    New onset a-fib ([facility] [address] 12/21/2020 Yes    Other specified hypothyroidism 12/21/2020 Yes    Elevated troponin 12/21/2020 Yes    Elevated serum creatinine 12/21/2020 Yes    Class 3 severe obesity due to excess calories with body mass index (BMI) of 40.0 to 44.9 in adult [facility]) 12/21/2020 Yes    Edema 12/21/2020 Yes    Constipation 12/21/2020 Yes    Anasarca 12/21/2020 Yes    TSH elevation 12/21/2020 Yes    Hyperkalemia 12/21/2020 Yes    Atrial fibrillation, rapid ([facility] [address] 12/21/2020 Yes    Acute hypokalemia 12/25/2020 Yes          Plan:        1. Acute exacerbation of newly diagnosed HFrEF: Echo 12/21 EF: 35-40%. Continue Lasix 60 mg TID, Strict I/O. Net negative 14L. Keep potassium>4, magnesium >2. 1200 cc fluid restriction. Monitor Scr. 2 gram salt restriction. will need cardiac catheterization after acute process resolves. Suspect nonischemic due to ETOH use. 2. Atrial fibrillation with RVR: Rate better controlled. Lopressor 75 mg BID, Digoxin 250 ug, amiodarone (added 12/25). Continue heparin gtt for now, Check cost of Eliquis with plans to transition over to PO medication once stable  3. Acute hypokalemia: Replace electrolytes today, keep potassium above 4  4. NSTEMI type II: 2/2 atrial fibrillation and CHF exacerbation. Will need cardiac catheterization prior to discharge. 5. Subclinical Hypothyroidism: TSH: 12.6. Will start low dose synthroid 50 ug. Will need continued outpatient follow up   6. Large pleural effusion: Likely 2/2 above, diagnostic/theraputic thoracentesis 12/24 1.2 L removed, Transudate by lights criteria   7. ETOH abuse: Without symptoms of withdrawal. CIWA Thiamine, folate supplementation. Continue daily MVI. 8. Constipation: continue bowel regimen. 9. Elevated Bilirubin: improving. 10. Obesity with BMI of 44.6  11. Labs, imaging reviewed.       Inna Barrera DO  12/25/2020  1:27 PM

## 2020-12-26 LAB
ABSOLUTE EOS #: 0.21 K/UL (ref 0–0.44)
ABSOLUTE IMMATURE GRANULOCYTE: <0.03 K/UL (ref 0–0.3)
ABSOLUTE LYMPH #: 0.79 K/UL (ref 1.1–3.7)
ABSOLUTE MONO #: 0.58 K/UL (ref 0.1–1.2)
ALBUMIN SERPL-MCNC: 3.3 G/DL (ref 3.5–5.2)
ANION GAP SERPL CALCULATED.3IONS-SCNC: 13 MMOL/L (ref 9–17)
BASOPHILS # BLD: 1 % (ref 0–2)
BASOPHILS ABSOLUTE: 0.05 K/UL (ref 0–0.2)
BNP INTERPRETATION: ABNORMAL
BUN BLDV-MCNC: 14 MG/DL (ref 6–20)
BUN/CREAT BLD: ABNORMAL (ref 9–20)
CALCIUM SERPL-MCNC: 8.8 MG/DL (ref 8.6–10.4)
CHLORIDE BLD-SCNC: 95 MMOL/L (ref 98–107)
CO2: 27 MMOL/L (ref 20–31)
CREAT SERPL-MCNC: 1 MG/DL (ref 0.7–1.2)
DIFFERENTIAL TYPE: ABNORMAL
EOSINOPHILS RELATIVE PERCENT: 5 % (ref 1–4)
GFR AFRICAN AMERICAN: >60 ML/MIN
GFR NON-AFRICAN AMERICAN: >60 ML/MIN
GFR SERPL CREATININE-BSD FRML MDRD: ABNORMAL ML/MIN/{1.73_M2}
GFR SERPL CREATININE-BSD FRML MDRD: ABNORMAL ML/MIN/{1.73_M2}
GLUCOSE BLD-MCNC: 124 MG/DL (ref 70–99)
HCT VFR BLD CALC: 40.9 % (ref 40.7–50.3)
HEMOGLOBIN: 12.6 G/DL (ref 13–17)
IMMATURE GRANULOCYTES: 0 %
LYMPHOCYTES # BLD: 17 % (ref 24–43)
MAGNESIUM: 1.8 MG/DL (ref 1.6–2.6)
MCH RBC QN AUTO: 31.5 PG (ref 25.2–33.5)
MCHC RBC AUTO-ENTMCNC: 30.8 G/DL (ref 28.4–34.8)
MCV RBC AUTO: 102.3 FL (ref 82.6–102.9)
MONOCYTES # BLD: 13 % (ref 3–12)
NRBC AUTOMATED: 0 PER 100 WBC
PARTIAL THROMBOPLASTIN TIME: 49.7 SEC (ref 20.5–30.5)
PARTIAL THROMBOPLASTIN TIME: 52.5 SEC (ref 20.5–30.5)
PARTIAL THROMBOPLASTIN TIME: >120 SEC (ref 20.5–30.5)
PDW BLD-RTO: 12.6 % (ref 11.8–14.4)
PHOSPHORUS: 3.3 MG/DL (ref 2.5–4.5)
PLATELET # BLD: ABNORMAL K/UL (ref 138–453)
PLATELET ESTIMATE: ABNORMAL
PLATELET, FLUORESCENCE: 127 K/UL (ref 138–453)
PLATELET, IMMATURE FRACTION: 5.9 % (ref 1.1–10.3)
PMV BLD AUTO: ABNORMAL FL (ref 8.1–13.5)
POTASSIUM SERPL-SCNC: 4 MMOL/L (ref 3.7–5.3)
PRO-BNP: 8087 PG/ML
RBC # BLD: 4 M/UL (ref 4.21–5.77)
RBC # BLD: ABNORMAL 10*6/UL
SEG NEUTROPHILS: 64 % (ref 36–65)
SEGMENTED NEUTROPHILS ABSOLUTE COUNT: 2.9 K/UL (ref 1.5–8.1)
SODIUM BLD-SCNC: 135 MMOL/L (ref 135–144)
WBC # BLD: 4.5 K/UL (ref 3.5–11.3)
WBC # BLD: ABNORMAL 10*3/UL

## 2020-12-26 PROCEDURE — 85730 THROMBOPLASTIN TIME PARTIAL: CPT

## 2020-12-26 PROCEDURE — 6360000002 HC RX W HCPCS: Performed by: INTERNAL MEDICINE

## 2020-12-26 PROCEDURE — 83735 ASSAY OF MAGNESIUM: CPT

## 2020-12-26 PROCEDURE — 2060000000 HC ICU INTERMEDIATE R&B

## 2020-12-26 PROCEDURE — 36415 COLL VENOUS BLD VENIPUNCTURE: CPT

## 2020-12-26 PROCEDURE — 99232 SBSQ HOSP IP/OBS MODERATE 35: CPT | Performed by: INTERNAL MEDICINE

## 2020-12-26 PROCEDURE — 6370000000 HC RX 637 (ALT 250 FOR IP): Performed by: INTERNAL MEDICINE

## 2020-12-26 PROCEDURE — 80069 RENAL FUNCTION PANEL: CPT

## 2020-12-26 PROCEDURE — 6370000000 HC RX 637 (ALT 250 FOR IP): Performed by: NURSE PRACTITIONER

## 2020-12-26 PROCEDURE — 85055 RETICULATED PLATELET ASSAY: CPT

## 2020-12-26 PROCEDURE — 97162 PT EVAL MOD COMPLEX 30 MIN: CPT

## 2020-12-26 PROCEDURE — 6360000002 HC RX W HCPCS: Performed by: STUDENT IN AN ORGANIZED HEALTH CARE EDUCATION/TRAINING PROGRAM

## 2020-12-26 PROCEDURE — 85025 COMPLETE CBC W/AUTO DIFF WBC: CPT

## 2020-12-26 PROCEDURE — 97116 GAIT TRAINING THERAPY: CPT

## 2020-12-26 PROCEDURE — 83880 ASSAY OF NATRIURETIC PEPTIDE: CPT

## 2020-12-26 PROCEDURE — 6360000002 HC RX W HCPCS: Performed by: NURSE PRACTITIONER

## 2020-12-26 RX ORDER — MAGNESIUM SULFATE IN WATER 40 MG/ML
2 INJECTION, SOLUTION INTRAVENOUS ONCE
Status: COMPLETED | OUTPATIENT
Start: 2020-12-26 | End: 2020-12-26

## 2020-12-26 RX ADMIN — METOPROLOL TARTRATE 75 MG: 50 TABLET, FILM COATED ORAL at 08:13

## 2020-12-26 RX ADMIN — FUROSEMIDE 60 MG: 10 INJECTION, SOLUTION INTRAMUSCULAR; INTRAVENOUS at 21:12

## 2020-12-26 RX ADMIN — MAGNESIUM SULFATE 2 G: 2 INJECTION INTRAVENOUS at 10:59

## 2020-12-26 RX ADMIN — SPIRONOLACTONE 25 MG: 25 TABLET ORAL at 08:13

## 2020-12-26 RX ADMIN — Medication 100 MG: at 08:14

## 2020-12-26 RX ADMIN — FUROSEMIDE 60 MG: 10 INJECTION, SOLUTION INTRAMUSCULAR; INTRAVENOUS at 08:14

## 2020-12-26 RX ADMIN — DOCUSATE SODIUM 100 MG: 100 CAPSULE, LIQUID FILLED ORAL at 21:12

## 2020-12-26 RX ADMIN — HEPARIN SODIUM AND DEXTROSE 10.46 UNITS/KG/HR: 10000; 5 INJECTION INTRAVENOUS at 05:15

## 2020-12-26 RX ADMIN — LISINOPRIL 5 MG: 10 TABLET ORAL at 08:14

## 2020-12-26 RX ADMIN — DIGOXIN 250 MCG: 250 TABLET ORAL at 08:14

## 2020-12-26 RX ADMIN — FUROSEMIDE 60 MG: 10 INJECTION, SOLUTION INTRAMUSCULAR; INTRAVENOUS at 14:14

## 2020-12-26 RX ADMIN — DOCUSATE SODIUM 100 MG: 100 CAPSULE, LIQUID FILLED ORAL at 08:13

## 2020-12-26 RX ADMIN — THERA TABS 1 TABLET: TAB at 08:14

## 2020-12-26 RX ADMIN — MAGNESIUM GLUCONATE 500 MG ORAL TABLET 400 MG: 500 TABLET ORAL at 08:13

## 2020-12-26 RX ADMIN — METOPROLOL TARTRATE 75 MG: 50 TABLET, FILM COATED ORAL at 21:12

## 2020-12-26 RX ADMIN — LEVOTHYROXINE SODIUM 50 MCG: 50 TABLET ORAL at 06:59

## 2020-12-26 ASSESSMENT — PAIN DESCRIPTION - PAIN TYPE: TYPE: ACUTE PAIN

## 2020-12-26 ASSESSMENT — PAIN DESCRIPTION - DESCRIPTORS: DESCRIPTORS: PRESSURE;DISCOMFORT

## 2020-12-26 ASSESSMENT — PAIN DESCRIPTION - ONSET: ONSET: ON-GOING

## 2020-12-26 ASSESSMENT — PAIN SCALES - GENERAL
PAINLEVEL_OUTOF10: 0
PAINLEVEL_OUTOF10: 0

## 2020-12-26 ASSESSMENT — PAIN - FUNCTIONAL ASSESSMENT: PAIN_FUNCTIONAL_ASSESSMENT: PREVENTS OR INTERFERES SOME ACTIVE ACTIVITIES AND ADLS

## 2020-12-26 ASSESSMENT — PAIN DESCRIPTION - LOCATION: LOCATION: SCROTUM

## 2020-12-26 NOTE — PROGRESS NOTES
[facility]  Office: [address], DO, Flavia Dunn DO, Richard Gregory, DO, [place], DO, Penelope Iverson MD, Angie Moreno MD, Cheo Narvaez MD, Vahe Russell MD, Maxwell Timmons MD, Devan Funk MD, Deo Landers MD, Eulalia Aranda MD, Troy Schulte MD, Tamika Dinh, DO, Khadar Aburto MD, Joaquin Tran MD, Terra Poole DO, Sherif Murphy MD,  Tyrell Rivas DO, Uche Hawkins MD, Saurabh Roberson MD, Ami Escobar, [facility], [facility], CNP, Hema Darlene, CNP, Gavin Bee, CNS, Duane Bottcher, CNP, Dae Bazan, CNP, Tyrone Gambino, CNP, Kirk Joseph, CNP, Archana Garza, CNP, Davey Bee PA-C, Dearl Chanelle, [facility], Matilda Banerjee, CNP, Jacqueline Antonio, CNP, Vladislav Swan, CNP, Skyler Adler, CNP, Edward Lazaro, [facility]   [address] [facility]    Progress Note    12/26/2020    5:14 PM    Name:   Wendel Ahumada  MRN:     [id number]     Acct:      [de-identified]   Room:   [facility]/0401-01   Day:  5  Admit Date:  12/21/2020  2:39 PM    PCP:   No primary care provider on file. Code Status:  Full Code    Subjective:     C/C:   Chief Complaint   Patient presents with    Chest Pain     new onset a-fib with rvr     Interval History Status: not changed. Patient seen and examined at bedside. Edema is improving. Denies any chest pain, SOB, difficulty breathing, nausea, vomiting and diarrhea. SCR is improving. HR much improved. Brief History: This is a 59-year-old male who presented with diffuse anasarca found to be newly diagnosed heart failure with reduced ejection fraction. EF 35-40% on echocardiogram from 12/22/2020. Patient started on IV diuresis with good response. Currently monitoring creatinine, electrolytes, I's/O's. Patient also in atrial fibrillation, he was started on metoprolol and digoxin.   Cardizem was held due to decreased EF and he was started on Lopressor which was titrated to 75 mg twice daily, lb 3.9 oz (143.9 kg)   SpO2 96%   BMI 44.25 kg/m²   Temp (24hrs), Av.9 °F (36.6 °C), Min:97.5 °F (36.4 °C), Max:98.2 °F (36.8 °C)    No results for input(s): POCGLU in the last 72 hours. I/O (24Hr): Intake/Output Summary (Last 24 hours) at 2020 1714  Last data filed at 2020 1645  Gross per 24 hour   Intake 3686 ml   Output 2681 ml   Net 1005 ml       Labs:  Hematology:  Recent Labs     20   WBC 4.7 4.6 4.5   RBC 4.19* 4.03* 4.00*   HGB 13.1 12.8* 12.6*   HCT 42.3 40.7 40.9   .0 101.0 102.3   MCH 31.3 31.8 31.5   MCHC 31.0 31.4 30.8   RDW 12.6 12.5 12.6   * 135* See Reflexed IPF Result   MPV 11.4 11.2 NOT REPORTED     Chemistry:  Recent Labs     20     --  136 135   K 3.6*  --  3.4* 4.0   CL 98  --  97* 95*   CO2 30  --  28 27   GLUCOSE 111*  --  122* 124*   BUN 16  --  16 14   CREATININE 1.19  --  1.03 1.00   MG 1.5*  --  1.9 1.8   ANIONGAP 11  --  11 13   LABGLOM >60  --  >60 >60   GFRAA >60  --  >60 >60   CALCIUM 8.7  --  8.8 8.8   PHOS 4.4  --  3.4 3.3   PROBNP 7,813*  --   --  8,087*   TROPHS 70* 72* 74*  --      Recent Labs     20   LABALBU 3.4* 3.2* 3.3*     ABG:No results found for: POCPH, PHART, PH, POCPCO2, EHX5UXZ, PCO2, POCPO2, PO2ART, PO2, POCHCO3, LUR1DQK, HCO3, NBEA, PBEA, BEART, BE, THGBART, THB, UHG3HTC, YNFC8TNI, Q4EAWJAE, O2SAT, FIO2  Lab Results   Component Value Date/Time    SPECIAL NOT REPORTED 2020 09:29 AM     Lab Results   Component Value Date/Time    CULTURE NO GROWTH 2 DAYS 2020 09:29 AM       Radiology:  Eryn Harriet Chest (2 Vw)    Result Date: 2020  Right pleural effusion. Question of whether some of this is loculated. Question of whether there could be a central obstructing process as there is prominence of the right perihilar structures without definite pulmonary congestion. Large body habitus. Xr Abdomen (kub) (single Ap View)    Result Date: 12/21/2020  Nonspecific nonobstructive bowel gas pattern. Xr Chest Portable    Result Date: 12/21/2020  Cardiomegaly and pulmonary vascular congestion. Moderate to large right pleural effusion and right lower lobe airspace disease, atelectasis versus pneumonia. Us Retroperitoneal Complete    Result Date: 12/23/2020  Unremarkable ultrasound of the kidneys and urinary bladder. Moderate ascites. Physical Examination:        General appearance:  alert, cooperative he does he appears more comfortable  Mental Status:  oriented to person, place and time and normal affect  Lungs: Coarse auscultation in upper and lower lobes bilaterally with diminished breath sounds in the right lung field that has improved since thoracentesis  Heart:  Irregular heart rate, Tachycardic, no murmur  Abdomen:  Distended, improving compared to 12/22, bowel sounds present. No hepatomegaly  Extremities:  +2 pitting edema, redness, tenderness in the calves. +scrotal edema  Skin:  no gross lesions, rashes, induration    Assessment:        Hospital Problems           Last Modified POA    * (Principal) Acute on chronic systolic congestive heart failure ([address] 12/22/2020 Yes    New onset of congestive heart failure ([address] 12/22/2020 Yes    New onset a-fib ([address] 12/21/2020 Yes    Other specified hypothyroidism 12/21/2020 Yes    Elevated troponin 12/21/2020 Yes    Elevated serum creatinine 12/21/2020 Yes    Class 3 severe obesity due to excess calories with body mass index (BMI) of 40.0 to 44.9 in adult [facility]) 12/21/2020 Yes    Edema 12/21/2020 Yes    Constipation 12/21/2020 Yes    Anasarca 12/21/2020 Yes    TSH elevation 12/21/2020 Yes    Hyperkalemia 12/21/2020 Yes    Atrial fibrillation, rapid ([address] 12/21/2020 Yes    Acute hypokalemia 12/25/2020 Yes          Plan:        1. Acute exacerbation of newly diagnosed HFrEF: Echo 12/21 EF: 35-40%. Continue Lasix 60 mg TID, Strict I/O.  Net negative 13L. Keep potassium>4, magnesium >2. 1200 cc fluid restriction. Monitor Scr. 2 gram salt restriction. will need cardiac catheterization after acute process resolves. Suspect nonischemic due to ETOH use. 2. Atrial fibrillation with RVR: Rate better controlled. Lopressor 75 mg BID, Digoxin 250 ug, amiodarone (added 12/25). Continue heparin gtt for now, Check cost of Eliquis with plans to transition over to PO medication once stable  3. Acute hypokalemia: Replace electrolytes today, keep potassium above 4  4. NSTEMI type II: 2/2 atrial fibrillation and CHF exacerbation. Will need cardiac catheterization prior to discharge. 5. Subclinical Hypothyroidism: TSH: 12.6. Will start low dose synthroid 50 ug. Will need continued outpatient follow up   6. Large pleural effusion: Likely 2/2 above, diagnostic/theraputic thoracentesis 12/24 1.2 L removed, Transudate by lights criteria   7. ETOH abuse: Without symptoms of withdrawal. CIWA Thiamine, folate supplementation. Continue daily MVI. 8. Constipation: continue bowel regimen. 9. Elevated Bilirubin: improving. 10. Obesity with BMI of 44.6  11. Labs, imaging reviewed.       Gordon Azul DO  12/26/2020  5:14 PM

## 2020-12-26 NOTE — PROGRESS NOTES
Physical Therapy    Facility/Department: [facility] 4A STEPDOWN  Initial Assessment    NAME: Sabra Villagomez  : 1965  MRN: [id number]    Date of Service: 2020    Discharge Recommendations:  Patient would benefit from continued therapy after discharge        Assessment   Body structures, Functions, Activity limitations: Decreased functional mobility ; Decreased endurance; Increased pain  Assessment: Pt present with moderate swelling B LE and scrotal area, HR sightly high, affecting hoisoverall mobility. Will benefit from continued therapy to facilitate improved fucntion for safe return home. Treatment Diagnosis: Impaired fucntion. Specific instructions for Next Treatment: Monitor HR, sao2 with activity  Prognosis: Good  Decision Making: Medium Complexity  REQUIRES PT FOLLOW UP: Yes  Activity Tolerance  Activity Tolerance: Patient limited by fatigue;Patient limited by endurance; Other(Swelling B LE/scrotal area.)       Patient Diagnosis(es): The primary encounter diagnosis was Atrial fibrillation, unspecified type ([facility] A diagnosis of Congestive heart failure, unspecified HF chronicity, unspecified heart failure type [facility]) was also pertinent to this visit. has no past medical history on file. has a past surgical history that includes Elbow surgery. Restrictions  Restrictions/Precautions  Restrictions/Precautions: Cardiac, Fall Risk  Position Activity Restriction  Other position/activity restrictions: scrotal and penial edema  Vision/Hearing  Vision: Within Functional Limits  Hearing: Within functional limits     Subjective  General  Patient assessed for rehabilitation services?: Yes  Additional Pertinent Hx: This is a 54-year-old male who presented with diffuse anasarca found to be newly diagnosed heart failure with reduced ejection fraction. EF 35-40% on echocardiogram from 2020. Patient started on IV diuresis with good response. Currently monitoring creatinine, electrolytes, I's/O's.   Patient also in atrial fibrillation. Cardiology following, per andre notes, Cath once breathing improved, more diuresed. Referral Date : 12/22/20  Diagnosis: CHF  Follows Commands: Within Functional Limits  Pain Screening  Patient Currently in Pain: Yes  Pain Assessment  Pain Assessment: 0-10  Pain Level: 7  Pain Type: Acute pain  Pain Location: Scrotum  Pain Descriptors: Pressure; Discomfort  Pain Frequency: Continuous  Pain Onset: On-going  Clinical Progression: Not changed  Functional Pain Assessment: Prevents or interferes some active activities and ADLs  Vital Signs  Patient Currently in Pain: Yes       Orientation  Orientation  Overall Orientation Status: Within Functional Limits  Social/Functional History  Social/Functional History  Lives With: Alone  Type of Home: House  Home Layout: One level  Home Access: Stairs to enter without rails  Entrance Stairs - Number of Steps: 1  Bathroom Shower/Tub: Tub/Shower unit  Bathroom Toilet: Standard  Bathroom Equipment: Grab bars in shower, Hand-held shower  Bathroom Accessibility: Accessible  Home Equipment: (No DME)  ADL Assistance: Independent  Homemaking Assistance: Independent  Homemaking Responsibilities: Yes  Ambulation Assistance: Independent  Transfer Assistance: Independent  Active : Yes  Mode of Transportation: [employer]  Occupation: Full time employment  Type of occupation: Drives cars (jeep)  Additional Comments: Pt does all home making chores on his own. Cognition        Objective          AROM RLE (degrees)  RLE AROM: WFL  RLE General AROM: Moderate swelling LE- from toes up to thigh/waist and abdomin.   AROM LLE (degrees)  LLE AROM : WFL  LLE General AROM: Moderate swelling LE- from toes up to thigh/waist/abdomin   AROM RUE (degrees)  RUE AROM : WFL  AROM LUE (degrees)  LUE AROM : WFL  Strength RLE  Comment: Atleast 3/5, defreed MMT due to swelling/pt tender to touch  Strength LLE  Comment: Atleast 3/5, deferred MMT due to tender to touch due to swelling Sensation  Overall Sensation Status: WFL  Bed mobility  Rolling to Left: Stand by assistance  Rolling to Right: Stand by assistance  Supine to Sit: Stand by assistance  Sit to Supine: Stand by assistance  Scooting: Stand by assistance  Comment: Heavy reliance for use of UE/bned rail due to B LE/abdominal swelling, including scrotal swelling- uncomfortable during bed mobility. Transfers  Sit to Stand: Stand by assistance  Stand to sit: Stand by assistance  Ambulation  Ambulation?: Yes  Ambulation 1  Surface: level tile  Device: No Device  Other Apparatus: (IV pole)  Assistance: Stand by assistance  Quality of Gait: wide FE due to scrotal swelling, HR in high 100's, sao2 in low 90's with activity  Gait Deviations: Slow Tg;Decreased step length;Decreased step height  Distance: 35 ft  Comments: Pt reports uncomfortable walking due to scrotal/LE edema.      Balance  Posture: Good  Sitting - Static: Good  Sitting - Dynamic: Fair  Standing - Static: Good  Standing - Dynamic: Fair;+        Plan   Plan  Times per week: 4 to 5 x/week  Specific instructions for Next Treatment: Monitor HR, sao2 with activity  Current Treatment Recommendations: Strengthening, Balance Training, Functional Mobility Training, Transfer Training, Endurance Training, Gait Training, Safety Education & Training, Patient/Caregiver Education & Training  Safety Devices  Type of devices: Left in bed, Call light within reach    G-Code       OutComes Score                                                  AM-PAC Score  AM-PAC Inpatient Mobility Raw Score : 19 (12/26/20 1705)  AM-PAC Inpatient T-Scale Score : 45.44 (12/26/20 1705)  Mobility Inpatient CMS 0-100% Score: 41.77 (12/26/20 1705)  Mobility Inpatient CMS G-Code Modifier : CK (12/26/20 1705)          Goals  Short term goals  Time Frame for Short term goals: 7 to 8 visits  Short term goal 1: Pt able to perform supine<>sit without use of bed rail  Short term goal 2: pt to demonstrate independent transfers  Short term goal 3: Pt able to ambulate without device, SBA dist of 200 ft, with vitals remaining stable. Short term goal 4: Pt to tolerate activity for 15 to 30 minutes to improve strength and endurance for self care. Patient Goals   Patient goals : Decreased swelling in private are and legs.        Therapy Time   Individual Concurrent Group Co-treatment   Time In 1610         Time Out 1635         Minutes 25         Timed Code Treatment Minutes: [address] [facility], [place]

## 2020-12-26 NOTE — PROGRESS NOTES
[address] [facility]   Progress Note                   Date:   12/26/2020  Patient name: Wild Cordero  Date of admission:  12/21/2020  2:39 PM  MRN:   [id number]  YOB: 1965  PCP: No primary care provider on file. Reason for Admission: New onset of congestive heart failure ([facility] [I50.9]    Subjective:       Clinical Changes / Abnormalities: Pt seen and examined in the room. Pt denies any CP or SOB presently. States he has been up walking in room and \"breathing pretty good. \" Admits to continued scrotal and penial edema. Improved LE edema. Remains Afib with RVR with rate 90-100s      Medications:   Scheduled Meds:   spironolactone  25 mg Oral Daily    magnesium oxide  400 mg Oral Daily    metoprolol tartrate  75 mg Oral BID    digoxin  250 mcg Oral Daily    furosemide  60 mg Intravenous TID    thiamine  100 mg Oral Daily    multivitamin  1 tablet Oral Daily    docusate sodium  100 mg Oral BID    lisinopril  5 mg Oral Daily    levothyroxine  50 mcg Oral Daily     Continuous Infusions:   amiodarone 0.5 mg/min (12/25/20 2324)    heparin (PORCINE) Infusion 10.46 Units/kg/hr (12/26/20 0515)     CBC:   Recent Labs     12/24/20  0509 12/25/20  0204 12/26/20  0325   WBC 4.7 4.6 4.5   HGB 13.1 12.8* 12.6*   * 135* See Reflexed IPF Result     BMP:    Recent Labs     12/24/20  0509 12/25/20  0204 12/26/20  0325    136 135   K 3.6* 3.4* 4.0   CL 98 97* 95*   CO2 30 28 27   BUN 16 16 14   CREATININE 1.19 1.03 1.00   GLUCOSE 111* 122* 124*     Hepatic:   No results for input(s): AST, ALT, ALB, BILITOT, ALKPHOS in the last 72 hours. Troponin:   Recent Labs     12/24/20  0509 12/24/20  1942 12/25/20  0204   TROPHS 70* 72* 74*     BNP: No results for input(s): BNP in the last 72 hours. Lipids:   No results for input(s): CHOL, HDL in the last 72 hours.     Invalid input(s): LDLCALCU  INR:   Recent Labs     12/23/20  1648   INR 1.3     ECHO 12/22/20  Summary  Left ventricle is normal in size with Moderately reduced systolic function,  visually estimated LVEF 35-40%. Evidence of diastolic dysfunction. Left atrium is moderately dilated. Right atrial enlargement. Aortic sclerosis with mild focal calcification. Mild mitral regurgitation. Mild tricuspid regurgitation. Estimated right ventricular systolic pressure  is 29 mmHg. Objective:   Vitals: BP (!) 117/94   Pulse 112   Temp 98 °F (36.7 °C) (Oral)   Resp 18   Ht 5' 11\" (1.803 m)   Wt (!) 317 lb 3.9 oz (143.9 kg)   SpO2 (!) 87%   BMI 44.25 kg/m²   General appearance: alert and cooperative with exam  HEENT: Head: Normocephalic, no lesions, without obvious abnormality. Neck: no JVD, trachea midline, no adenopathy  Lungs: Diminished  to auscultation throughout. NO rales. On RA  Heart: irregular rate and rhythm, s1/s2 auscultated, no murmurs, afib   Abdomen: distended. Firm   Extremities: +2 bilateral  Edema, right > left - much improving. Continued significant penial/scrotal edema  Neurologic: not done        Assessment / Acute Cardiac Problems:   1. Generalized anasarca   2. New onset atrial fibrillation with RVR CHADSVASC 0  3. Pulmonary edema with right sided pleural effusion  4. New onset congestive heart failure, acute systolic  5. Elevated troponin - likely demand ischemia from tachycardia & CHF  6. Newly diagnosed Hypothyroidism  7. Acute kidney injury - likely secondary to cardiorenal type I  8. Hyperkalemia  9.  Morbid obesity - BMI 44.6    Patient Active Problem List:     New onset of congestive heart failure (HCC)     New onset a-fib ([address]     Other specified hypothyroidism     Elevated troponin     Elevated serum creatinine     Class 3 severe obesity due to excess calories with body mass index (BMI) of 40.0 to 44.9 in [facility])     Edema     Constipation     Anasarca     TSH elevation     Hyperkalemia     Atrial fibrillation, rapid ([address]     Atrial fibrillation (HCC)     Acute on chronic systolic congestive heart failure (HCC)    [id number] Score for Atrial Fibrillation Stroke Risk   Risk   Factors  Component Value   C CHF Yes 1   H HTN No 0   A2 Age >= 75 No,  (54 y.o.) 0   D DM No 0   S2 Prior Stroke/TIA No 0   V Vascular Disease No 0   A Age 74-69 No,  (54 y.o.) 0   Sc Sex male 0    [id number]  Score  1   Score last updated 12/23/20 99:22 AM EST    Click here for a link to the UpToDate guideline \"Atrial Fibrillation: Anticoagulation therapy to prevent embolization    Disclaimer: Risk Score calculation is dependent on accuracy of patient problem list and past encounter diagnosis. Plan of Treatment:   1. Afib. Started on Amio gtt yesterady. Continue FWB Lovenox BID. Continue BB and Dig . No CCB due to cardiomyopathy. Will likely need NEELIMA/CV once diuresed. 2. Elevated trops, and new cardiomyopathy. Will need cardiac cath once acute issues resolve. Continue SQ Lovenox & BB.   3. Replace K and Mg. Keep K > 4 and Mg > 2  4. Cath once breathing improved, more diuresed. Add Aldactone. Diuresing well but slowly.  Continue IV Lasix       Electronically signed by LAUREN Lane CNP on 12/26/2020 at 10:09 AM  [address]  [phone]

## 2020-12-27 LAB
ABSOLUTE EOS #: 0.25 K/UL (ref 0–0.44)
ABSOLUTE IMMATURE GRANULOCYTE: <0.03 K/UL (ref 0–0.3)
ABSOLUTE LYMPH #: 0.95 K/UL (ref 1.1–3.7)
ABSOLUTE MONO #: 0.59 K/UL (ref 0.1–1.2)
ALBUMIN SERPL-MCNC: 3.1 G/DL (ref 3.5–5.2)
ANION GAP SERPL CALCULATED.3IONS-SCNC: 12 MMOL/L (ref 9–17)
BASOPHILS # BLD: 1 % (ref 0–2)
BASOPHILS ABSOLUTE: 0.05 K/UL (ref 0–0.2)
BUN BLDV-MCNC: 16 MG/DL (ref 6–20)
BUN/CREAT BLD: ABNORMAL (ref 9–20)
CALCIUM SERPL-MCNC: 8.9 MG/DL (ref 8.6–10.4)
CHLORIDE BLD-SCNC: 95 MMOL/L (ref 98–107)
CO2: 30 MMOL/L (ref 20–31)
CREAT SERPL-MCNC: 1.13 MG/DL (ref 0.7–1.2)
DIFFERENTIAL TYPE: ABNORMAL
EOSINOPHILS RELATIVE PERCENT: 5 % (ref 1–4)
GFR AFRICAN AMERICAN: >60 ML/MIN
GFR NON-AFRICAN AMERICAN: >60 ML/MIN
GFR SERPL CREATININE-BSD FRML MDRD: ABNORMAL ML/MIN/{1.73_M2}
GFR SERPL CREATININE-BSD FRML MDRD: ABNORMAL ML/MIN/{1.73_M2}
GLUCOSE BLD-MCNC: 108 MG/DL (ref 70–99)
HCT VFR BLD CALC: 41.7 % (ref 40.7–50.3)
HEMOGLOBIN: 12.7 G/DL (ref 13–17)
IMMATURE GRANULOCYTES: 0 %
LYMPHOCYTES # BLD: 19 % (ref 24–43)
MAGNESIUM: 1.9 MG/DL (ref 1.6–2.6)
MCH RBC QN AUTO: 30.8 PG (ref 25.2–33.5)
MCHC RBC AUTO-ENTMCNC: 30.5 G/DL (ref 28.4–34.8)
MCV RBC AUTO: 101 FL (ref 82.6–102.9)
MONOCYTES # BLD: 12 % (ref 3–12)
NRBC AUTOMATED: 0 PER 100 WBC
PARTIAL THROMBOPLASTIN TIME: 106.4 SEC (ref 20.5–30.5)
PARTIAL THROMBOPLASTIN TIME: 73.7 SEC (ref 20.5–30.5)
PDW BLD-RTO: 12.7 % (ref 11.8–14.4)
PHOSPHORUS: 3.5 MG/DL (ref 2.5–4.5)
PLATELET # BLD: ABNORMAL K/UL (ref 138–453)
PLATELET ESTIMATE: ABNORMAL
PLATELET, FLUORESCENCE: 128 K/UL (ref 138–453)
PLATELET, IMMATURE FRACTION: 7.4 % (ref 1.1–10.3)
PMV BLD AUTO: ABNORMAL FL (ref 8.1–13.5)
POTASSIUM SERPL-SCNC: 3.7 MMOL/L (ref 3.7–5.3)
RBC # BLD: 4.13 M/UL (ref 4.21–5.77)
RBC # BLD: ABNORMAL 10*6/UL
SEG NEUTROPHILS: 62 % (ref 36–65)
SEGMENTED NEUTROPHILS ABSOLUTE COUNT: 3.03 K/UL (ref 1.5–8.1)
SODIUM BLD-SCNC: 137 MMOL/L (ref 135–144)
WBC # BLD: 4.9 K/UL (ref 3.5–11.3)
WBC # BLD: ABNORMAL 10*3/UL

## 2020-12-27 PROCEDURE — 6360000002 HC RX W HCPCS: Performed by: INTERNAL MEDICINE

## 2020-12-27 PROCEDURE — 6370000000 HC RX 637 (ALT 250 FOR IP): Performed by: NURSE PRACTITIONER

## 2020-12-27 PROCEDURE — 85730 THROMBOPLASTIN TIME PARTIAL: CPT

## 2020-12-27 PROCEDURE — 6370000000 HC RX 637 (ALT 250 FOR IP): Performed by: INTERNAL MEDICINE

## 2020-12-27 PROCEDURE — 2060000000 HC ICU INTERMEDIATE R&B

## 2020-12-27 PROCEDURE — 36415 COLL VENOUS BLD VENIPUNCTURE: CPT

## 2020-12-27 PROCEDURE — 6360000002 HC RX W HCPCS: Performed by: NURSE PRACTITIONER

## 2020-12-27 PROCEDURE — 2580000003 HC RX 258: Performed by: NURSE PRACTITIONER

## 2020-12-27 PROCEDURE — 99232 SBSQ HOSP IP/OBS MODERATE 35: CPT | Performed by: INTERNAL MEDICINE

## 2020-12-27 RX ORDER — FUROSEMIDE 10 MG/ML
60 INJECTION INTRAMUSCULAR; INTRAVENOUS 2 TIMES DAILY
Status: DISCONTINUED | OUTPATIENT
Start: 2020-12-27 | End: 2020-12-29

## 2020-12-27 RX ORDER — AMIODARONE HYDROCHLORIDE 200 MG/1
200 TABLET ORAL DAILY
Status: DISCONTINUED | OUTPATIENT
Start: 2020-12-27 | End: 2020-12-30 | Stop reason: HOSPADM

## 2020-12-27 RX ADMIN — ENOXAPARIN SODIUM 220 MG: 100 INJECTION SUBCUTANEOUS at 10:08

## 2020-12-27 RX ADMIN — DOCUSATE SODIUM 100 MG: 100 CAPSULE, LIQUID FILLED ORAL at 20:54

## 2020-12-27 RX ADMIN — METOPROLOL TARTRATE 75 MG: 50 TABLET, FILM COATED ORAL at 08:17

## 2020-12-27 RX ADMIN — DIGOXIN 250 MCG: 250 TABLET ORAL at 08:15

## 2020-12-27 RX ADMIN — FUROSEMIDE 60 MG: 10 INJECTION, SOLUTION INTRAMUSCULAR; INTRAVENOUS at 08:15

## 2020-12-27 RX ADMIN — SPIRONOLACTONE 25 MG: 25 TABLET ORAL at 08:15

## 2020-12-27 RX ADMIN — METOPROLOL TARTRATE 75 MG: 50 TABLET, FILM COATED ORAL at 20:53

## 2020-12-27 RX ADMIN — AMIODARONE HYDROCHLORIDE 0.5 MG/MIN: 50 INJECTION, SOLUTION INTRAVENOUS at 05:35

## 2020-12-27 RX ADMIN — Medication 100 MG: at 08:15

## 2020-12-27 RX ADMIN — AMIODARONE HYDROCHLORIDE 200 MG: 200 TABLET ORAL at 11:43

## 2020-12-27 RX ADMIN — MAGNESIUM GLUCONATE 500 MG ORAL TABLET 400 MG: 500 TABLET ORAL at 08:15

## 2020-12-27 RX ADMIN — THERA TABS 1 TABLET: TAB at 08:15

## 2020-12-27 RX ADMIN — LEVOTHYROXINE SODIUM 50 MCG: 50 TABLET ORAL at 06:54

## 2020-12-27 RX ADMIN — DOCUSATE SODIUM 100 MG: 100 CAPSULE, LIQUID FILLED ORAL at 08:15

## 2020-12-27 RX ADMIN — FUROSEMIDE 60 MG: 10 INJECTION, SOLUTION INTRAMUSCULAR; INTRAVENOUS at 17:17

## 2020-12-27 RX ADMIN — LISINOPRIL 5 MG: 10 TABLET ORAL at 08:15

## 2020-12-27 NOTE — PROGRESS NOTES
[place] [facility]   Progress Note                   Date:   12/27/2020  Patient name: Wild Cordero  Date of admission:  12/21/2020  2:39 PM  MRN:   [id number]  YOB: 1965  PCP: No primary care provider on file. Reason for Admission: New onset of congestive heart failure ([facility] [I50.9]    Subjective:       Clinical Changes / Abnormalities: Pt seen and examined in the room after discussion with RN. Pt denies any CP or SOB presently. Improved LE edema. Remains Afib with rates in the 90s. On amiodarone drip. I/O -1050 yesterday/ -68055 Since admission     Medications:   Scheduled Meds:   furosemide  60 mg Intravenous BID    enoxaparin  1.5 mg/kg Subcutaneous Daily    spironolactone  25 mg Oral Daily    magnesium oxide  400 mg Oral Daily    metoprolol tartrate  75 mg Oral BID    digoxin  250 mcg Oral Daily    thiamine  100 mg Oral Daily    multivitamin  1 tablet Oral Daily    docusate sodium  100 mg Oral BID    lisinopril  5 mg Oral Daily    levothyroxine  50 mcg Oral Daily     Continuous Infusions:   amiodarone 0.5 mg/min (12/27/20 0535)     CBC:   Recent Labs     12/25/20  0204 12/26/20  0325 12/26/20  2347   WBC 4.6 4.5 4.9   HGB 12.8* 12.6* 12.7*   * See Reflexed IPF Result See Reflexed IPF Result     BMP:    Recent Labs     12/25/20  0204 12/26/20  0325 12/26/20  2347    135 137   K 3.4* 4.0 3.7   CL 97* 95* 95*   CO2 28 27 30   BUN 16 14 16   CREATININE 1.03 1.00 1.13   GLUCOSE 122* 124* 108*     Hepatic:   No results for input(s): AST, ALT, ALB, BILITOT, ALKPHOS in the last 72 hours. Troponin:   Recent Labs     12/24/20  1942 12/25/20  0204   TROPHS 72* 74*     BNP: No results for input(s): BNP in the last 72 hours. Lipids:   No results for input(s): CHOL, HDL in the last 72 hours. Invalid input(s): LDLCALCU  INR:   No results for input(s): INR in the last 72 hours.   ECHO 12/22/20  Summary  Left ventricle is normal in size with Moderately reduced systolic function,  visually estimated LVEF 35-40%. Evidence of diastolic dysfunction. Left atrium is moderately dilated. Right atrial enlargement. Aortic sclerosis with mild focal calcification. Mild mitral regurgitation. Mild tricuspid regurgitation. Estimated right ventricular systolic pressure  is 29 mmHg. Objective:   Vitals: /67   Pulse 100   Temp 97.6 °F (36.4 °C) (Oral)   Resp 20   Ht 5' 11\" (1.803 m)   Wt (!) 317 lb 3.9 oz (143.9 kg)   SpO2 93%   BMI 44.25 kg/m²   General appearance: alert and cooperative with exam  HEENT: Head: Normocephalic, no lesions, without obvious abnormality. Neck: no JVD, trachea midline, no adenopathy  Lungs: Diminished  to auscultation throughout. NO rales. On RA  Heart: irregular rate and rhythm, s1/s2 auscultated, no murmurs, afib 90s  Abdomen: distended. Firm   Extremities: +1 bilateral  Edema, right > left - much improving. Continued significant penial/scrotal edema  Neurologic: not done        Assessment / Acute Cardiac Problems:   1. Generalized anasarca   2. New onset atrial fibrillation with RVR CHADSVASC 0  3. Pulmonary edema with right sided pleural effusion  4. New onset congestive heart failure, acute systolic  5. Elevated troponin - likely demand ischemia from tachycardia & CHF  6. Newly diagnosed Hypothyroidism  7. Acute kidney injury - likely secondary to cardiorenal type I  8. Hyperkalemia  9.  Morbid obesity - BMI 44.6    Patient Active Problem List:     New onset of congestive heart failure (HCC)     New onset a-fib ([address]     Other specified hypothyroidism     Elevated troponin     Elevated serum creatinine     Class 3 severe obesity due to excess calories with body mass index (BMI) of 40.0 to 44.9 in adult [facility])     Edema     Constipation     Anasarca     TSH elevation     Hyperkalemia     Atrial fibrillation, rapid (HCC)     Atrial fibrillation (HCC)     Acute on chronic systolic congestive heart failure (HCC)    [id number] Score for Atrial Fibrillation Stroke Risk   Risk   Factors  Component Value   C CHF Yes 1   H HTN No 0   A2 Age >= 75 No,  (54 y.o.) 0   D DM No 0   S2 Prior Stroke/TIA No 0   V Vascular Disease No 0   A Age 74-69 No,  (54 y.o.) 0   Sc Sex male 0    [id number]  Score  1   Score last updated 12/23/20 37:66 AM EST    Click here for a link to the UpToDate guideline \"Atrial Fibrillation: Anticoagulation therapy to prevent embolization    Disclaimer: Risk Score calculation is dependent on accuracy of patient problem list and past encounter diagnosis. Plan of Treatment:   1. Afib. Started on Amio gtt yesterday. Continue FWB Lovenox BID. Continue BB and Dig . No CCB due to cardiomyopathy. Will switch IV amiodarone to PO. Will likely need NEELIMA/CV. 2. Elevated trops, and new cardiomyopathy. Will need cardiac cath once acute issues resolve. Continue SQ Lovenox & BB  3. Keep K > 4 and Mg > 2  Awaiting AM labs. 4. Cath once breathing improved. Diuresing well but slowly.  Continue IV Lasix, aldactone      Electronically signed by LAUREN Moreno NP on 12/27/2020 at 10:49 AM  [address]  [phone]

## 2020-12-27 NOTE — PROGRESS NOTES
[facility]  Office: [address], DO, Loy Stanley, DO, Mami Black, DO, [facility], DO, Castillo Lange MD, Johanna Weston MD, Dany Morelos MD, Quinton Bowles MD, Chico Rhodes MD, Sachin Cabral MD, Donna Barba MD, Patricio Bumpers, MD, Troy Sosa MD, Socorro Mcclellan DO, Romy Angeles MD, Lionel Logan MD, Drew Kwon DO, David Levy MD,  [facility], DO, Kaveh Aceves MD, Guadalupe Lezama MD, Suyapa Vivar [facility], [facility], CNP, Lorenzo Craig, CNP, Denny Fu, CNS, Reyes Silvestre, CNP, Christy Monge, CNP, Lizett Covington, CNP, Inna Tobias, CNP, Brenda Rodriguez, CNP, Stacie Lee PA-C, Marcello Kayser, [facility], Canelo Kraft CNP, Denver Macadam, [facility], Supriya Gutierrez, [facility], Tuan Mancini, CNP, Anshul Hdez, [facility]   [address] [facility]    Progress Note    12/27/2020    5:19 PM    Name:   Akash Evans  MRN:     [id number]     Acct:      [de-identified]   Room:   [address] [place] [address] Day:  6  Admit Date:  12/21/2020  2:39 PM    PCP:   No primary care provider on file. Code Status:  Full Code    Subjective:     C/C:   Chief Complaint   Patient presents with    Chest Pain     new onset a-fib with rvr     Interval History Status: not changed. Patient seen and examined at bedside. Feels significantly better. Breathing is improved, scrotal swelling also improved. No complaints. Brief History: This is a 17-year-old male who presented with diffuse anasarca found to be newly diagnosed heart failure with reduced ejection fraction. EF 35-40% on echocardiogram from 12/22/2020. Patient started on IV diuresis with good response. Currently monitoring creatinine, electrolytes, I's/O's. Patient also in atrial fibrillation, he was started on metoprolol and digoxin. Cardizem was held due to decreased EF and he was started on Lopressor which was titrated to 75 mg twice daily, digoxin, and amiodarone.     Review of Systems:     Constitutional:  negative for chills, fevers, sweats  Respiratory: Admits to some dyspnea that has improved since thoracentesis cardiovascular:  negative for chest pain, chest pressure/discomfort, admits to diffuse lower extremity edema that is improving, scrotal edema, abdominal distention. Gastrointestinal:  negative for abdominal pain, constipation, diarrhea, nausea, vomiting. Abdomen is distended per patient  Neurological:  negative for dizziness, headache    Medications: Allergies: Allergies   Allergen Reactions    Seasonal        Current Meds:   Scheduled Meds:    furosemide  60 mg Intravenous BID    enoxaparin  1.5 mg/kg Subcutaneous Daily    amiodarone  200 mg Oral Daily    spironolactone  25 mg Oral Daily    magnesium oxide  400 mg Oral Daily    metoprolol tartrate  75 mg Oral BID    digoxin  250 mcg Oral Daily    thiamine  100 mg Oral Daily    multivitamin  1 tablet Oral Daily    docusate sodium  100 mg Oral BID    lisinopril  5 mg Oral Daily    levothyroxine  50 mcg Oral Daily     Continuous Infusions:     PRN Meds: potassium chloride **OR** potassium alternative oral replacement **OR** potassium chloride, bisacodyl, promethazine **OR** ondansetron, polyethylene glycol, acetaminophen **OR** acetaminophen    Data:     Past Medical History:   has no past medical history on file. Social History:   reports that he has never smoked. His smokeless tobacco use includes chew. He reports current alcohol use of about 30.0 standard drinks of alcohol per week. He reports that he does not use drugs.      Family History:   Family History   Problem Relation Age of Onset    Atrial Fibrillation Mother     Stroke Father        Vitals:  /75   Pulse 96   Temp 97.8 °F (36.6 °C) (Oral)   Resp 20   Ht 5' 11\" (1.803 m)   Wt (!) 317 lb 3.9 oz (143.9 kg)   SpO2 96%   BMI 44.25 kg/m²   Temp (24hrs), Av.8 °F (36.6 °C), Min:97.2 °F (36.2 °C), Max:98.9 °F (37.2 °C)    No results for input(s): POCGLU in the last 72 hours. I/O (24Hr): Intake/Output Summary (Last 24 hours) at 12/27/2020 1719  Last data filed at 12/27/2020 1011  Gross per 24 hour   Intake --   Output 3450 ml   Net -3450 ml       Labs:  Hematology:  Recent Labs     12/25/20 0204 12/26/20 0325 12/26/20 2347   WBC 4.6 4.5 4.9   RBC 4.03* 4.00* 4.13*   HGB 12.8* 12.6* 12.7*   HCT 40.7 40.9 41.7   .0 102.3 101.0   MCH 31.8 31.5 30.8   MCHC 31.4 30.8 30.5   RDW 12.5 12.6 12.7   * See Reflexed IPF Result See Reflexed IPF Result   MPV 11.2 NOT REPORTED NOT REPORTED     Chemistry:  Recent Labs     12/24/20 1942 12/25/20 0204 12/26/20 0325 12/26/20 2347   NA  --  136 135 137   K  --  3.4* 4.0 3.7   CL  --  97* 95* 95*   CO2  --  28 27 30   GLUCOSE  --  122* 124* 108*   BUN  --  16 14 16   CREATININE  --  1.03 1.00 1.13   MG  --  1.9 1.8 1.9   ANIONGAP  --  11 13 12   LABGLOM  --  >60 >60 >60   GFRAA  --  >60 >60 >60   CALCIUM  --  8.8 8.8 8.9   PHOS  --  3.4 3.3 3.5   PROBNP  --   --  8,087*  --    TROPHS 72* 74*  --   --      Recent Labs     12/25/20 0204 12/26/20 0325 12/26/20 2347   LABALBU 3.2* 3.3* 3.1*     ABG:No results found for: POCPH, PHART, PH, POCPCO2, MOJ7RNR, PCO2, POCPO2, PO2ART, PO2, POCHCO3, SKY1OLE, HCO3, NBEA, PBEA, BEART, BE, THGBART, THB, IKW7QBB, BYPG7YTI, S8VYRJRH, O2SAT, FIO2  Lab Results   Component Value Date/Time    SPECIAL NOT REPORTED 12/24/2020 09:29 AM     Lab Results   Component Value Date/Time    CULTURE NO GROWTH 3 DAYS 12/24/2020 09:29 AM       Radiology:  Meaghan Lares Chest (2 Vw)    Result Date: 12/22/2020  Right pleural effusion. Question of whether some of this is loculated. Question of whether there could be a central obstructing process as there is prominence of the right perihilar structures without definite pulmonary congestion. Large body habitus. Xr Abdomen (kub) (single Ap View)    Result Date: 12/21/2020  Nonspecific nonobstructive bowel gas pattern.      Xr Chest Portable    Result Date: 12/21/2020  Cardiomegaly and pulmonary vascular congestion. Moderate to large right pleural effusion and right lower lobe airspace disease, atelectasis versus pneumonia. Us Retroperitoneal Complete    Result Date: 12/23/2020  Unremarkable ultrasound of the kidneys and urinary bladder. Moderate ascites. Physical Examination:        General appearance:  alert, cooperative he does he appears more comfortable  Mental Status:  oriented to person, place and time and normal affect  Lungs: Coarse auscultation in upper and lower lobes bilaterally with diminished breath sounds in the right lung field that has improved since thoracentesis  Heart:  Irregular heart rate, Tachycardic, no murmur  Abdomen:  Distended, improving compared to 12/22, bowel sounds present. No hepatomegaly  Extremities:  +2 pitting edema, redness, tenderness in the calves. +scrotal edema  Skin:  no gross lesions, rashes, induration    Assessment:        Hospital Problems           Last Modified POA    * (Principal) Acute on chronic systolic congestive heart failure ([address] 12/22/2020 Yes    New onset of congestive heart failure ([address] 12/22/2020 Yes    New onset a-fib ([address] 12/21/2020 Yes    Other specified hypothyroidism 12/21/2020 Yes    Elevated troponin 12/21/2020 Yes    Elevated serum creatinine 12/21/2020 Yes    Class 3 severe obesity due to excess calories with body mass index (BMI) of 40.0 to 44.9 in adult [facility]) 12/21/2020 Yes    Edema 12/21/2020 Yes    Constipation 12/21/2020 Yes    Anasarca 12/21/2020 Yes    TSH elevation 12/21/2020 Yes    Hyperkalemia 12/21/2020 Yes    Atrial fibrillation, rapid ([address] 12/21/2020 Yes    Acute hypokalemia 12/25/2020 Yes          Plan:        1. Acute exacerbation of newly diagnosed HFrEF: Echo 12/21 EF: 35-40%. Lasix switched to 60 BID. Strict I/O. Net negative 13L. Keep potassium>4, magnesium >2. 1200 cc fluid restriction. Monitor Scr. 2 gram salt restriction.  will need cardiac catheterization after acute process resolves. Suspect nonischemic due to ETOH use. 2. Atrial fibrillation with RVR: Rate better controlled. Lopressor 75 mg BID, Digoxin 250 ug, amiodarone (added 12/25). D/c heparin gtt, start Lovenox sub Q for tx  3. Acute hypokalemia: Replace electrolytes today, keep potassium above 4  4. NSTEMI type II: 2/2 atrial fibrillation and CHF exacerbation. Will need cardiac catheterization prior to discharge. 5. Subclinical Hypothyroidism: TSH: 12.6. Will start low dose synthroid 50 ug. Will need continued outpatient follow up   6. Large pleural effusion: Likely 2/2 above, diagnostic/theraputic thoracentesis 12/24 1.2 L removed, Transudate by lights criteria   7. ETOH abuse: Without symptoms of withdrawal. CIWA Thiamine, folate supplementation. Continue daily MVI. 8. Constipation: continue bowel regimen. 9. Elevated Bilirubin: improving. 10. Obesity with BMI of 44.6  11. Labs, imaging reviewed.       Hoang Kee DO  12/27/2020  5:19 PM

## 2020-12-28 ENCOUNTER — APPOINTMENT (OUTPATIENT)
Dept: CARDIAC CATH/INVASIVE PROCEDURES | Age: 55
DRG: 246 | End: 2020-12-28

## 2020-12-28 LAB
ABSOLUTE EOS #: 0 K/UL (ref 0–0.4)
ABSOLUTE IMMATURE GRANULOCYTE: 0 K/UL (ref 0–0.3)
ABSOLUTE LYMPH #: 0.7 K/UL (ref 1–4.8)
ABSOLUTE MONO #: 0.18 K/UL (ref 0.1–0.8)
ALBUMIN SERPL-MCNC: 3.4 G/DL (ref 3.5–5.2)
ANION GAP SERPL CALCULATED.3IONS-SCNC: 14 MMOL/L (ref 9–17)
APPEARANCE FLUID: NORMAL
BASO FLUID: NORMAL %
BASOPHILS # BLD: 0 % (ref 0–2)
BASOPHILS ABSOLUTE: 0 K/UL (ref 0–0.2)
BUN BLDV-MCNC: 18 MG/DL (ref 6–20)
BUN/CREAT BLD: ABNORMAL (ref 9–20)
CALCIUM SERPL-MCNC: 9.3 MG/DL (ref 8.6–10.4)
CHLORIDE BLD-SCNC: 94 MMOL/L (ref 98–107)
CO2: 31 MMOL/L (ref 20–31)
COLOR FLUID: NORMAL
CREAT SERPL-MCNC: 1.03 MG/DL (ref 0.7–1.2)
DIFFERENTIAL TYPE: ABNORMAL
EOSINOPHIL FLUID: NORMAL %
EOSINOPHILS RELATIVE PERCENT: 0 % (ref 1–4)
FLUID DIFF COMMENT: NORMAL
GFR AFRICAN AMERICAN: >60 ML/MIN
GFR NON-AFRICAN AMERICAN: >60 ML/MIN
GFR SERPL CREATININE-BSD FRML MDRD: ABNORMAL ML/MIN/{1.73_M2}
GFR SERPL CREATININE-BSD FRML MDRD: ABNORMAL ML/MIN/{1.73_M2}
GLUCOSE BLD-MCNC: 109 MG/DL (ref 70–99)
HCT VFR BLD CALC: 43.7 % (ref 40.7–50.3)
HEMOGLOBIN: 13.4 G/DL (ref 13–17)
IMMATURE GRANULOCYTES: 0 %
LV EF: 45 %
LVEF MODALITY: NORMAL
LYMPHOCYTES # BLD: 16 % (ref 24–44)
LYMPHOCYTES, BODY FLUID: 33 %
MAGNESIUM: 2 MG/DL (ref 1.6–2.6)
MCH RBC QN AUTO: 31.4 PG (ref 25.2–33.5)
MCHC RBC AUTO-ENTMCNC: 30.7 G/DL (ref 28.4–34.8)
MCV RBC AUTO: 102.3 FL (ref 82.6–102.9)
MONOCYTE, FLUID: NORMAL %
MONOCYTES # BLD: 4 % (ref 1–7)
MORPHOLOGY: NORMAL
NEUTROPHIL, FLUID: 7 %
NRBC AUTOMATED: 0 PER 100 WBC
OTHER CELLS FLUID: NORMAL %
PDW BLD-RTO: 12.8 % (ref 11.8–14.4)
PHOSPHORUS: 3.8 MG/DL (ref 2.5–4.5)
PLATELET # BLD: 159 K/UL (ref 138–453)
PLATELET ESTIMATE: ABNORMAL
PMV BLD AUTO: 12.7 FL (ref 8.1–13.5)
POTASSIUM SERPL-SCNC: 4 MMOL/L (ref 3.7–5.3)
RBC # BLD: 4.27 M/UL (ref 4.21–5.77)
RBC # BLD: ABNORMAL 10*6/UL
RBC FLUID: <3000 /MM3
SEG NEUTROPHILS: 80 % (ref 36–66)
SEGMENTED NEUTROPHILS ABSOLUTE COUNT: 3.52 K/UL (ref 1.8–7.7)
SODIUM BLD-SCNC: 139 MMOL/L (ref 135–144)
SPECIMEN TYPE: NORMAL
WBC # BLD: 4.4 K/UL (ref 3.5–11.3)
WBC # BLD: ABNORMAL 10*3/UL
WBC FLUID: 831 /MM3

## 2020-12-28 PROCEDURE — 6370000000 HC RX 637 (ALT 250 FOR IP): Performed by: NURSE PRACTITIONER

## 2020-12-28 PROCEDURE — 6370000000 HC RX 637 (ALT 250 FOR IP): Performed by: INTERNAL MEDICINE

## 2020-12-28 PROCEDURE — 6370000000 HC RX 637 (ALT 250 FOR IP)

## 2020-12-28 PROCEDURE — 6360000002 HC RX W HCPCS: Performed by: INTERNAL MEDICINE

## 2020-12-28 PROCEDURE — 92928 PRQ TCAT PLMT NTRAC ST 1 LES: CPT | Performed by: INTERNAL MEDICINE

## 2020-12-28 PROCEDURE — C1894 INTRO/SHEATH, NON-LASER: HCPCS

## 2020-12-28 PROCEDURE — C1769 GUIDE WIRE: HCPCS

## 2020-12-28 PROCEDURE — 6360000002 HC RX W HCPCS

## 2020-12-28 PROCEDURE — B2151ZZ FLUOROSCOPY OF LEFT HEART USING LOW OSMOLAR CONTRAST: ICD-10-PCS | Performed by: INTERNAL MEDICINE

## 2020-12-28 PROCEDURE — 4A023N7 MEASUREMENT OF CARDIAC SAMPLING AND PRESSURE, LEFT HEART, PERCUTANEOUS APPROACH: ICD-10-PCS | Performed by: INTERNAL MEDICINE

## 2020-12-28 PROCEDURE — 93458 L HRT ARTERY/VENTRICLE ANGIO: CPT | Performed by: INTERNAL MEDICINE

## 2020-12-28 PROCEDURE — B246ZZ4 ULTRASONOGRAPHY OF RIGHT AND LEFT HEART, TRANSESOPHAGEAL: ICD-10-PCS | Performed by: INTERNAL MEDICINE

## 2020-12-28 PROCEDURE — 92960 CARDIOVERSION ELECTRIC EXT: CPT | Performed by: INTERNAL MEDICINE

## 2020-12-28 PROCEDURE — C1725 CATH, TRANSLUMIN NON-LASER: HCPCS

## 2020-12-28 PROCEDURE — 2500000003 HC RX 250 WO HCPCS

## 2020-12-28 PROCEDURE — 5A2204Z RESTORATION OF CARDIAC RHYTHM, SINGLE: ICD-10-PCS | Performed by: INTERNAL MEDICINE

## 2020-12-28 PROCEDURE — 85025 COMPLETE CBC W/AUTO DIFF WBC: CPT

## 2020-12-28 PROCEDURE — 93325 DOPPLER ECHO COLOR FLOW MAPG: CPT

## 2020-12-28 PROCEDURE — 93312 ECHO TRANSESOPHAGEAL: CPT

## 2020-12-28 PROCEDURE — 6360000004 HC RX CONTRAST MEDICATION

## 2020-12-28 PROCEDURE — 027034Z DILATION OF CORONARY ARTERY, ONE ARTERY WITH DRUG-ELUTING INTRALUMINAL DEVICE, PERCUTANEOUS APPROACH: ICD-10-PCS | Performed by: INTERNAL MEDICINE

## 2020-12-28 PROCEDURE — 2060000000 HC ICU INTERMEDIATE R&B

## 2020-12-28 PROCEDURE — 36415 COLL VENOUS BLD VENIPUNCTURE: CPT

## 2020-12-28 PROCEDURE — B2111ZZ FLUOROSCOPY OF MULTIPLE CORONARY ARTERIES USING LOW OSMOLAR CONTRAST: ICD-10-PCS | Performed by: INTERNAL MEDICINE

## 2020-12-28 PROCEDURE — 2709999900 HC NON-CHARGEABLE SUPPLY

## 2020-12-28 PROCEDURE — 99232 SBSQ HOSP IP/OBS MODERATE 35: CPT | Performed by: INTERNAL MEDICINE

## 2020-12-28 PROCEDURE — 83735 ASSAY OF MAGNESIUM: CPT

## 2020-12-28 PROCEDURE — C1874 STENT, COATED/COV W/DEL SYS: HCPCS

## 2020-12-28 PROCEDURE — 80069 RENAL FUNCTION PANEL: CPT

## 2020-12-28 PROCEDURE — C1887 CATHETER, GUIDING: HCPCS

## 2020-12-28 RX ORDER — CLOPIDOGREL BISULFATE 75 MG/1
75 TABLET ORAL DAILY
Status: DISCONTINUED | OUTPATIENT
Start: 2020-12-29 | End: 2020-12-30 | Stop reason: HOSPADM

## 2020-12-28 RX ORDER — SODIUM CHLORIDE 0.9 % (FLUSH) 0.9 %
10 SYRINGE (ML) INJECTION EVERY 12 HOURS SCHEDULED
Status: CANCELLED | OUTPATIENT
Start: 2020-12-28

## 2020-12-28 RX ORDER — ACETAMINOPHEN 325 MG/1
650 TABLET ORAL EVERY 4 HOURS PRN
Status: CANCELLED | OUTPATIENT
Start: 2020-12-28

## 2020-12-28 RX ORDER — SODIUM CHLORIDE 0.9 % (FLUSH) 0.9 %
10 SYRINGE (ML) INJECTION PRN
Status: CANCELLED | OUTPATIENT
Start: 2020-12-28

## 2020-12-28 RX ORDER — ASPIRIN 81 MG/1
81 TABLET ORAL DAILY
Status: DISCONTINUED | OUTPATIENT
Start: 2020-12-29 | End: 2020-12-30 | Stop reason: HOSPADM

## 2020-12-28 RX ADMIN — FUROSEMIDE 60 MG: 10 INJECTION, SOLUTION INTRAMUSCULAR; INTRAVENOUS at 07:51

## 2020-12-28 RX ADMIN — MAGNESIUM GLUCONATE 500 MG ORAL TABLET 400 MG: 500 TABLET ORAL at 07:54

## 2020-12-28 RX ADMIN — DOCUSATE SODIUM 100 MG: 100 CAPSULE, LIQUID FILLED ORAL at 07:50

## 2020-12-28 RX ADMIN — LISINOPRIL 5 MG: 10 TABLET ORAL at 07:53

## 2020-12-28 RX ADMIN — FUROSEMIDE 60 MG: 10 INJECTION, SOLUTION INTRAMUSCULAR; INTRAVENOUS at 17:20

## 2020-12-28 RX ADMIN — METOPROLOL TARTRATE 75 MG: 50 TABLET, FILM COATED ORAL at 07:55

## 2020-12-28 RX ADMIN — THERA TABS 1 TABLET: TAB at 07:56

## 2020-12-28 RX ADMIN — Medication 100 MG: at 07:57

## 2020-12-28 RX ADMIN — DOCUSATE SODIUM 100 MG: 100 CAPSULE, LIQUID FILLED ORAL at 20:44

## 2020-12-28 RX ADMIN — LEVOTHYROXINE SODIUM 50 MCG: 50 TABLET ORAL at 06:37

## 2020-12-28 RX ADMIN — ENOXAPARIN SODIUM 220 MG: 100 INJECTION SUBCUTANEOUS at 07:58

## 2020-12-28 RX ADMIN — SPIRONOLACTONE 25 MG: 25 TABLET ORAL at 07:56

## 2020-12-28 RX ADMIN — AMIODARONE HYDROCHLORIDE 200 MG: 200 TABLET ORAL at 07:50

## 2020-12-28 RX ADMIN — METOPROLOL TARTRATE 75 MG: 50 TABLET, FILM COATED ORAL at 21:09

## 2020-12-28 RX ADMIN — DIGOXIN 250 MCG: 250 TABLET ORAL at 09:25

## 2020-12-28 ASSESSMENT — PAIN SCALES - GENERAL: PAINLEVEL_OUTOF10: 0

## 2020-12-28 NOTE — PROGRESS NOTES
Nutrition Assessment     Type and Reason for Visit: Initial(LOS)    Nutrition Recommendations/Plan: Continue current diet. Encourage/monitor PO intakes as tolerated. Provide ONS as/if needed. Nutrition Assessment:  Pt seen based on length of stay. Admitted with c/o chest pain, SOB, b/l leg swelling, anasarca, and weight gain. Weight fluctuations since admission noted. Pt recieved CHF diet education during admission. Pt reports usual good appetite. Consuming more than 75% of meals. Labs/Meds reviewed. Malnutrition Assessment:  Malnutrition Status: At risk for malnutrition (Comment)    Estimated Daily Nutrient Needs:  Energy (kcal): 16-18 kcal/kg = 2671-6852 kcals/day; Weight Used for Energy Requirements:  Current     Protein (g): 1.2-1.4 gm/kg =  gm pro/day; Weight Used for Protein Requirements:  Ideal          Nutrition Related Findings: Distended abdomen. Labs: reviewed. Meds: Lasix, MVI, Aldactone, Thiamine. Last BM 12/27. Current Nutrition Therapies:    Diet NPO, After Midnight    Anthropometric Measures:  · Height: 5' 11\" (180.3 cm)  · Current Body Wt: 282 lb 6.6 oz (128.1 kg)   · BMI: 39.4    Nutrition Diagnosis:   · Food & Nutrition-related knowledge deficit related to cardiac dysfunction as evidenced by (newly diagnosed CHF; need for diet education)    Nutrition Interventions:   Food and/or Nutrient Delivery:  Continue Current Diet  Nutrition Education/Counseling:  Education completed(CHF/Low Sodium diet education provided)   Coordination of Nutrition Care:  Continue to monitor while inpatient    Goals:  Oral intakes to meet % of estimated nutrition needs.        Nutrition Monitoring and Evaluation:   Food/Nutrient Intake Outcomes:  Food and Nutrient Intake  Physical Signs/Symptoms Outcomes:  Biochemical Data, GI Status, Nutrition Focused Physical Findings, Skin, Weight     Electronically signed by Mau Reaves RD, MARYSOL on 12/28/20 at 10:50 AM EST    Contact:

## 2020-12-28 NOTE — OP NOTE
[facility]    CARDIAC CATHETERIZATION    Date:   12/28/2020  Patient name:  Carl Brito  Date of admission:  12/21/2020  2:39 PM  MRN:   [id number]  YOB: 1965    Operators:  Primary:   Madina Metzger MD (Attending Physician)      Procedure performed:       [x] Left Heart Catheterization. [] Graft Angiography. [x] Left Ventriculography. [] Right Heart Catheterization. [x] Coronary Angiography. [] Aortic Valve Studies. [] PCI:      [] Other:       Pre Procedure Conscious Sedation Data:  ASA Class:    [] I [x] II [] III [] IV    Mallampati Class:  [] I [x] II [] III [] IV      Indication:  [] STEMI      [] + Stress test  [] ACS      [] + EKG Changes  [x] Non Q MI       [x] Significant Risk Factors  [] Recurrent Angina             [] Diabetes Mellitus    [] New LBBB      [] Uncontrolled HTN. [x] CHF / Low EF changes     [] Abnormal CTA / Ca Score      Procedure:  Access:  [] Femoral  [x] Radial  artery       [x] Right  [] Left    Procedure: After informed consent was obtained with explanation of the risks and benefits, patient was brought to the cath lab. The access area was prepped and draped in sterile fashion. 1% lidocaine was used for local block. The artery was cannulated with 6  Fr sheath with brisk arterial blood return. The side port was frequently flushed and aspirated with normal saline. Findings:     LMCA: Normal 0% stenosis. LAD: Diffuse irregularities 30-40%. LCx: Single stenosis.       Lesion on Mid CX: reduced to 0%. Post Procedure IVETTE III flow was present.     The lesion was diagnosed as Moderate Risk (B).       Devices used         - Luge Wire 182 cm. Number of passes: 1.         - Xience Cyndee 3.0 x 12 TRENTON. 1 inflation(s) to a max pressure of: 12     felicita.       RCA: Diffuse irregularities 30-40%.      Coronary Tree        Dominance: Right     The LV gram was performed in the ABEBE 30 position. LVEF: 50%.      Estimated Blood Loss: 5 mL     Conclusions        Procedure Summary        Successful PCI / TRENTON of mid LCX.    Non-obstructive LAD and RCA disease.    Borderline LV systolic function.        Recommendations        Routine Post Stent Orders.    Medical therapy as needed.    Risk factor modification. ____________________________________________________________________    History and Risk Factors    [] Hypertension     [] Family history of CAD  [] Hyperlipidemia     [] Cerebrovascular Disease   [] Prior MI       [] Peripheral Vascular disease   [] Prior PCI              [] Diabetes Mellitus    [] Left Main PCI. [] Currently on Dialysis. [] Prior CABG. [] Currently smoker. [] Cardiac Arrest outside of healthcare facility. [] Yes    [x] No        Witnessed     [] Yes   [] No     Arrest after arrival of EMS  [] Yes   [] No     [] Cardiac Arrest at other Facility. [] Yes   [x] No    Pre-Procedure Information. Heart Failure       [x] Yes    [] No        Class  [] I      [x] II  [] III    [] IV. New Diagnosis    [x] Yes  [] No    HF Type      [x] Systolic   [] Diastolic          [] Unknown. Diagnostic Test:   EKG       [] Normal   [x] Abnormal    New antiarrhythmia medications:    [x] Yes   [] No   New onset atrial fibrillation / Flutter     [x] Yes   [] No   ECG Abnormalities:      [] V. Fib   [] Shantel V. Tach           [] NS V. T   [] New LBBB           [] T.  Inv  []  ST dev > 0.5 mm         [] PVC's freq  [] PVC's infrequent    Stress Test Performed:      [] Yes    [x] No     Type:     [] Stress Echo   [] Exercise Stress Test (no imaging)      [] Stress Nuclear  [] Stress Imaging     Results   [] Negative   [] Positive        [] Indeterminate  [] Unavailable     If Positive/ Risk / Extent of Ischemia:       [] Low  [] Intermediate         [] High  [] Unavailable      Cardiac CTA Performed:     [] Yes    [x] No      Results   [] CAD   [] Non obstructive CAD      [] No CAD   [] Uncertain      [] Unknown   [] Structural Disease. Pre Procedure Medications:   [x] Yes    [] No         [] ASA   [x] Beta Blockers      [] Nitrate   [] Ca Channel Blockers      [] Ranolazine   [] Statin       [] Plavix/Others antiplatelets      Electronically signed on 12/28/2020 at 4:19 PM by:    Juma Barkley MD  Fellow, [id number] [facility]    Physician Statement  I have discussed the case of John Cheng including pertinent history and exam findings with the resident. I have seen and examined the patient and the key elements of the encounter have been performed by me. I agree with the assessment, plan and orders as documented by the resident With changes made to the note. Procedure performed by me.     Electronically signed by Cassia Gandara MD on 12/29/2020 at 12:16 PM.    [facility]      [phone]

## 2020-12-28 NOTE — PROGRESS NOTES
Patient admitted, consent signed and questions answered. Patient ready for procedure. Call light to reach with side rails up 2 of 2. Bilateral groin clipped. History and physical completed.

## 2020-12-28 NOTE — PROGRESS NOTES
[facility]  Office: [address], DO, Valerie Johnson, DO, Khushboo Perez, DO, Luis Fernando Clark, DO, Bob Alvarez MD, Diana Vauhgn MD, Faustina Chavez MD, Cee Jimenez MD, Chinmay Banks MD, Loree Clark MD, Neisha Townsend MD, Marshall Davila MD, Troy Morelos MD, Marc Page, DO, Tanja Phan MD, Radha Markham MD, Jamie Irvin, DO, Jeremy Ballesteros MD,  Phyllis Bolden DO, Deysi García MD, Darryle Lew, MD, Vivian Reddy CNP, [facility], [facility], Ambar Doe, CNP, Michaela Blunt, CNS, Usha De Paz, CNP, Margarita Gasca, CNP, Sim Martinez, CNP, Kushal Reddy, CNP, Nikki Queen, CNP, Bahman Alford PA-C, Kelby Proctor, [facility], Keila Lyons, CNP, Moriah Koehler, CNP, Mikie Narvaez, CNP, Samantha Lino, CNP, Johnna Singh, [facility]   [address] [facility] [place]    Progress Note    12/28/2020    5:23 PM    Name:   Lopez Garcia  MRN:     [id number]     Acct:      [de-identified]   Room:   [facility]0401-01   Day:  7  Admit Date:  12/21/2020  2:39 PM    PCP:   No primary care provider on file. Code Status:  Full Code    Subjective:     C/C:   Chief Complaint   Patient presents with    Chest Pain     new onset a-fib with rvr     Interval History Status: not changed. Patient seen and examined at bedside. Patient with 4100 cc of urine output over the last 24 hours and is net -21 L since admission. Scrotal edema has significantly improved. Patient denies any chest pain, shortness of breath, difficulty breathing. Heart rate has been stable. Brief History: This is a 68-year-old male who presented with diffuse anasarca found to be newly diagnosed heart failure with reduced ejection fraction. EF 35-40% on echocardiogram from 12/22/2020. Patient started on IV diuresis with good response. Currently monitoring creatinine, electrolytes, I's/O's. Patient also in atrial fibrillation, he was started on metoprolol and digoxin.   Cardizem was held due to decreased EF and he was started on Lopressor which was titrated to 75 mg twice daily, digoxin, and amiodarone. Review of Systems:     Constitutional:  negative for chills, fevers, sweats  Respiratory: Admits to some dyspnea that has improved since thoracentesis cardiovascular:  negative for chest pain, chest pressure/discomfort, admits to diffuse lower extremity edema that is improving, scrotal edema, abdominal distention. Gastrointestinal:  negative for abdominal pain, constipation, diarrhea, nausea, vomiting. Abdomen is distended per patient  Neurological:  negative for dizziness, headache    Medications: Allergies: Allergies   Allergen Reactions    Seasonal        Current Meds:   Scheduled Meds:    [START ON 12/29/2020] enoxaparin  1.5 mg/kg Subcutaneous Daily    [START ON 12/29/2020] aspirin  81 mg Oral Daily    [START ON 12/29/2020] clopidogrel  75 mg Oral Daily    furosemide  60 mg Intravenous BID    amiodarone  200 mg Oral Daily    spironolactone  25 mg Oral Daily    magnesium oxide  400 mg Oral Daily    metoprolol tartrate  75 mg Oral BID    digoxin  250 mcg Oral Daily    thiamine  100 mg Oral Daily    multivitamin  1 tablet Oral Daily    docusate sodium  100 mg Oral BID    lisinopril  5 mg Oral Daily    levothyroxine  50 mcg Oral Daily     Continuous Infusions:     PRN Meds: potassium chloride **OR** potassium alternative oral replacement **OR** potassium chloride, bisacodyl, promethazine **OR** ondansetron, polyethylene glycol, acetaminophen **OR** acetaminophen    Data:     Past Medical History:   has a past medical history of Anasarca, Elevated troponin, Hypothyroidism, New onset atrial fibrillation ([facility] [address] and New onset of congestive heart failure ([facility] [address] Social History:   reports that he has never smoked. His smokeless tobacco use includes chew. He reports current alcohol use of about 30.0 standard drinks of alcohol per week.  He reports that he does not use drugs. Family History:   Family History   Problem Relation Age of Onset    Atrial Fibrillation Mother     Stroke Father        Vitals:  BP (!) 133/92   Pulse 100   Temp 97.9 °F (36.6 °C) (Oral)   Resp 25   Ht 5' 11\" (1.803 m)   Wt 282 lb 6.6 oz (128.1 kg)   SpO2 98%   BMI 39.39 kg/m²   Temp (24hrs), Av.9 °F (36.6 °C), Min:97.7 °F (36.5 °C), Max:98.2 °F (36.8 °C)    No results for input(s): POCGLU in the last 72 hours. I/O (24Hr): Intake/Output Summary (Last 24 hours) at 2020 1723  Last data filed at 2020 1227  Gross per 24 hour   Intake 480 ml   Output 4950 ml   Net -4470 ml       Labs:  Hematology:  Recent Labs     2012   WBC 4.5 4.9 4.4   RBC 4.00* 4.13* 4.27   HGB 12.6* 12.7* 13.4   HCT 40.9 41.7 43.7   .3 101.0 102.3   MCH 31.5 30.8 31.4   MCHC 30.8 30.5 30.7   RDW 12.6 12.7 12.8   PLT See Reflexed IPF Result See Reflexed IPF Result 159   MPV NOT REPORTED NOT REPORTED 12.7     Chemistry:  Recent Labs     20  0512    137 139   K 4.0 3.7 4.0   CL 95* 95* 94*   CO2 27 30 31   GLUCOSE 124* 108* 109*   BUN 14 16 18   CREATININE 1.00 1.13 1.03   MG 1.8 1.9 2.0   ANIONGAP 13 12 14   LABGLOM >60 >60 >60   GFRAA >60 >60 >60   CALCIUM 8.8 8.9 9.3   PHOS 3.3 3.5 3.8   PROBNP 8,087*  --   --      Recent Labs     20  0512   LABALBU 3.3* 3.1* 3.4*     ABG:No results found for: POCPH, PHART, PH, POCPCO2, MJA0JCE, PCO2, POCPO2, PO2ART, PO2, POCHCO3, IVI4RIY, HCO3, NBEA, PBEA, BEART, BE, THGBART, THB, BBL9RBY, FBVG2LCO, M7EICZIM, O2SAT, FIO2  Lab Results   Component Value Date/Time    SPECIAL NOT REPORTED 2020 09:29 AM     Lab Results   Component Value Date/Time    CULTURE NO GROWTH 4 DAYS 2020 09:29 AM       Radiology:  [facility] Chest (2 Vw)    Result Date: 2020  Right pleural effusion. Question of whether some of this is loculated.  Question of whether there could be a central obstructing process as there is prominence of the right perihilar structures without definite pulmonary congestion. Large body habitus. Xr Abdomen (kub) (single Ap View)    Result Date: 12/21/2020  Nonspecific nonobstructive bowel gas pattern. Xr Chest Portable    Result Date: 12/21/2020  Cardiomegaly and pulmonary vascular congestion. Moderate to large right pleural effusion and right lower lobe airspace disease, atelectasis versus pneumonia. Us Retroperitoneal Complete    Result Date: 12/23/2020  Unremarkable ultrasound of the kidneys and urinary bladder. Moderate ascites. Physical Examination:        General appearance:  alert, cooperative he does he appears more comfortable  Mental Status:  oriented to person, place and time and normal affect  Lungs: Coarse auscultation in upper and lower lobes bilaterally with diminished breath sounds in the right lung field that has improved since thoracentesis  Heart:  Irregular heart rate, Tachycardic, no murmur  Abdomen:  Distended, improving compared to 12/22, bowel sounds present. No hepatomegaly  Extremities:  +2 pitting edema, redness, tenderness in the calves. +scrotal edema  Skin:  no gross lesions, rashes, induration    Assessment:        Hospital Problems           Last Modified POA    * (Principal) Acute on chronic systolic congestive heart failure ([address] 12/22/2020 Yes    New onset of congestive heart failure ([address] 12/22/2020 Yes    New onset a-fib ([address] 12/21/2020 Yes    Other specified hypothyroidism 12/21/2020 Yes    Elevated troponin 12/21/2020 Yes    Elevated serum creatinine 12/21/2020 Yes    Class 3 severe obesity due to excess calories with body mass index (BMI) of 40.0 to 44.9 in adult [facility]) 12/21/2020 Yes    Edema 12/21/2020 Yes    Constipation 12/21/2020 Yes    Anasarca 12/21/2020 Yes    TSH elevation 12/21/2020 Yes    Hyperkalemia 12/21/2020 Yes    Atrial fibrillation, rapid ([address] 12/21/2020 Yes    Acute hypokalemia 12/25/2020 Yes          Plan:        1. Acute exacerbation of newly diagnosed HFrEF: Echo 12/21 EF: 35-40%. Lasix  60 BID. Strict I/O. Net negative 21L. Keep potassium>4, magnesium >2. 1200 cc fluid restriction. Monitor Scr. 2 gram salt restriction. will need cardiac catheterization after acute process resolves. Suspect nonischemic due to ETOH use. 2. Atrial fibrillation with RVR: Rate better controlled. Lopressor 75 mg BID, Digoxin 250 ug, amiodarone (added 12/25). D/c heparin gtt, start Lovenox sub Q for tx. Will get NEELIMA/cardioversion 12/28  3. Acute hypokalemia: Replace electrolytes today, keep potassium above 4  4. NSTEMI type II: 2/2 atrial fibrillation and CHF exacerbation. Will need cardiac catheterization prior to discharge. 5. Subclinical Hypothyroidism: TSH: 12.6. Will start low dose synthroid 50 ug. Will need continued outpatient follow up   6. Large pleural effusion: Likely 2/2 above, diagnostic/theraputic thoracentesis 12/24 1.2 L removed, Transudate by lights criteria   7. ETOH abuse: Without symptoms of withdrawal. CIWA Thiamine, folate supplementation. Continue daily MVI. 8. Constipation: continue bowel regimen. 9. Elevated Bilirubin: improving. 10. Obesity with BMI of 44.6  11. Labs, imaging reviewed.       Afshin Willis DO  12/28/2020  5:23 PM

## 2020-12-28 NOTE — OP NOTE
[facility]  Transesophageal Echocardiogram and Cardioversion       Today's Date:  12/28/2020  Ordering Physician:  Dr. Juju Denton  Indication:   New onset Afib    Operators:  Primary:   Dr. Juan Diego Kam (Attending Physician)  Assistant:   Tyler Arredondo MD (Cardiovascular Fellow)    Pre Procedure Conscious Sedation Data:    ASA Class:    [] I [x] II [] III [] IV    Mallampati Class:  [] I [x] II [] III [] IV    Patient seen and examined. History and Physical reviewed. Labs reviewed. After informed consent was obtained with explanation of the risks and benefits, the patient was brought to Cath lab. All sedation was administered by the cardiologist. The oropharynx was pre anesthetisized with cetacaine spray. NEELIMA:    Structures:    LA:  Normal  TAIWO:  No thrombus  RA:  Normal  RV:   Normal  LV:  Normal  Estimated LVEF: 45%  Aorta:   No significant atheromatous disease  Pericardium: No pericardial effusion  Septum:  No intracardiac shunt via color Doppler. Valves:    Mitral Valve:  Structurally normal. Mild regurgitation is identified with multiple small jets. Aortic Valve: The aortic valve is trileaflet and opens adequately. Trivial regurgitiation is identified. Tricuspid valve: Structurally normal. Mild regurgitation is identified. Pulmonary valve: Normal. No significant regurgitation    No valvular vegetations or thrombus identified. NEELIMA Summary:     1. A NEELIMA was performed without complications. 2. LVEF 45%  3. No thrombus or valvular vegetation identified  4. Proceed with Cardioversion        CARDIOVERSION:    After an adequate level of sedation was achieved, 200J in biphasic synchronized delivery was administered. conversion to normal sinus rhythm. Cardioversion was successful. The patient awoke without complications. There were no complications encountered.     The patient will continue with the discharge meds and has been instructed to follow-up with Cardiologist for continued long term care and cardiovascular management. Electronically signed by Bryce Cornell MD on 12/28/2020 at 3:49 PM  Cardiovascular Diseases Fellow  [address] [facility]      Attending Physician Statement  I have discussed the case of Shad Ruggiero including pertinent history and exam findings with the resident. I have seen and examined the patient and the key elements of the encounter have been performed by me. I agree with the assessment, plan and orders as documented by the resident With changes made to the note.   I was present during entire procedure and performed all critical elements of the procedure    Electronically signed by Gloria Treviño MD on 12/29/2020 at 12:02 PM.    [facility]      [phone]

## 2020-12-29 ENCOUNTER — APPOINTMENT (OUTPATIENT)
Dept: GENERAL RADIOLOGY | Age: 55
DRG: 246 | End: 2020-12-29

## 2020-12-29 LAB
ABSOLUTE EOS #: 0.32 K/UL (ref 0–0.4)
ABSOLUTE IMMATURE GRANULOCYTE: 0 K/UL (ref 0–0.3)
ABSOLUTE LYMPH #: 0.59 K/UL (ref 1–4.8)
ABSOLUTE MONO #: 0.32 K/UL (ref 0.1–0.8)
ALBUMIN SERPL-MCNC: 3.3 G/DL (ref 3.5–5.2)
ANION GAP SERPL CALCULATED.3IONS-SCNC: 12 MMOL/L (ref 9–17)
BASOPHILS # BLD: 0 % (ref 0–2)
BASOPHILS ABSOLUTE: 0 K/UL (ref 0–0.2)
BUN BLDV-MCNC: 16 MG/DL (ref 6–20)
BUN/CREAT BLD: ABNORMAL (ref 9–20)
CALCIUM SERPL-MCNC: 9.2 MG/DL (ref 8.6–10.4)
CHLORIDE BLD-SCNC: 97 MMOL/L (ref 98–107)
CO2: 30 MMOL/L (ref 20–31)
CREAT SERPL-MCNC: 0.95 MG/DL (ref 0.7–1.2)
DIFFERENTIAL TYPE: ABNORMAL
EOSINOPHILS RELATIVE PERCENT: 7 % (ref 1–4)
GFR AFRICAN AMERICAN: >60 ML/MIN
GFR NON-AFRICAN AMERICAN: >60 ML/MIN
GFR SERPL CREATININE-BSD FRML MDRD: ABNORMAL ML/MIN/{1.73_M2}
GFR SERPL CREATININE-BSD FRML MDRD: ABNORMAL ML/MIN/{1.73_M2}
GLUCOSE BLD-MCNC: 99 MG/DL (ref 70–99)
HCT VFR BLD CALC: 42.6 % (ref 40.7–50.3)
HEMOGLOBIN: 12.9 G/DL (ref 13–17)
IMMATURE GRANULOCYTES: 0 %
LYMPHOCYTES # BLD: 13 % (ref 24–44)
MAGNESIUM: 1.9 MG/DL (ref 1.6–2.6)
MCH RBC QN AUTO: 30.7 PG (ref 25.2–33.5)
MCHC RBC AUTO-ENTMCNC: 30.3 G/DL (ref 28.4–34.8)
MCV RBC AUTO: 101.4 FL (ref 82.6–102.9)
MONOCYTES # BLD: 7 % (ref 1–7)
MORPHOLOGY: NORMAL
NRBC AUTOMATED: 0 PER 100 WBC
PDW BLD-RTO: 12.5 % (ref 11.8–14.4)
PHOSPHORUS: 3.5 MG/DL (ref 2.5–4.5)
PLATELET # BLD: ABNORMAL K/UL (ref 138–453)
PLATELET ESTIMATE: ABNORMAL
PLATELET, FLUORESCENCE: 129 K/UL (ref 138–453)
PLATELET, IMMATURE FRACTION: 6.6 % (ref 1.1–10.3)
PMV BLD AUTO: ABNORMAL FL (ref 8.1–13.5)
POTASSIUM SERPL-SCNC: 3.8 MMOL/L (ref 3.7–5.3)
RBC # BLD: 4.2 M/UL (ref 4.21–5.77)
RBC # BLD: ABNORMAL 10*6/UL
SEG NEUTROPHILS: 73 % (ref 36–66)
SEGMENTED NEUTROPHILS ABSOLUTE COUNT: 3.27 K/UL (ref 1.8–7.7)
SODIUM BLD-SCNC: 139 MMOL/L (ref 135–144)
WBC # BLD: 4.5 K/UL (ref 3.5–11.3)
WBC # BLD: ABNORMAL 10*3/UL

## 2020-12-29 PROCEDURE — 80069 RENAL FUNCTION PANEL: CPT

## 2020-12-29 PROCEDURE — 6370000000 HC RX 637 (ALT 250 FOR IP): Performed by: INTERNAL MEDICINE

## 2020-12-29 PROCEDURE — 85055 RETICULATED PLATELET ASSAY: CPT

## 2020-12-29 PROCEDURE — 2060000000 HC ICU INTERMEDIATE R&B

## 2020-12-29 PROCEDURE — 83735 ASSAY OF MAGNESIUM: CPT

## 2020-12-29 PROCEDURE — 99232 SBSQ HOSP IP/OBS MODERATE 35: CPT | Performed by: FAMILY MEDICINE

## 2020-12-29 PROCEDURE — 6370000000 HC RX 637 (ALT 250 FOR IP): Performed by: NURSE PRACTITIONER

## 2020-12-29 PROCEDURE — 6370000000 HC RX 637 (ALT 250 FOR IP): Performed by: STUDENT IN AN ORGANIZED HEALTH CARE EDUCATION/TRAINING PROGRAM

## 2020-12-29 PROCEDURE — 71046 X-RAY EXAM CHEST 2 VIEWS: CPT

## 2020-12-29 PROCEDURE — 85025 COMPLETE CBC W/AUTO DIFF WBC: CPT

## 2020-12-29 PROCEDURE — 36415 COLL VENOUS BLD VENIPUNCTURE: CPT

## 2020-12-29 PROCEDURE — 6360000002 HC RX W HCPCS: Performed by: INTERNAL MEDICINE

## 2020-12-29 RX ORDER — AMIODARONE HYDROCHLORIDE 200 MG/1
200 TABLET ORAL DAILY
Qty: 90 TABLET | Refills: 4 | Status: SHIPPED | OUTPATIENT
Start: 2020-12-30

## 2020-12-29 RX ORDER — FUROSEMIDE 40 MG/1
40 TABLET ORAL 2 TIMES DAILY
Qty: 60 TABLET | Refills: 3 | Status: SHIPPED | OUTPATIENT
Start: 2020-12-29

## 2020-12-29 RX ORDER — FUROSEMIDE 40 MG/1
40 TABLET ORAL 2 TIMES DAILY
Status: DISCONTINUED | OUTPATIENT
Start: 2020-12-29 | End: 2020-12-30 | Stop reason: HOSPADM

## 2020-12-29 RX ORDER — ATORVASTATIN CALCIUM 80 MG/1
80 TABLET, FILM COATED ORAL NIGHTLY
Qty: 30 TABLET | Refills: 3 | Status: SHIPPED | OUTPATIENT
Start: 2020-12-29

## 2020-12-29 RX ORDER — CLOPIDOGREL BISULFATE 75 MG/1
75 TABLET ORAL DAILY
Qty: 30 TABLET | Refills: 3 | Status: SHIPPED | OUTPATIENT
Start: 2020-12-30

## 2020-12-29 RX ORDER — ASPIRIN 81 MG/1
81 TABLET ORAL DAILY
Qty: 30 TABLET | Refills: 3 | Status: SHIPPED | OUTPATIENT
Start: 2020-12-30

## 2020-12-29 RX ORDER — ATORVASTATIN CALCIUM 80 MG/1
80 TABLET, FILM COATED ORAL NIGHTLY
Status: DISCONTINUED | OUTPATIENT
Start: 2020-12-29 | End: 2020-12-30 | Stop reason: HOSPADM

## 2020-12-29 RX ORDER — NITROGLYCERIN 0.4 MG/1
0.4 TABLET SUBLINGUAL EVERY 5 MIN PRN
Status: DISCONTINUED | OUTPATIENT
Start: 2020-12-29 | End: 2020-12-30 | Stop reason: HOSPADM

## 2020-12-29 RX ORDER — METOPROLOL TARTRATE 75 MG/1
75 TABLET, FILM COATED ORAL 2 TIMES DAILY
Qty: 60 TABLET | Refills: 3 | Status: SHIPPED | OUTPATIENT
Start: 2020-12-29

## 2020-12-29 RX ORDER — DIGOXIN 250 MCG
250 TABLET ORAL DAILY
Qty: 30 TABLET | Refills: 3 | Status: SHIPPED | OUTPATIENT
Start: 2020-12-30

## 2020-12-29 RX ORDER — LISINOPRIL 5 MG/1
5 TABLET ORAL DAILY
Qty: 30 TABLET | Refills: 3 | Status: SHIPPED | OUTPATIENT
Start: 2020-12-30

## 2020-12-29 RX ORDER — NITROGLYCERIN 0.4 MG/1
TABLET SUBLINGUAL
Qty: 25 TABLET | Refills: 3 | Status: SHIPPED | OUTPATIENT
Start: 2020-12-29

## 2020-12-29 RX ADMIN — DIGOXIN 250 MCG: 250 TABLET ORAL at 08:20

## 2020-12-29 RX ADMIN — THERA TABS 1 TABLET: TAB at 08:12

## 2020-12-29 RX ADMIN — FUROSEMIDE 60 MG: 10 INJECTION, SOLUTION INTRAMUSCULAR; INTRAVENOUS at 08:18

## 2020-12-29 RX ADMIN — MAGNESIUM GLUCONATE 500 MG ORAL TABLET 400 MG: 500 TABLET ORAL at 08:15

## 2020-12-29 RX ADMIN — SPIRONOLACTONE 25 MG: 25 TABLET ORAL at 08:12

## 2020-12-29 RX ADMIN — ASPIRIN 81 MG: 81 TABLET, COATED ORAL at 08:20

## 2020-12-29 RX ADMIN — APIXABAN 5 MG: 5 TABLET, FILM COATED ORAL at 17:38

## 2020-12-29 RX ADMIN — METOPROLOL TARTRATE 75 MG: 50 TABLET, FILM COATED ORAL at 08:13

## 2020-12-29 RX ADMIN — LEVOTHYROXINE SODIUM 50 MCG: 50 TABLET ORAL at 06:03

## 2020-12-29 RX ADMIN — Medication 100 MG: at 08:12

## 2020-12-29 RX ADMIN — DOCUSATE SODIUM 100 MG: 100 CAPSULE, LIQUID FILLED ORAL at 20:02

## 2020-12-29 RX ADMIN — DOCUSATE SODIUM 100 MG: 100 CAPSULE, LIQUID FILLED ORAL at 08:19

## 2020-12-29 RX ADMIN — LISINOPRIL 5 MG: 10 TABLET ORAL at 08:16

## 2020-12-29 RX ADMIN — FUROSEMIDE 40 MG: 40 TABLET ORAL at 17:39

## 2020-12-29 RX ADMIN — ATORVASTATIN CALCIUM 80 MG: 80 TABLET, FILM COATED ORAL at 20:02

## 2020-12-29 RX ADMIN — AMIODARONE HYDROCHLORIDE 200 MG: 200 TABLET ORAL at 08:21

## 2020-12-29 RX ADMIN — CLOPIDOGREL 75 MG: 75 TABLET, FILM COATED ORAL at 08:20

## 2020-12-29 RX ADMIN — ENOXAPARIN SODIUM 180 MG: 100 INJECTION SUBCUTANEOUS at 08:22

## 2020-12-29 ASSESSMENT — PAIN SCALES - GENERAL
PAINLEVEL_OUTOF10: 0
PAINLEVEL_OUTOF10: 0

## 2020-12-29 NOTE — PROGRESS NOTES
Patient returned from xray and is currently in bathroom washing self up. Carina Leal did offer assistance as well as his morning meds and patient still refused meds.

## 2020-12-29 NOTE — PROGRESS NOTES
Writer bedside for AM care and medication administration. Patient is alert and oriented *4, patient states\" he don't feel well,but can't explain. \" Vital signs are stable , patient doesn't appear in any distress , writer did attempt to give medication and patient refused at this time and said \"later. \"

## 2020-12-29 NOTE — PROGRESS NOTES
[facility]   Progress Note                   Date:   12/29/2020  Patient name: Janie Ybarra  Date of admission:  12/21/2020  2:39 PM  MRN:   [id number]  YOB: 1965  PCP: No primary care provider on file. Reason for Admission: New onset of congestive heart failure ([facility] [address] [I50.9]    Subjective:       Clinical Changes / Abnormalities: Pt seen and examined in the room after discussion with RN. Pt denies any CP or SOB presently. Improved LE edema. S/p NEELIMA/CV and CATH 12/28/2020. SR on tele  HR 70    Discussed in detail with patient post cath POC including but not limited to medications, diet, exercise, right radial artery site care, and follow-up. Questions and concerns addressed. OK for discharge home today. F/U in office in 2  weeks. Medications:   Scheduled Meds:   enoxaparin  1.5 mg/kg Subcutaneous Daily    aspirin  81 mg Oral Daily    clopidogrel  75 mg Oral Daily    furosemide  60 mg Intravenous BID    amiodarone  200 mg Oral Daily    spironolactone  25 mg Oral Daily    magnesium oxide  400 mg Oral Daily    metoprolol tartrate  75 mg Oral BID    digoxin  250 mcg Oral Daily    thiamine  100 mg Oral Daily    multivitamin  1 tablet Oral Daily    docusate sodium  100 mg Oral BID    lisinopril  5 mg Oral Daily    levothyroxine  50 mcg Oral Daily     Continuous Infusions:    CBC:   Recent Labs     12/26/20  2347 12/28/20  0512 12/29/20  0531   WBC 4.9 4.4 4.5   HGB 12.7* 13.4 12.9*   PLT See Reflexed IPF Result 159 See Reflexed IPF Result     BMP:    Recent Labs     12/26/20  2347 12/28/20  0512 12/29/20  0531    139 139   K 3.7 4.0 3.8   CL 95* 94* 97*   CO2 30 31 30   BUN 16 18 16   CREATININE 1.13 1.03 0.95   GLUCOSE 108* 109* 99     Hepatic:   No results for input(s): AST, ALT, ALB, BILITOT, ALKPHOS in the last 72 hours. Troponin:   No results for input(s): TROPHS in the last 72 hours. BNP: No results for input(s): BNP in the last 72 hours.   Lipids:   No results for input(s): CHOL, HDL in the last 72 hours. Invalid input(s): LDLCALCU  INR:   No results for input(s): INR in the last 72 hours. DIAGNOSTIC DATA  CATH 12/28/2020  Findings:      LMCA: Normal 0% stenosis. LAD: Diffuse irregularities 30-40%. LCx: Single stenosis.       Lesion on Mid CX: reduced to 0%. Post Procedure IVETTE III flow was present.     The lesion was diagnosed as Moderate Risk (B).       Devices used         - Luge Wire 182 cm. Number of passes: 1.         - Xience Cyndee 3.0 x 12 TRENTON. 1 inflation(s) to a max pressure of: 12     felicita.       RCA: Diffuse irregularities 30-40%.      Coronary Tree        Dominance: Right      The LV gram was performed in the ABEBE 30 position. LVEF: 50%.    Estimated Blood Loss: 5 mL      Conclusions        Procedure Summary        Successful PCI / TRENTON of mid LCX.    Non-obstructive LAD and RCA disease.    Borderline LV systolic function.        Recommendations        Routine Post Stent Orders.    Medical therapy as needed.    Risk factor modification. NEELIMA/CV 12/28/2020  NEELIMA:     Structures:     LA:       Normal  TAIWO:    No thrombus  RA:      Normal  RV:      Normal  LV:       Normal  Estimated LVEF: 45%  Aorta:               No significant atheromatous disease  Pericardium:    No pericardial effusion  Septum:           No intracardiac shunt via color Doppler.        Valves:     Mitral Valve:    Structurally normal. Mild regurgitation is identified with multiple small jets. Aortic Valve: The aortic valve is trileaflet and opens adequately. Trivial regurgitiation is identified. Tricuspid valve: Structurally normal. Mild regurgitation is identified. Pulmonary valve: Normal. No significant regurgitation     No valvular vegetations or thrombus identified.        NEELIMA Summary:      1. A NEELIMA was performed without complications. 2. LVEF 45%  3. No thrombus or valvular vegetation identified  4.  Proceed with Cardioversion           CARDIOVERSION:     After an adequate level of sedation was achieved, 200J in biphasic synchronized delivery was administered. conversion to normal sinus rhythm. Cardioversion was successful. The patient awoke without complications.     There were no complications encountered.     The patient will continue with the discharge meds and has been instructed to follow-up with Cardiologist for continued long term care and cardiovascular management. ECHO 12/22/20  Summary  Left ventricle is normal in size with Moderately reduced systolic function,  visually estimated LVEF 35-40%. Evidence of diastolic dysfunction. Left atrium is moderately dilated. Right atrial enlargement. Aortic sclerosis with mild focal calcification. Mild mitral regurgitation. Mild tricuspid regurgitation. Estimated right ventricular systolic pressure  is 29 mmHg. Objective:   Vitals: /89   Pulse 86   Temp 97.8 °F (36.6 °C) (Axillary)   Resp 21   Ht 5' 11\" (1.803 m)   Wt 266 lb 12.1 oz (121 kg)   SpO2 (!) 88%   BMI 37.21 kg/m²   General appearance: alert and cooperative with exam  HEENT: Head: Normocephalic, no lesions, without obvious abnormality. Neck: no JVD, trachea midline, no adenopathy  Lungs: Diminished  to auscultation throughout. NO rales. On RA  Heart: irregular rate and rhythm, s1/s2 auscultated, no murmurs, SR on tele HR 70  Abdomen: distended. Firm   Extremities: +1 bilateral  Edema, right > left - much improving. Continued significant penial/scrotal edema  Neurologic: not done    Coronary Discharge Core Measure: Please indicate the medication given by X, and if not the reasons not given:    Not Given Reason  Given      Beta Blockers X      ACE-I X      Statins X      ASA X     Plavix script given and escript to pharmacy OAP (Plavix/Effient/Brilinta) X    SL Nitro X           Assessment / Acute Cardiac Problems:   1. Generalized anasarca   2.  New onset atrial fibrillation with RVR CHADSVASC

## 2020-12-29 NOTE — PROGRESS NOTES
[address]  Occupational Therapy Not Seen Note    DATE: 2020  Name: Vickey Brittle  : 1965  MRN: [id number]    Patient not available for Occupational Therapy due to:    [] Testing:    [] Hemodialysis    [] Blood Transfusion in Progress    []Refusal by Patient:    [] Surgery/Procedure:    [] Strict Bedrest    [] Sedation    [] Spine Precautions     [] Pt being transferred to palliative care at this time. Spoke with pt/family and OT services to be defered. [x] Pt independent with functional mobility and functional tasks. Pt with no OT acute care needs at this time, will defer OT eval.  Collaborated with pt who reports no concerns with safely returning home upon discharge. Pt observed completing ADLs independently in bathroom.       [] Ana JARA

## 2020-12-29 NOTE — PROGRESS NOTES
[facility]  Office: [address], DO, Pierce Stephen, DO, Sarahi London, DO, Betsey Clark, DO, Robinson Amin MD, Craig Espinosa MD, Woody Solares MD, Alfredo Parra MD, Roya Brooke MD, Jose Gaines MD, Juni Negron MD, Gerson Loza MD, Troy Chandler MD, Kathi Gordillo DO, Marj Jeffers MD, Tori Torres MD, Duong Wynn, DO, Gi Carolina MD,  Aminta Stern DO, Isabelle Morales MD, Brandon Loyola MD, Kina Maria, [facility], [facility], CNP, Hugo Colindres, CNP, Rusty Herrera, CNS, Lathan Pallas, CNP, Krystian Waller, CNP, [place], CNP, Jalyn Thomas, CNP, Paula Mariano, CNP, Diallo Everett PA-C, Wally Allan, [facility], Rocky Florentino, CNP, Tegan Alvarez, CNP, Lilliana Malagon, CNP, Celine Vivar, CNP, Moises Mann, [facility]   [address] [facility]    Progress Note    12/29/2020    10:04 AM    Name:   Ashish Cespedes  MRN:     [id number]     Acct:      [de-identified]   Room:   [address] [place] [address] Day:  8  Admit Date:  12/21/2020  2:39 PM    PCP:   No primary care provider on file. Code Status:  Full Code    Subjective:     C/C:   Chief Complaint   Patient presents with    Chest Pain     new onset a-fib with rvr      Interval History Status: not changed. Pt seen and examined this morning  Resting comfortably in bed at this time   States that he feels unwell but is unable to specify why   No specific complaints at this time   Denies any chest pain, nausea, vomiting, dyspnea        Review of Systems:     12 point ROS performed today and negative for anythign other than what was stated in subjective     Medications: Allergies:     Allergies   Allergen Reactions    Seasonal        Current Meds:   Scheduled Meds:    enoxaparin  1.5 mg/kg Subcutaneous Daily    aspirin  81 mg Oral Daily    clopidogrel  75 mg Oral Daily    furosemide  60 mg Intravenous BID    amiodarone  200 mg Oral Daily    spironolactone  25 mg Oral Daily    magnesium oxide  400 mg Oral Daily    metoprolol tartrate  75 mg Oral BID    digoxin  250 mcg Oral Daily    thiamine  100 mg Oral Daily    multivitamin  1 tablet Oral Daily    docusate sodium  100 mg Oral BID    lisinopril  5 mg Oral Daily    levothyroxine  50 mcg Oral Daily     Continuous Infusions:   PRN Meds: potassium chloride **OR** potassium alternative oral replacement **OR** potassium chloride, bisacodyl, promethazine **OR** ondansetron, polyethylene glycol, acetaminophen **OR** acetaminophen    Data:     Past Medical History:   has a past medical history of Anasarca, Elevated troponin, Hypothyroidism, New onset atrial fibrillation ([facility] and New onset of congestive heart failure ([facility] Social History:   reports that he has never smoked. His smokeless tobacco use includes chew. He reports current alcohol use of about 30.0 standard drinks of alcohol per week. He reports that he does not use drugs. Family History:   Family History   Problem Relation Age of Onset    Atrial Fibrillation Mother     Stroke Father        Vitals:  BP (!) 116/59   Pulse 86   Temp 97 °F (36.1 °C) (Axillary)   Resp 21   Ht 5' 11\" (1.803 m)   Wt 266 lb 12.1 oz (121 kg)   SpO2 (!) 88%   BMI 37.21 kg/m²   Temp (24hrs), Av °F (36.7 °C), Min:97 °F (36.1 °C), Max:98.6 °F (37 °C)    No results for input(s): POCGLU in the last 72 hours. I/O (24Hr):     Intake/Output Summary (Last 24 hours) at 2020 1004  Last data filed at 2020 0600  Gross per 24 hour   Intake 370 ml   Output 2750 ml   Net -2380 ml       Labs:  Hematology:  Recent Labs     20  2347 20  0512 20  0531   WBC 4.9 4.4 4.5   RBC 4.13* 4.27 4.20*   HGB 12.7* 13.4 12.9*   HCT 41.7 43.7 42.6   .0 102.3 101.4   MCH 30.8 31.4 30.7   MCHC 30.5 30.7 30.3   RDW 12.7 12.8 12.5   PLT See Reflexed IPF Result 159 See Reflexed IPF Result   MPV NOT REPORTED 12.7 NOT REPORTED     Chemistry:  Recent Labs     20  7554 12/28/20  0512 12/29/20  0531    139 139   K 3.7 4.0 3.8   CL 95* 94* 97*   CO2 30 31 30   GLUCOSE 108* 109* 99   BUN 16 18 16   CREATININE 1.13 1.03 0.95   MG 1.9 2.0 1.9   ANIONGAP 12 14 12   LABGLOM >60 >60 >60   GFRAA >60 >60 >60   CALCIUM 8.9 9.3 9.2   PHOS 3.5 3.8 3.5     Recent Labs     12/26/20  2347 12/28/20  0512 12/29/20  0531   LABALBU 3.1* 3.4* 3.3*     ABG:No results found for: POCPH, PHART, PH, POCPCO2, IEM2MWZ, PCO2, POCPO2, PO2ART, PO2, POCHCO3, DHC2YFX, HCO3, NBEA, PBEA, BEART, BE, THGBART, THB, RKP7VHM, RTCU2MFS, S6QMCWPW, O2SAT, FIO2  Lab Results   Component Value Date/Time    SPECIAL NOT REPORTED 12/24/2020 09:29 AM     Lab Results   Component Value Date/Time    CULTURE NO GROWTH 5 DAYS 12/24/2020 09:29 AM       Radiology:  Us Thoracentesis Which Side Should The Procedure Be Performed? Right    Result Date: 12/24/2020  Successful ultrasound guided thoracentesis. Us Retroperitoneal Complete    Result Date: 12/23/2020  Unremarkable ultrasound of the kidneys and urinary bladder. Moderate ascites. Physical Examination:        General appearance:  alert, cooperative , NAD  Mental Status:  oriented to person, place and time and normal affect  Lungs: Coarse auscultation in upper and lower lobes bilaterally with diminished breath sounds in the right lung field    Heart:  RRR  Abdomen:  Distended, improving compared to 12/22, bowel sounds present. No hepatomegaly  Extremities:  +2 pitting edema, redness, tenderness in the calves. +scrotal edema  Skin:  no gross lesions, rashes, induration    Assessment:        Hospital Problems           Last Modified POA    * (Principal) Acute on chronic systolic congestive heart failure ([address] 12/22/2020 Yes    New onset of congestive heart failure ([address] 12/22/2020 Yes    New onset a-fib ([address] 12/21/2020 Yes    Other specified hypothyroidism 12/21/2020 Yes    Elevated troponin 12/21/2020 Yes    Elevated serum creatinine 12/21/2020 Yes    Class 3 severe obesity due to excess calories with body mass index (BMI) of 40.0 to 44.9 in adult [facility]) 12/21/2020 Yes    Edema 12/21/2020 Yes    Constipation 12/21/2020 Yes    Anasarca 12/21/2020 Yes    TSH elevation 12/21/2020 Yes    Hyperkalemia 12/21/2020 Yes    Atrial fibrillation, rapid ([address] 12/21/2020 Yes    Acute hypokalemia 12/25/2020 Yes          Plan:        1. Acute exacerbation of newly diagnosed HFrEF  2. NSTEM  Type II    - Echo 12/21 EF: 35-40%. - currently on Lasix  60 BID. - Strict I/O. Net negative 21L.   - Keep potassium>4, magnesium >2.   - 1200 cc fluid restriction. Monitor Scr. 2 gram salt restriction. - underwent cardiac cath on 12/28 and had PCI/TRENTON to the mid LCX   - cardiology on board       2. Atrial fibrillation with RVR:   - s/p NEELIMA cardioversion on 12/28  - cardiology on board   - currenlty on lopressor, amiodarone and digoxin   - lovenox for anti coagulation   - telemetry     3. Acute hypokalemia:   - resolved     4. Subclinical Hypothyroidism:   - TSH: 12.6.   - Started on  low dose synthroid 50 ug.   - Will need continued outpatient follow up     5. Large pleural effusion:   - Likely 2/2 above  - diagnostic/theraputic thoracentesis 12/24 1.2 L removed  - Transudate by lights criteria   - will repeat chest xray today as the patients oxygen saturation noted to be 88 on room air with decreased breath sounds on exam     6. ETOH abuse:   - Without symptoms of withdrawal.   - continue CIWA Thiamine, folate supplementation.   - Continue daily MVI. 7. Elevated Bilirubin: improving.    8. Obesity with BMI of 44.6      Sofi Maxwell MD  12/29/2020  10:04 AM

## 2020-12-30 VITALS
HEART RATE: 86 BPM | HEIGHT: 71 IN | BODY MASS INDEX: 36.79 KG/M2 | DIASTOLIC BLOOD PRESSURE: 82 MMHG | OXYGEN SATURATION: 96 % | WEIGHT: 262.79 LBS | RESPIRATION RATE: 19 BRPM | SYSTOLIC BLOOD PRESSURE: 131 MMHG | TEMPERATURE: 97.5 F

## 2020-12-30 LAB
ABSOLUTE EOS #: 0.31 K/UL (ref 0–0.44)
ABSOLUTE IMMATURE GRANULOCYTE: <0.03 K/UL (ref 0–0.3)
ABSOLUTE LYMPH #: 0.79 K/UL (ref 1.1–3.7)
ABSOLUTE MONO #: 0.59 K/UL (ref 0.1–1.2)
ALBUMIN SERPL-MCNC: 3.7 G/DL (ref 3.5–5.2)
ANION GAP SERPL CALCULATED.3IONS-SCNC: 14 MMOL/L (ref 9–17)
BASOPHILS # BLD: 1 % (ref 0–2)
BASOPHILS ABSOLUTE: 0.05 K/UL (ref 0–0.2)
BUN BLDV-MCNC: 15 MG/DL (ref 6–20)
BUN/CREAT BLD: ABNORMAL (ref 9–20)
CALCIUM SERPL-MCNC: 9.7 MG/DL (ref 8.6–10.4)
CHLORIDE BLD-SCNC: 97 MMOL/L (ref 98–107)
CO2: 26 MMOL/L (ref 20–31)
CREAT SERPL-MCNC: 0.99 MG/DL (ref 0.7–1.2)
CULTURE: ABNORMAL
DIFFERENTIAL TYPE: ABNORMAL
DIRECT EXAM: ABNORMAL
DIRECT EXAM: ABNORMAL
EOSINOPHILS RELATIVE PERCENT: 5 % (ref 1–4)
GFR AFRICAN AMERICAN: >60 ML/MIN
GFR NON-AFRICAN AMERICAN: >60 ML/MIN
GFR SERPL CREATININE-BSD FRML MDRD: ABNORMAL ML/MIN/{1.73_M2}
GFR SERPL CREATININE-BSD FRML MDRD: ABNORMAL ML/MIN/{1.73_M2}
GLUCOSE BLD-MCNC: 114 MG/DL (ref 70–99)
HCT VFR BLD CALC: 45.5 % (ref 40.7–50.3)
HEMOGLOBIN: 14 G/DL (ref 13–17)
IMMATURE GRANULOCYTES: 0 %
LYMPHOCYTES # BLD: 14 % (ref 24–43)
Lab: ABNORMAL
MAGNESIUM: 2.1 MG/DL (ref 1.6–2.6)
MCH RBC QN AUTO: 30.8 PG (ref 25.2–33.5)
MCHC RBC AUTO-ENTMCNC: 30.8 G/DL (ref 28.4–34.8)
MCV RBC AUTO: 100.2 FL (ref 82.6–102.9)
MONOCYTES # BLD: 10 % (ref 3–12)
NRBC AUTOMATED: 0 PER 100 WBC
PDW BLD-RTO: 12.5 % (ref 11.8–14.4)
PHOSPHORUS: 3.2 MG/DL (ref 2.5–4.5)
PLATELET # BLD: 160 K/UL (ref 138–453)
PLATELET ESTIMATE: ABNORMAL
PMV BLD AUTO: 11.9 FL (ref 8.1–13.5)
POTASSIUM SERPL-SCNC: 3.8 MMOL/L (ref 3.7–5.3)
RBC # BLD: 4.54 M/UL (ref 4.21–5.77)
RBC # BLD: ABNORMAL 10*6/UL
SEG NEUTROPHILS: 70 % (ref 36–65)
SEGMENTED NEUTROPHILS ABSOLUTE COUNT: 3.96 K/UL (ref 1.5–8.1)
SODIUM BLD-SCNC: 137 MMOL/L (ref 135–144)
SPECIMEN DESCRIPTION: ABNORMAL
WBC # BLD: 5.7 K/UL (ref 3.5–11.3)
WBC # BLD: ABNORMAL 10*3/UL

## 2020-12-30 PROCEDURE — 36415 COLL VENOUS BLD VENIPUNCTURE: CPT

## 2020-12-30 PROCEDURE — 6370000000 HC RX 637 (ALT 250 FOR IP): Performed by: NURSE PRACTITIONER

## 2020-12-30 PROCEDURE — 6370000000 HC RX 637 (ALT 250 FOR IP): Performed by: INTERNAL MEDICINE

## 2020-12-30 PROCEDURE — 83735 ASSAY OF MAGNESIUM: CPT

## 2020-12-30 PROCEDURE — 85025 COMPLETE CBC W/AUTO DIFF WBC: CPT

## 2020-12-30 PROCEDURE — 6370000000 HC RX 637 (ALT 250 FOR IP): Performed by: STUDENT IN AN ORGANIZED HEALTH CARE EDUCATION/TRAINING PROGRAM

## 2020-12-30 PROCEDURE — 99239 HOSP IP/OBS DSCHRG MGMT >30: CPT | Performed by: FAMILY MEDICINE

## 2020-12-30 PROCEDURE — 80069 RENAL FUNCTION PANEL: CPT

## 2020-12-30 RX ORDER — MULTIVITAMIN WITH IRON
1 TABLET ORAL DAILY
Qty: 30 TABLET | Refills: 0 | Status: SHIPPED | OUTPATIENT
Start: 2020-12-31

## 2020-12-30 RX ORDER — POTASSIUM CHLORIDE 20 MEQ/1
10 TABLET, EXTENDED RELEASE ORAL DAILY
Qty: 90 TABLET | Refills: 1 | Status: SHIPPED | OUTPATIENT
Start: 2020-12-30

## 2020-12-30 RX ORDER — LEVOTHYROXINE SODIUM 0.05 MG/1
50 TABLET ORAL DAILY
Qty: 30 TABLET | Refills: 3 | Status: SHIPPED | OUTPATIENT
Start: 2020-12-31

## 2020-12-30 RX ADMIN — SPIRONOLACTONE 25 MG: 25 TABLET ORAL at 09:25

## 2020-12-30 RX ADMIN — CLOPIDOGREL 75 MG: 75 TABLET, FILM COATED ORAL at 09:26

## 2020-12-30 RX ADMIN — METOPROLOL TARTRATE 75 MG: 50 TABLET, FILM COATED ORAL at 09:26

## 2020-12-30 RX ADMIN — LEVOTHYROXINE SODIUM 50 MCG: 50 TABLET ORAL at 06:37

## 2020-12-30 RX ADMIN — LISINOPRIL 5 MG: 10 TABLET ORAL at 09:27

## 2020-12-30 RX ADMIN — THERA TABS 1 TABLET: TAB at 09:25

## 2020-12-30 RX ADMIN — MAGNESIUM GLUCONATE 500 MG ORAL TABLET 400 MG: 500 TABLET ORAL at 09:25

## 2020-12-30 RX ADMIN — FUROSEMIDE 40 MG: 40 TABLET ORAL at 09:25

## 2020-12-30 RX ADMIN — DIGOXIN 250 MCG: 250 TABLET ORAL at 09:26

## 2020-12-30 RX ADMIN — APIXABAN 5 MG: 5 TABLET, FILM COATED ORAL at 06:36

## 2020-12-30 RX ADMIN — Medication 100 MG: at 09:25

## 2020-12-30 RX ADMIN — DOCUSATE SODIUM 100 MG: 100 CAPSULE, LIQUID FILLED ORAL at 09:25

## 2020-12-30 RX ADMIN — ASPIRIN 81 MG: 81 TABLET, COATED ORAL at 09:25

## 2020-12-30 RX ADMIN — AMIODARONE HYDROCHLORIDE 200 MG: 200 TABLET ORAL at 09:26

## 2020-12-30 ASSESSMENT — PAIN SCALES - GENERAL: PAINLEVEL_OUTOF10: 0

## 2020-12-30 NOTE — PROGRESS NOTES
Home Oxygen Evaluation    Home Oxygen Evaluation completed. Patient is on room air. Resting SpO2 = 93%    SpO2 on room air with exercise = 90%    Nocturnal Oximetry with patient on room air is recommended is SpO2 is between 89% and 95% (requires additional order).     Korina Cardenas  9:05 AM

## 2020-12-30 NOTE — DISCHARGE SUMMARY
[facility]  Office: [address], DO, Flavia Dunn DO, Richard Gregory DO, [place] DO, Penelope Iverson MD, Angie Moreno MD, Cheo Narvaez MD, Vahe Russell MD, Maxwell Timmons MD, Devan Funk MD, Deo Landers MD, Eulalia Aranda MD, Troy Schulte MD, Tamika Dinh, DO, Khadar Aburto MD, Joaquin Tran MD, Terra Poole DO, Sherif uMrphy MD,  Tyrell Rivas DO, Uche Hawkins MD, Saurabh Roberson MD, Ami Escobar, [facility], [facility], CNP, [facility] Darlene, CNP, Gavin Bee, CNS, Duane Bottcher, CNP, Dae Bzaan, CNP, Tyrone Gambino, CNP, Kirk Joseph, CNP, Archana Garza, CNP, Davey Bee PA-C, Dearl Chanelle, [facility], Matilda Banerjee, CNP, Jacqueline Antonio, CNP, Vladislav Swan, CNP, Skyler Adler, CNP, Edward Lazaro, [facility]   [address] [facility]    Discharge Summary     Patient ID: Wendel Ahumada  :  1965   MRN: [id number]     ACCOUNT:  [de-identified]   Patient's PCP: No primary care provider on file. Admit Date: 2020   Discharge Date: 2020      Length of Stay: 9  Code Status:  Full Code  Admitting Physician: Uche Hawkins MD  Discharge Physician: Uche Hawkins MD     Active Discharge Diagnoses:     Hospital Problem Lists:  Principal Problem:    Acute on chronic systolic congestive heart failure ([address]  Active Problems:    New onset of congestive heart failure ([address]    New onset a-fib ([address]    Other specified hypothyroidism    Elevated troponin    Elevated serum creatinine    Class 3 severe obesity due to excess calories with body mass index (BMI) of 40.0 to 44.9 in adult (HCC)    Edema    Constipation    Anasarca    TSH elevation    Hyperkalemia    Atrial fibrillation, rapid ([address]    Acute hypokalemia  Resolved Problems:    * No resolved hospital problems.  *      Admission Condition:  stable     Discharged Condition: stable    Hospital Stay:     Hospital Course:  Wendel Ahumada is a 54 y.o. male who was 114 12/30/2020     12/30/2020    K 3.8 12/30/2020    CL 97 12/30/2020    CO2 26 12/30/2020    ANIONGAP 14 12/30/2020    BUN 15 12/30/2020    CREATININE 0.99 12/30/2020    BUNCRER NOT REPORTED 12/30/2020    CALCIUM 9.7 12/30/2020    LABGLOM >60 12/30/2020    GFRAA >60 12/30/2020    GFR      12/30/2020    GFR NOT REPORTED 12/30/2020     HFP:    Lab Results   Component Value Date    PROT 6.3 12/23/2020     CMP:    Lab Results   Component Value Date    GLUCOSE 114 12/30/2020     12/30/2020    K 3.8 12/30/2020    CL 97 12/30/2020    CO2 26 12/30/2020    BUN 15 12/30/2020    CREATININE 0.99 12/30/2020    ANIONGAP 14 12/30/2020    ALKPHOS 108 12/22/2020    ALT 18 12/22/2020    AST 27 12/22/2020    BILITOT 1.78 12/22/2020    LABALBU 3.7 12/30/2020    ALBUMIN 1.1 12/22/2020    LABGLOM >60 12/30/2020    GFRAA >60 12/30/2020    GFR      12/30/2020    GFR NOT REPORTED 12/30/2020    PROT 6.3 12/23/2020    CALCIUM 9.7 12/30/2020     PT/INR:    Lab Results   Component Value Date    PROTIME 13.5 12/23/2020    INR 1.3 12/23/2020     PTT:   Lab Results   Component Value Date    APTT 106.4 12/27/2020     FLP:    Lab Results   Component Value Date    CHOL 126 12/22/2020    TRIG 88 12/22/2020    HDL 39 12/22/2020     U/A:    Lab Results   Component Value Date    COLORU YELLOW 12/22/2020    TURBIDITY CLEAR 12/22/2020    SPECGRAV 1.008 12/22/2020    HGBUR NEGATIVE 12/22/2020    PHUR 5.0 12/22/2020    PROTEINU NEGATIVE 12/22/2020    GLUCOSEU NEGATIVE 12/22/2020    KETUA NEGATIVE 12/22/2020    BILIRUBINUR NEGATIVE 12/22/2020    UROBILINOGEN Normal 12/22/2020    NITRU NEGATIVE 12/22/2020    LEUKOCYTESUR NEGATIVE 12/22/2020     TSH:    Lab Results   Component Value Date    TSH 12.62 12/21/2020         Radiology:  Xr Chest (2 Vw)    Result Date: 12/29/2020  Right pleural effusion which is much decreased when compared to the previous study of December 22, 2020.  Suggestion of small infiltrate versus interstitial fluid in the right lower lung field. Us Thoracentesis Which Side Should The Procedure Be Performed? Right    Result Date: 12/24/2020  Successful ultrasound guided thoracentesis. Consultations:    Consults:     Final Specialist Recommendations/Findings:   IP CONSULT TO CARDIOLOGY  IP CONSULT TO HOSPITALIST  IP CONSULT TO HEART FAILURE NURSE/COORDINATOR  IP CONSULT TO DIETITIAN  IP CONSULT TO SOCIAL WORK      The patient was seen and examined on day of discharge and this discharge summary is in conjunction with any daily progress note from day of discharge.     General appearance:  alert, cooperative , NAD  Mental Status:  oriented to person, place and time and normal affect  Lungs: CTAB   Heart:  RRR  Abdomen:  NON Distended,No hepatomegaly  Skin:  no gross lesions, rashes, induration    Discharge plan:     Disposition: Home    Physician Follow Up:      Lauren Caba, APRN - CNP  [address] [address] [address]  [address]  [address] [facility]    On 1/11/2021  at 9:45 am f/u cath with TRENTON and NEELIMA/CV A Fib       Requiring Further Evaluation/Follow Up POST HOSPITALIZATION/Incidental Findings:      Diet: cardiac diet    Activity: As tolerated     Instructions to Patient:      Discharge Medications:      Medication List      START taking these medications    amiodarone 200 MG tablet  Commonly known as: CORDARONE  Take 1 tablet by mouth daily     apixaban 5 MG Tabs tablet  Commonly known as: Eliquis  Take 1 tablet by mouth 2 times daily     aspirin 81 MG EC tablet  Take 1 tablet by mouth daily     atorvastatin 80 MG tablet  Commonly known as: LIPITOR  Take 1 tablet by mouth nightly     clopidogrel 75 MG tablet  Commonly known as: PLAVIX  Take 1 tablet by mouth daily     digoxin 250 MCG tablet  Commonly known as: LANOXIN  Take 1 tablet by mouth daily     furosemide 40 MG tablet  Commonly known as: LASIX  Take 1 tablet by mouth 2 times daily     levothyroxine 50 MCG tablet  Commonly known as: SYNTHROID  Take 1 tablet by mouth Daily  Start taking on: December 31, 2020     lisinopril 5 MG tablet  Commonly known as: PRINIVIL;ZESTRIL  Take 1 tablet by mouth daily     Metoprolol Tartrate 75 MG Tabs  Take 75 mg by mouth 2 times daily     multivitamin Tabs tablet  Take 1 tablet by mouth daily  Start taking on: December 31, 2020     nitroGLYCERIN 0.4 MG SL tablet  Commonly known as: NITROSTAT  up to max of 3 total doses. If no relief after 1 dose, call 911. potassium chloride 20 MEQ extended release tablet  Commonly known as: KLOR-CON M  Take 0.5 tablets by mouth daily        CONTINUE taking these medications    docusate sodium 100 MG capsule  Commonly known as: COLACE           Where to Get Your Medications      These medications were sent to [facility] [address] [facility], [address], [address]  [address]    Phone: [phone]   · amiodarone 200 MG tablet  · apixaban 5 MG Tabs tablet  · aspirin 81 MG EC tablet  · atorvastatin 80 MG tablet  · clopidogrel 75 MG tablet  · digoxin 250 MCG tablet  · furosemide 40 MG tablet  · levothyroxine 50 MCG tablet  · lisinopril 5 MG tablet  · Metoprolol Tartrate 75 MG Tabs  · multivitamin Tabs tablet  · nitroGLYCERIN 0.4 MG SL tablet  · potassium chloride 20 MEQ extended release tablet         No discharge procedures on file. Time Spent on discharge is  36 mins in patient examination, evaluation, counseling as well as medication reconciliation, prescriptions for required medications, discharge plan and follow up. Electronically signed by   Isabelle Morales MD  12/30/2020  9:56 AM      Thank you Dr. Mishra primary care provider on file. for the opportunity to be involved in this patient's care.

## 2020-12-30 NOTE — DISCHARGE INSTR - COC
Continuity of Care Form    Patient Name: Bennett Fonseca   :  1965  MRN:  [id number]    Admit date:  2020  Discharge date:  ***    Code Status Order: Full Code   Advance Directives:   Advance Care Flowsheet Documentation       Date/Time Healthcare Directive Type of Healthcare Directive Copy in [address] Agent's Name Healthcare Agent's Phone Number    20 [phone]  No, patient does not have an advance directive for healthcare treatment -- -- -- -- --            Admitting Physician:  Abel Chandler MD  PCP: No primary care provider on file. Discharging Nurse: [facility] Unit/Room#: 0401/0401-01  Discharging Unit Phone Number: ***    Emergency Contact:   Extended Emergency Contact Information  Primary Emergency Contact: none, no  Home Phone: [phone]  Relation: Other  Secondary Emergency Contact: Quin Cheng  Mobile Phone: [phone]  Relation: Other    Past Surgical History:  Past Surgical History:   Procedure Laterality Date    CARDIAC CATHETERIZATION  2020    CARDIOVERSION  2020    ELBOW SURGERY      TRANSESOPHAGEAL ECHOCARDIOGRAM  2020       Immunization History: There is no immunization history on file for this patient.     Active Problems:  Patient Active Problem List   Diagnosis Code    New onset of congestive heart failure (HCC) I50.9    New onset a-fib ([facility] I48.91    Other specified hypothyroidism E03.8    Elevated troponin R77.8    Elevated serum creatinine R79.89    Class 3 severe obesity due to excess calories with body mass index (BMI) of 40.0 to 44.9 in adult ([facility] E66.01, Z68.41    Edema R60.9    Constipation K59.00    Anasarca R60.1    TSH elevation R79.89    Hyperkalemia E87.5    Atrial fibrillation, rapid (HCC) I48.91    Atrial fibrillation (HCC) I48.91    Acute on chronic systolic congestive heart failure (HCC) I50.23    Acute hypokalemia E87.6       Isolation/Infection:   Isolation            No Isolation Patient Infection Status       None to display            Nurse Assessment:  Last Vital Signs: /82   Pulse 81   Temp 98.6 °F (37 °C) (Oral)   Resp 22   Ht 5' 11\" (1.803 m)   Wt 262 lb 12.6 oz (119.2 kg)   SpO2 97%   BMI 36.65 kg/m²     Last documented pain score (0-10 scale): Pain Level: 0  Last Weight:   Wt Readings from Last 1 Encounters:   12/30/20 262 lb 12.6 oz (119.2 kg)     Mental Status:  {IP PT MENTAL STATUS:20030:::0}    IV Access:  { ZAHRAA IV ACCESS:007896492:::0}    Nursing Mobility/ADLs:  Walking   {CHP DME ADLs:109888183:::0}  Transfer  {CHP DME ADLs:171816471:::0}  Bathing  {CHP DME ADLs:038974690:::0}  Dressing  {CHP DME ADLs:089306280:::0}  Toileting  {CHP DME ADLs:074651604:::0}  Feeding  {CHP DME ADLs:079182398:::0}  Med Admin  {P DME ADLs:766885431:::0}  Med Delivery   { ZAHRAA MED Delivery:036597929:::0}    Wound Care Documentation and Therapy:        Elimination:  Continence: Bowel: {YES / NB:11255}  Bladder: {YES / CP:10462}  Urinary Catheter: {Urinary Catheter:734879557:::0}   Colostomy/Ileostomy/Ileal Conduit: {YES / YL:65301}       Date of Last BM: ***    Intake/Output Summary (Last 24 hours) at 12/30/2020 0805  Last data filed at 12/30/2020 0459  Gross per 24 hour   Intake 810 ml   Output 1650 ml   Net -840 ml     I/O last 3 completed shifts: In: 80 [P.O.:800;  I.V.:10]  Out: 1650 [Urine:1650]    Safety Concerns:     [address] Safety Concerns:243204003:::0}    Impairments/Disabilities:      { ZAHRAA Impairments/Disabilities:404369926:::0}    Nutrition Therapy:  Current Nutrition Therapy:   [address] [facility] Diet List:808715645:::0}    Routes of Feeding: {CHP DME Other Feedings:161634566:::0}  Liquids: {Slp liquid thickness:09556}  Daily Fluid Restriction: {CHP DME Yes amt example:734179497:::0}  Last Modified Barium Swallow with Video (Video Swallowing Test): {Done Not Done DUJC:640453976:::2}    Treatments at the Time of Hospital Discharge:   Respiratory Treatments: ***  Oxygen Therapy:  {Therapy; copd oxygen:27060:::0}  Ventilator:    {MH CC Vent List:084168302:::0}    Rehab Therapies: {THERAPEUTIC INTERVENTION:5004070644}  Weight Bearing Status/Restrictions: [address] CC Weight Bearin:::0}  Other Medical Equipment (for information only, NOT a DME order):  {EQUIPMENT:466700430}  Other Treatments: ***    Patient's personal belongings (please select all that are sent with patient):  {CHP DME Belongings:710980664:::0}    RN SIGNATURE:  {Esignature:363402225:::0}    CASE MANAGEMENT/SOCIAL WORK SECTION    Inpatient Status Date: ***    Readmission Risk Assessment Score:  Readmission Risk              Risk of Unplanned Readmission:        16           Discharging to Facility/ Agency   Name:   Address:  Phone:  Fax:    Dialysis Facility (if applicable)   Name:  Address:  Dialysis Schedule:  Phone:  Fax:    / signature: {Esignature:701584144:::0}    PHYSICIAN SECTION    Prognosis: Good    Condition at Discharge: Stable    Rehab Potential (if transferring to Rehab): Good    Recommended Labs or Other Treatments After Discharge:      Physician Certification: I certify the above information and transfer of Matt Nayak  is necessary for the continuing treatment of the diagnosis listed and that he requires [address] for less 30 days.      Update Admission H&P: No change in H&P    PHYSICIAN SIGNATURE:  Electronically signed by Sil Pastor MD on 20 at 8:05 AM EST

## 2020-12-30 NOTE — CARE COORDINATION
Discharge order noted. Patient did not qualify for home oxygen. Meds delivered to bedside from pharmacy. PCP list provided.      Discharge [facility] Case Management Department  Written by: Kelby Palomino RN    Patient Name: Angela Thomas  Attending Provider: Nikhil Franco MD  Admit Date: 2020  2:39 PM  MRN: [id number]  Account: [de-identified]                     : 1965  Discharge Date:  20       Disposition: home    Kelby Palomino RN

## 2020-12-30 NOTE — PROGRESS NOTES
CLINICAL PHARMACY NOTE: MEDS TO [address] Select Patient?: No  Total # of Prescriptions Filled: 2   The following medications were delivered to the patient:  · Potassium  · levothyroxine  Total # of Interventions Completed: 0  Time Spent (min): 0    Additional Documentation:

## 2021-01-20 PROBLEM — R79.89 ELEVATED TROPONIN: Status: RESOLVED | Noted: 2020-12-21 | Resolved: 2021-01-20

## 2021-01-20 PROBLEM — R77.8 ELEVATED TROPONIN: Status: RESOLVED | Noted: 2020-12-21 | Resolved: 2021-01-20

## 2021-06-09 ENCOUNTER — HOSPITAL ENCOUNTER (OUTPATIENT)
Age: 56
Discharge: HOME | End: 2021-06-09
Payer: COMMERCIAL

## 2021-06-09 LAB
ANION GAP SERPL CALCULATED.3IONS-SCNC: 11 MMOL/L (ref 9–17)
BUN BLDV-MCNC: 19 MG/DL (ref 6–20)
BUN/CREAT BLD: ABNORMAL (ref 9–20)
CALCIUM SERPL-MCNC: 9.1 MG/DL (ref 8.6–10.4)
CHLORIDE BLD-SCNC: 97 MMOL/L (ref 98–107)
CO2: 24 MMOL/L (ref 20–31)
CREAT SERPL-MCNC: 1.32 MG/DL (ref 0.7–1.2)
GFR AFRICAN AMERICAN: >60 ML/MIN
GFR NON-AFRICAN AMERICAN: 56 ML/MIN
GFR SERPL CREATININE-BSD FRML MDRD: ABNORMAL ML/MIN/{1.73_M2}
GFR SERPL CREATININE-BSD FRML MDRD: ABNORMAL ML/MIN/{1.73_M2}
GLUCOSE BLD-MCNC: 122 MG/DL (ref 70–99)
POTASSIUM SERPL-SCNC: 5.1 MMOL/L (ref 3.7–5.3)
SODIUM BLD-SCNC: 132 MMOL/L (ref 135–144)
TSH SERPL DL<=0.05 MIU/L-ACNC: 9.01 MIU/L (ref 0.3–5)

## 2021-06-09 PROCEDURE — 84443 ASSAY THYROID STIM HORMONE: CPT

## 2021-06-09 PROCEDURE — 36415 COLL VENOUS BLD VENIPUNCTURE: CPT

## 2021-06-09 PROCEDURE — 80048 BASIC METABOLIC PNL TOTAL CA: CPT

## 2023-12-28 ENCOUNTER — APPOINTMENT (OUTPATIENT)
Dept: GENERAL RADIOLOGY | Age: 58
End: 2023-12-28
Payer: COMMERCIAL

## 2023-12-28 ENCOUNTER — HOSPITAL ENCOUNTER (EMERGENCY)
Age: 58
Discharge: LEFT BEFORE DISCHARGE | End: 2023-12-28
Attending: EMERGENCY MEDICINE
Payer: COMMERCIAL

## 2023-12-28 VITALS
OXYGEN SATURATION: 98 % | BODY MASS INDEX: 28.59 KG/M2 | WEIGHT: 205 LBS | TEMPERATURE: 97.7 F | HEART RATE: 63 BPM | SYSTOLIC BLOOD PRESSURE: 164 MMHG | DIASTOLIC BLOOD PRESSURE: 95 MMHG | RESPIRATION RATE: 18 BRPM

## 2023-12-28 DIAGNOSIS — R07.9 CHEST PAIN, UNSPECIFIED TYPE: Primary | ICD-10-CM

## 2023-12-28 LAB
ALBUMIN SERPL-MCNC: 3.9 G/DL (ref 3.5–5.2)
ALBUMIN/GLOB SERPL: 1.6 {RATIO} (ref 1–2.5)
ALP SERPL-CCNC: 84 U/L (ref 40–129)
ALT SERPL-CCNC: 23 U/L (ref 5–41)
ANION GAP SERPL CALCULATED.3IONS-SCNC: 11 MMOL/L (ref 9–17)
AST SERPL-CCNC: 34 U/L
BASOPHILS # BLD: 0.06 K/UL (ref 0–0.2)
BASOPHILS NFR BLD: 1 % (ref 0–2)
BILIRUB SERPL-MCNC: 1.4 MG/DL (ref 0.3–1.2)
BNP SERPL-MCNC: 1276 PG/ML
BUN SERPL-MCNC: 22 MG/DL (ref 6–20)
CALCIUM SERPL-MCNC: 9.3 MG/DL (ref 8.6–10.4)
CHLORIDE SERPL-SCNC: 101 MMOL/L (ref 98–107)
CO2 SERPL-SCNC: 24 MMOL/L (ref 20–31)
CREAT SERPL-MCNC: 1 MG/DL (ref 0.7–1.2)
D DIMER PPP FEU-MCNC: 0.29 UG/ML FEU (ref 0–0.57)
EOSINOPHIL # BLD: 0.11 K/UL (ref 0–0.44)
EOSINOPHILS RELATIVE PERCENT: 2 % (ref 1–4)
ERYTHROCYTE [DISTWIDTH] IN BLOOD BY AUTOMATED COUNT: 12.9 % (ref 11.8–14.4)
GFR SERPL CREATININE-BSD FRML MDRD: >60 ML/MIN/1.73M2
GLUCOSE SERPL-MCNC: 102 MG/DL (ref 70–99)
HCT VFR BLD AUTO: 45.8 % (ref 40.7–50.3)
HGB BLD-MCNC: 15 G/DL (ref 13–17)
IMM GRANULOCYTES # BLD AUTO: <0.03 K/UL (ref 0–0.3)
IMM GRANULOCYTES NFR BLD: 0 %
LYMPHOCYTES NFR BLD: 1.07 K/UL (ref 1.1–3.7)
LYMPHOCYTES RELATIVE PERCENT: 21 % (ref 24–43)
MAGNESIUM SERPL-MCNC: 1.9 MG/DL (ref 1.6–2.6)
MCH RBC QN AUTO: 31.1 PG (ref 25.2–33.5)
MCHC RBC AUTO-ENTMCNC: 32.8 G/DL (ref 28.4–34.8)
MCV RBC AUTO: 95 FL (ref 82.6–102.9)
MONOCYTES NFR BLD: 0.41 K/UL (ref 0.1–1.2)
MONOCYTES NFR BLD: 8 % (ref 3–12)
NEUTROPHILS NFR BLD: 68 % (ref 36–65)
NEUTS SEG NFR BLD: 3.38 K/UL (ref 1.5–8.1)
NRBC BLD-RTO: 0 PER 100 WBC
PLATELET # BLD AUTO: 112 K/UL (ref 138–453)
PMV BLD AUTO: 12.3 FL (ref 8.1–13.5)
POTASSIUM SERPL-SCNC: 4.6 MMOL/L (ref 3.7–5.3)
PROT SERPL-MCNC: 6.3 G/DL (ref 6.4–8.3)
RBC # BLD AUTO: 4.82 M/UL (ref 4.21–5.77)
SODIUM SERPL-SCNC: 136 MMOL/L (ref 135–144)
T4 FREE SERPL-MCNC: 0.9 NG/DL (ref 0.9–1.7)
TROPONIN I SERPL HS-MCNC: 49 NG/L (ref 0–22)
TROPONIN I SERPL HS-MCNC: 50 NG/L (ref 0–22)
TSH SERPL DL<=0.05 MIU/L-ACNC: 5.91 UIU/ML (ref 0.3–5)
WBC OTHER # BLD: 5 K/UL (ref 3.5–11.3)

## 2023-12-28 PROCEDURE — 85379 FIBRIN DEGRADATION QUANT: CPT

## 2023-12-28 PROCEDURE — 85025 COMPLETE CBC W/AUTO DIFF WBC: CPT

## 2023-12-28 PROCEDURE — 71046 X-RAY EXAM CHEST 2 VIEWS: CPT

## 2023-12-28 PROCEDURE — 80053 COMPREHEN METABOLIC PANEL: CPT

## 2023-12-28 PROCEDURE — 84439 ASSAY OF FREE THYROXINE: CPT

## 2023-12-28 PROCEDURE — 84484 ASSAY OF TROPONIN QUANT: CPT

## 2023-12-28 PROCEDURE — 99285 EMERGENCY DEPT VISIT HI MDM: CPT

## 2023-12-28 PROCEDURE — 83735 ASSAY OF MAGNESIUM: CPT

## 2023-12-28 PROCEDURE — 93005 ELECTROCARDIOGRAM TRACING: CPT | Performed by: STUDENT IN AN ORGANIZED HEALTH CARE EDUCATION/TRAINING PROGRAM

## 2023-12-28 PROCEDURE — 83880 ASSAY OF NATRIURETIC PEPTIDE: CPT

## 2023-12-28 PROCEDURE — 84443 ASSAY THYROID STIM HORMONE: CPT

## 2023-12-28 RX ORDER — 0.9 % SODIUM CHLORIDE 0.9 %
500 INTRAVENOUS SOLUTION INTRAVENOUS ONCE
Status: DISCONTINUED | OUTPATIENT
Start: 2023-12-28 | End: 2023-12-28 | Stop reason: HOSPADM

## 2023-12-28 RX ORDER — ASPIRIN 81 MG/1
324 TABLET, CHEWABLE ORAL ONCE
Status: DISCONTINUED | OUTPATIENT
Start: 2023-12-28 | End: 2023-12-28

## 2023-12-28 RX ORDER — 0.9 % SODIUM CHLORIDE 0.9 %
1000 INTRAVENOUS SOLUTION INTRAVENOUS ONCE
Status: DISCONTINUED | OUTPATIENT
Start: 2023-12-28 | End: 2023-12-28

## 2023-12-28 ASSESSMENT — HEART SCORE: ECG: 1

## 2023-12-28 ASSESSMENT — PAIN - FUNCTIONAL ASSESSMENT: PAIN_FUNCTIONAL_ASSESSMENT: NONE - DENIES PAIN

## 2023-12-28 NOTE — ED NOTES
Pt presented to ED via LS11 for the complaint of chest pain. Pt was given 324mg ASA and 1 sublingual nitro. Pt states hx of MI, a fib, and chf. Pt was given 400ml of IV fluids prior to arrival. Pt ambulated with steady gait. RR even and non labored.

## 2023-12-28 NOTE — DISCHARGE INSTRUCTIONS
You have been seen in the ER today for Chest pain. You have decided to be discharged [facility], you accepted the risks and verbalized understanding of potential complications. Please return to the emergency department should you change your mind  If you begin to experience any symptoms such as chest pain shortness of breath nausea vomiting dizziness drowsiness abdominal pain loss of consciousness or any other symptoms you find concerning please return to the ED for follow-up evaluation. If you have been given pain medication please take them only as prescribed. Do not take more medication than prescribed at any given time. Please follow-up with your primary care provider within 3-5 days for continued care, sooner if you have concerns.

## 2023-12-28 NOTE — ED NOTES
Pt. Brady Manley instructed pt to hold breath to vagal maneuver. HR resolved from 140 BPM to 40 bpm. EKG completed and HR settled at 70 BPM. Pt denies chest pain.

## 2023-12-30 LAB
EKG ATRIAL RATE: 65 BPM
EKG P AXIS: 70 DEGREES
EKG P-R INTERVAL: 172 MS
EKG Q-T INTERVAL: 414 MS
EKG QRS DURATION: 92 MS
EKG QTC CALCULATION (BAZETT): 430 MS
EKG R AXIS: 58 DEGREES
EKG T AXIS: 48 DEGREES
EKG VENTRICULAR RATE: 65 BPM

## 2024-08-26 ENCOUNTER — HOSPITAL ENCOUNTER (EMERGENCY)
Age: 59
Discharge: HOME | End: 2024-08-26
Attending: EMERGENCY MEDICINE
Payer: COMMERCIAL

## 2024-08-26 ENCOUNTER — APPOINTMENT (OUTPATIENT)
Dept: VASCULAR LAB | Age: 59
End: 2024-08-26
Payer: COMMERCIAL

## 2024-08-26 VITALS
SYSTOLIC BLOOD PRESSURE: 190 MMHG | DIASTOLIC BLOOD PRESSURE: 86 MMHG | OXYGEN SATURATION: 98 % | TEMPERATURE: 97 F | BODY MASS INDEX: 33.6 KG/M2 | RESPIRATION RATE: 19 BRPM | WEIGHT: 240 LBS | HEART RATE: 52 BPM | HEIGHT: 71 IN

## 2024-08-26 DIAGNOSIS — M79.604 RIGHT LEG PAIN: Primary | ICD-10-CM

## 2024-08-26 DIAGNOSIS — M54.41 ACUTE RIGHT-SIDED LOW BACK PAIN WITH RIGHT-SIDED SCIATICA: ICD-10-CM

## 2024-08-26 LAB — ECHO BSA: 2.34 M2

## 2024-08-26 PROCEDURE — 93971 EXTREMITY STUDY: CPT

## 2024-08-26 PROCEDURE — 99284 EMERGENCY DEPT VISIT MOD MDM: CPT

## 2024-08-26 PROCEDURE — 6370000000 HC RX 637 (ALT 250 FOR IP)

## 2024-08-26 PROCEDURE — 6360000002 HC RX W HCPCS

## 2024-08-26 PROCEDURE — 96372 THER/PROPH/DIAG INJ SC/IM: CPT

## 2024-08-26 PROCEDURE — 93971 EXTREMITY STUDY: CPT | Performed by: SURGERY

## 2024-08-26 RX ORDER — LIDOCAINE 4 G/G
1 PATCH TOPICAL ONCE
Status: DISCONTINUED | OUTPATIENT
Start: 2024-08-26 | End: 2024-08-26 | Stop reason: HOSPADM

## 2024-08-26 RX ORDER — KETOROLAC TROMETHAMINE 30 MG/ML
30 INJECTION, SOLUTION INTRAMUSCULAR; INTRAVENOUS ONCE
Status: COMPLETED | OUTPATIENT
Start: 2024-08-26 | End: 2024-08-26

## 2024-08-26 RX ORDER — METHOCARBAMOL 750 MG/1
750 TABLET, FILM COATED ORAL 4 TIMES DAILY
Qty: 40 TABLET | Refills: 0 | Status: SHIPPED | OUTPATIENT
Start: 2024-08-26 | End: 2024-09-05

## 2024-08-26 RX ORDER — METHOCARBAMOL 500 MG/1
750 TABLET, FILM COATED ORAL ONCE
Status: COMPLETED | OUTPATIENT
Start: 2024-08-26 | End: 2024-08-26

## 2024-08-26 RX ORDER — LIDOCAINE 4 G/G
1 PATCH TOPICAL DAILY
Qty: 30 PATCH | Refills: 0 | Status: SHIPPED | OUTPATIENT
Start: 2024-08-26 | End: 2024-09-25

## 2024-08-26 RX ADMIN — METHOCARBAMOL 750 MG: 500 TABLET ORAL at 10:46

## 2024-08-26 RX ADMIN — KETOROLAC TROMETHAMINE 30 MG: 30 INJECTION, SOLUTION INTRAMUSCULAR; INTRAVENOUS at 10:44

## 2024-08-26 ASSESSMENT — PAIN - FUNCTIONAL ASSESSMENT: PAIN_FUNCTIONAL_ASSESSMENT: 0-10

## 2024-08-26 ASSESSMENT — PAIN DESCRIPTION - ORIENTATION
ORIENTATION: RIGHT
ORIENTATION: RIGHT

## 2024-08-26 ASSESSMENT — PAIN DESCRIPTION - DESCRIPTORS: DESCRIPTORS: DISCOMFORT;ACHING

## 2024-08-26 ASSESSMENT — PAIN SCALES - GENERAL
PAINLEVEL_OUTOF10: 7
PAINLEVEL_OUTOF10: 5

## 2024-08-26 ASSESSMENT — ENCOUNTER SYMPTOMS
BACK PAIN: 1
SHORTNESS OF BREATH: 0

## 2024-08-26 ASSESSMENT — PAIN DESCRIPTION - LOCATION
LOCATION: LEG
LOCATION: LEG

## 2024-08-26 ASSESSMENT — LIFESTYLE VARIABLES
HOW MANY STANDARD DRINKS CONTAINING ALCOHOL DO YOU HAVE ON A TYPICAL DAY: PATIENT DOES NOT DRINK
HOW OFTEN DO YOU HAVE A DRINK CONTAINING ALCOHOL: NEVER

## 2024-08-26 NOTE — ED PROVIDER NOTES
[facility] ED     Emergency Department     Faculty Attestation        I performed a history and physical examination of the patient and discussed management with the resident. I reviewed the resident’s note and agree with the documented findings and plan of care. Any areas of disagreement are noted on the chart. I was personally present for the key portions of any procedures. I have documented in the chart those procedures where I was not present during the key portions. I have reviewed the emergency nurses triage note. I agree with the chief complaint, past medical history, past surgical history, allergies, medications, social and family history as documented unless otherwise noted below.    For mid-level providers such as nurse practitioners as well as physicians assistants:    I have personally seen and evaluated the patient.    I find the patient's history and physical exam are consistent with NP/PA documentation.  I agree with the care provided, treatment rendered, disposition, & follow-up plan.     Additional findings are as noted.    Vital Signs: BP (!) 190/86   Pulse 52   Temp 97 °F (36.1 °C)   Resp 19   Ht 1.803 m (5' 11\")   Wt 108.9 kg (240 lb)   SpO2 98%   BMI 33.47 kg/m²   PCP:  No primary care provider on file.    Pertinent Comments:     Patient with right calf pain.  He has had pain that starts in his right upper buttocks and radiates down his leg and has a burning sensation in the back of his leg.  He denies any low back pain.  There is no bowel or bladder incontinence there is no weakness to the lower extremity denies any falls or trauma.  On exam he has pink warm and well-perfused right lower extremity: With intact pulses.  There is right calf tenderness but his compartments are soft and compressible.  He is able to walk and ambulate with no assistance and he has 5 out of 5 strength in bilateral lower extremities.      Critical

## 2024-08-26 NOTE — ED NOTES
Pt is A+Ox4  Pt complains of right leg pain x 2 weeks  Pt denies injury or trauma tot he right leg  Pt states he woke up feeling this way  Pt denies numbness or tingling to the right leg  Pt denies any difficulties with bowel movements or urination  All questions answered and needs met at this time

## 2024-08-26 NOTE — ED PROVIDER NOTES
[facility] ED  Emergency Department Encounter  Emergency Medicine Resident     Pt Name:Michael Garcia  MRN: [id number]  Birthdate 1965  Date of evaluation: 8/26/24  PCP:  No primary care provider on file.  Note Started: 10:30 AM EDT      CHIEF COMPLAINT       Chief Complaint   Patient presents with    Leg Pain     right       HISTORY OF PRESENT ILLNESS  (Location/Symptom, Timing/Onset, Context/Setting, Quality, Duration, Modifying Factors, Severity.)      Michael Garcia is a 59 y.o. male presenting with swelling and pain in his right leg, which has been ongoing since Thursday or Friday. He reports that this is the first time he has experienced this specific issue. The current swelling is localized to the right leg, extending from the buttocks down to the toes, with the pain being more pronounced in the posterior aspect of the leg. The patient also has a wound on the leg, which he sustained at work, but he is unsure of the exact duration. He notes that the redness in the leg is not new and mentions having poor blood circulation. He has not been tested for diabetes but suspects he might have it. He denies any history of blood clots, recent long travel, smoking, or hormone use. The patient has not taken any home medications for pain. He does report some right lower back pain.  Denies any injury, trauma, inciting incident.  Patient unsure if he is on a blood thinner.    PAST MEDICAL / SURGICAL / SOCIAL / FAMILY HISTORY      has a past medical history of Anasarca, Elevated troponin, Hypothyroidism, New onset atrial fibrillation (HCC), and New onset of congestive heart failure (HCC).       has a past surgical history that includes Elbow surgery; transesophageal echocardiogram (12/28/2020); Cardioversion (12/28/2020); and Cardiac catheterization (12/28/2020).      Social History     Socioeconomic History    Marital status: Unknown     Spouse name: Not on file    Number of children: Not on file    Years of

## 2025-08-11 ENCOUNTER — APPOINTMENT (OUTPATIENT)
Dept: GENERAL RADIOLOGY | Age: 60
DRG: 280 | End: 2025-08-11

## 2025-08-11 ENCOUNTER — HOSPITAL ENCOUNTER (INPATIENT)
Age: 60
LOS: 2 days | Discharge: HOME | DRG: 280 | End: 2025-08-13
Attending: EMERGENCY MEDICINE | Admitting: HOSPITALIST

## 2025-08-11 ENCOUNTER — APPOINTMENT (OUTPATIENT)
Dept: NUCLEAR MEDICINE | Age: 60
DRG: 280 | End: 2025-08-11

## 2025-08-11 ENCOUNTER — HOSPITAL ENCOUNTER (INPATIENT)
Age: 60
Discharge: HOME | DRG: 280 | End: 2025-08-13
Attending: STUDENT IN AN ORGANIZED HEALTH CARE EDUCATION/TRAINING PROGRAM

## 2025-08-11 VITALS
SYSTOLIC BLOOD PRESSURE: 121 MMHG | RESPIRATION RATE: 15 BRPM | OXYGEN SATURATION: 96 % | HEART RATE: 104 BPM | DIASTOLIC BLOOD PRESSURE: 85 MMHG

## 2025-08-11 DIAGNOSIS — N17.9 AKI (ACUTE KIDNEY INJURY): ICD-10-CM

## 2025-08-11 DIAGNOSIS — I20.9 ANGINA PECTORIS: ICD-10-CM

## 2025-08-11 DIAGNOSIS — I24.9 ACUTE CORONARY SYNDROME (HCC): ICD-10-CM

## 2025-08-11 DIAGNOSIS — I48.91 ATRIAL FIBRILLATION WITH RAPID VENTRICULAR RESPONSE (HCC): ICD-10-CM

## 2025-08-11 DIAGNOSIS — I48.91 ATRIAL FIBRILLATION WITH RVR (HCC): Primary | ICD-10-CM

## 2025-08-11 PROBLEM — E78.5 HYPERLIPIDEMIA: Status: ACTIVE | Noted: 2025-08-11

## 2025-08-11 PROBLEM — I21.4 NSTEMI (NON-ST ELEVATED MYOCARDIAL INFARCTION) (HCC): Status: RESOLVED | Noted: 2025-08-11 | Resolved: 2025-08-11

## 2025-08-11 PROBLEM — I21.4 NSTEMI (NON-ST ELEVATED MYOCARDIAL INFARCTION) (HCC): Status: ACTIVE | Noted: 2025-08-11

## 2025-08-11 PROBLEM — I20.0 UNSTABLE ANGINA (HCC): Status: ACTIVE | Noted: 2025-08-11

## 2025-08-11 PROBLEM — I25.10 CORONARY ARTERY DISEASE: Status: ACTIVE | Noted: 2025-08-11

## 2025-08-11 LAB
ALBUMIN SERPL-MCNC: 3.7 G/DL (ref 3.5–5.2)
ALBUMIN/GLOB SERPL: 1.2 {RATIO} (ref 1–2.5)
ALP SERPL-CCNC: 99 U/L (ref 40–129)
ALT SERPL-CCNC: 23 U/L (ref 10–50)
ANION GAP SERPL CALCULATED.3IONS-SCNC: 10 MMOL/L (ref 9–16)
ANION GAP SERPL CALCULATED.3IONS-SCNC: 12 MMOL/L (ref 9–16)
ANTI-XA UNFRAC HEPARIN: 0.13 IU/L
ANTI-XA UNFRAC HEPARIN: <0.1 IU/L
AST SERPL-CCNC: 35 U/L (ref 10–50)
BASOPHILS # BLD: 0.08 K/UL (ref 0–0.2)
BASOPHILS NFR BLD: 1 % (ref 0–2)
BILIRUB SERPL-MCNC: 1.6 MG/DL (ref 0–1.2)
BNP SERPL-MCNC: 6175 PG/ML (ref 0–125)
BUN SERPL-MCNC: 27 MG/DL (ref 8–23)
BUN SERPL-MCNC: 34 MG/DL (ref 8–23)
CALCIUM SERPL-MCNC: 8.6 MG/DL (ref 8.6–10.4)
CALCIUM SERPL-MCNC: 9.3 MG/DL (ref 8.6–10.4)
CHLORIDE SERPL-SCNC: 103 MMOL/L (ref 98–107)
CHLORIDE SERPL-SCNC: 104 MMOL/L (ref 98–107)
CO2 SERPL-SCNC: 20 MMOL/L (ref 20–31)
CO2 SERPL-SCNC: 21 MMOL/L (ref 20–31)
CREAT SERPL-MCNC: 1.7 MG/DL (ref 0.7–1.2)
CREAT SERPL-MCNC: 1.9 MG/DL (ref 0.7–1.2)
DIGOXIN SERPL-MCNC: <0.3 NG/ML (ref 0.8–2)
ECHO BSA: 2.48 M2
EOSINOPHIL # BLD: 0.17 K/UL (ref 0–0.44)
EOSINOPHILS RELATIVE PERCENT: 2 % (ref 1–4)
ERYTHROCYTE [DISTWIDTH] IN BLOOD BY AUTOMATED COUNT: 12.9 % (ref 11.8–14.4)
ERYTHROCYTE [DISTWIDTH] IN BLOOD BY AUTOMATED COUNT: 13 % (ref 11.8–14.4)
EST. AVERAGE GLUCOSE BLD GHB EST-MCNC: 120 MG/DL
ETHANOL PERCENT: <0.01 %
ETHANOLAMINE SERPL-MCNC: <10 MG/DL (ref 0–0.08)
GFR, ESTIMATED: 40 ML/MIN/1.73M2
GFR, ESTIMATED: 46 ML/MIN/1.73M2
GLUCOSE SERPL-MCNC: 164 MG/DL (ref 74–99)
GLUCOSE SERPL-MCNC: 201 MG/DL (ref 74–99)
HBA1C MFR BLD: 5.8 % (ref 4–6)
HCT VFR BLD AUTO: 52.2 % (ref 40.7–50.3)
HCT VFR BLD AUTO: 53.3 % (ref 40.7–50.3)
HGB BLD-MCNC: 17.2 G/DL (ref 13–17)
HGB BLD-MCNC: 17.5 G/DL (ref 13–17)
IMM GRANULOCYTES # BLD AUTO: 0.03 K/UL (ref 0–0.3)
IMM GRANULOCYTES NFR BLD: 0 %
INR PPP: 1
LYMPHOCYTES NFR BLD: 1.24 K/UL (ref 1.1–3.7)
LYMPHOCYTES RELATIVE PERCENT: 13 % (ref 24–43)
MCH RBC QN AUTO: 31.7 PG (ref 25.2–33.5)
MCH RBC QN AUTO: 32.1 PG (ref 25.2–33.5)
MCHC RBC AUTO-ENTMCNC: 32.8 G/DL (ref 28.4–34.8)
MCHC RBC AUTO-ENTMCNC: 33 G/DL (ref 28.4–34.8)
MCV RBC AUTO: 96.1 FL (ref 82.6–102.9)
MCV RBC AUTO: 97.6 FL (ref 82.6–102.9)
MONOCYTES NFR BLD: 0.69 K/UL (ref 0.1–1.2)
MONOCYTES NFR BLD: 7 % (ref 3–12)
NEUTROPHILS NFR BLD: 77 % (ref 36–65)
NEUTS SEG NFR BLD: 7.26 K/UL (ref 1.5–8.1)
NRBC BLD-RTO: 0 PER 100 WBC
NRBC BLD-RTO: 0 PER 100 WBC
PARTIAL THROMBOPLASTIN TIME: 27.3 SEC (ref 23–36.5)
PLATELET # BLD AUTO: 137 K/UL (ref 138–453)
PLATELET # BLD AUTO: 157 K/UL (ref 138–453)
PMV BLD AUTO: 12 FL (ref 8.1–13.5)
PMV BLD AUTO: 12.4 FL (ref 8.1–13.5)
POTASSIUM SERPL-SCNC: 5 MMOL/L (ref 3.7–5.3)
POTASSIUM SERPL-SCNC: 5.2 MMOL/L (ref 3.7–5.3)
PROT SERPL-MCNC: 6.8 G/DL (ref 6.6–8.7)
PROTHROMBIN TIME: 13.6 SEC (ref 11.7–14.9)
RBC # BLD AUTO: 5.43 M/UL (ref 4.21–5.77)
RBC # BLD AUTO: 5.46 M/UL (ref 4.21–5.77)
SODIUM SERPL-SCNC: 135 MMOL/L (ref 136–145)
SODIUM SERPL-SCNC: 135 MMOL/L (ref 136–145)
T4 FREE SERPL-MCNC: 0.7 NG/DL (ref 0.92–1.68)
TROPONIN I SERPL HS-MCNC: 57 NG/L (ref 0–22)
TROPONIN I SERPL HS-MCNC: 67 NG/L (ref 0–22)
TSH SERPL DL<=0.05 MIU/L-ACNC: 9.15 UIU/ML (ref 0.27–4.2)
WBC OTHER # BLD: 8.6 K/UL (ref 3.5–11.3)
WBC OTHER # BLD: 9.5 K/UL (ref 3.5–11.3)

## 2025-08-11 PROCEDURE — 80048 BASIC METABOLIC PNL TOTAL CA: CPT

## 2025-08-11 PROCEDURE — 6360000002 HC RX W HCPCS

## 2025-08-11 PROCEDURE — 93005 ELECTROCARDIOGRAM TRACING: CPT

## 2025-08-11 PROCEDURE — 36415 COLL VENOUS BLD VENIPUNCTURE: CPT

## 2025-08-11 PROCEDURE — 93312 ECHO TRANSESOPHAGEAL: CPT

## 2025-08-11 PROCEDURE — 84484 ASSAY OF TROPONIN QUANT: CPT

## 2025-08-11 PROCEDURE — 96374 THER/PROPH/DIAG INJ IV PUSH: CPT

## 2025-08-11 PROCEDURE — 83880 ASSAY OF NATRIURETIC PEPTIDE: CPT

## 2025-08-11 PROCEDURE — 6370000000 HC RX 637 (ALT 250 FOR IP)

## 2025-08-11 PROCEDURE — 2060000000 HC ICU INTERMEDIATE R&B

## 2025-08-11 PROCEDURE — 2500000003 HC RX 250 WO HCPCS

## 2025-08-11 PROCEDURE — 84443 ASSAY THYROID STIM HORMONE: CPT

## 2025-08-11 PROCEDURE — B24BZZ4 ULTRASONOGRAPHY OF HEART WITH AORTA, TRANSESOPHAGEAL: ICD-10-PCS | Performed by: INTERNAL MEDICINE

## 2025-08-11 PROCEDURE — 80053 COMPREHEN METABOLIC PANEL: CPT

## 2025-08-11 PROCEDURE — 83036 HEMOGLOBIN GLYCOSYLATED A1C: CPT

## 2025-08-11 PROCEDURE — 5A2204Z RESTORATION OF CARDIAC RHYTHM, SINGLE: ICD-10-PCS | Performed by: INTERNAL MEDICINE

## 2025-08-11 PROCEDURE — 93017 CV STRESS TEST TRACING ONLY: CPT

## 2025-08-11 PROCEDURE — 2580000003 HC RX 258

## 2025-08-11 PROCEDURE — 78452 HT MUSCLE IMAGE SPECT MULT: CPT

## 2025-08-11 PROCEDURE — 3430000000 HC RX DIAGNOSTIC RADIOPHARMACEUTICAL: Performed by: STUDENT IN AN ORGANIZED HEALTH CARE EDUCATION/TRAINING PROGRAM

## 2025-08-11 PROCEDURE — 85610 PROTHROMBIN TIME: CPT

## 2025-08-11 PROCEDURE — 71045 X-RAY EXAM CHEST 1 VIEW: CPT

## 2025-08-11 PROCEDURE — A9500 TC99M SESTAMIBI: HCPCS | Performed by: STUDENT IN AN ORGANIZED HEALTH CARE EDUCATION/TRAINING PROGRAM

## 2025-08-11 PROCEDURE — 85025 COMPLETE CBC W/AUTO DIFF WBC: CPT

## 2025-08-11 PROCEDURE — 99223 1ST HOSP IP/OBS HIGH 75: CPT | Performed by: HOSPITALIST

## 2025-08-11 PROCEDURE — G0480 DRUG TEST DEF 1-7 CLASSES: HCPCS

## 2025-08-11 PROCEDURE — 80162 ASSAY OF DIGOXIN TOTAL: CPT

## 2025-08-11 PROCEDURE — 99285 EMERGENCY DEPT VISIT HI MDM: CPT

## 2025-08-11 PROCEDURE — 85520 HEPARIN ASSAY: CPT

## 2025-08-11 PROCEDURE — 85027 COMPLETE CBC AUTOMATED: CPT

## 2025-08-11 PROCEDURE — 84439 ASSAY OF FREE THYROXINE: CPT

## 2025-08-11 PROCEDURE — 85730 THROMBOPLASTIN TIME PARTIAL: CPT

## 2025-08-11 PROCEDURE — 99152 MOD SED SAME PHYS/QHP 5/>YRS: CPT

## 2025-08-11 RX ORDER — MIDAZOLAM HYDROCHLORIDE 1 MG/ML
INJECTION, SOLUTION INTRAMUSCULAR; INTRAVENOUS PRN
Status: COMPLETED | OUTPATIENT
Start: 2025-08-11 | End: 2025-08-11

## 2025-08-11 RX ORDER — METOPROLOL TARTRATE 1 MG/ML
5 INJECTION, SOLUTION INTRAVENOUS ONCE
Status: DISCONTINUED | OUTPATIENT
Start: 2025-08-11 | End: 2025-08-11

## 2025-08-11 RX ORDER — AMINOPHYLLINE 25 MG/ML
50 INJECTION, SOLUTION INTRAVENOUS PRN
Status: CANCELLED | OUTPATIENT
Start: 2025-08-11 | End: 2025-08-11

## 2025-08-11 RX ORDER — FENTANYL CITRATE 50 UG/ML
INJECTION, SOLUTION INTRAMUSCULAR; INTRAVENOUS PRN
Status: COMPLETED | OUTPATIENT
Start: 2025-08-11 | End: 2025-08-11

## 2025-08-11 RX ORDER — ONDANSETRON 4 MG/1
4 TABLET, ORALLY DISINTEGRATING ORAL EVERY 8 HOURS PRN
Status: DISCONTINUED | OUTPATIENT
Start: 2025-08-11 | End: 2025-08-13 | Stop reason: HOSPADM

## 2025-08-11 RX ORDER — PANTOPRAZOLE SODIUM 40 MG/1
40 TABLET, DELAYED RELEASE ORAL
Status: DISCONTINUED | OUTPATIENT
Start: 2025-08-11 | End: 2025-08-13 | Stop reason: HOSPADM

## 2025-08-11 RX ORDER — HEPARIN SODIUM 10000 [USP'U]/100ML
5-30 INJECTION, SOLUTION INTRAVENOUS CONTINUOUS
Status: DISCONTINUED | OUTPATIENT
Start: 2025-08-11 | End: 2025-08-11

## 2025-08-11 RX ORDER — AMIODARONE HYDROCHLORIDE 200 MG/1
200 TABLET ORAL DAILY
Status: DISCONTINUED | OUTPATIENT
Start: 2025-08-11 | End: 2025-08-13 | Stop reason: HOSPADM

## 2025-08-11 RX ORDER — ALBUTEROL SULFATE 90 UG/1
2 INHALANT RESPIRATORY (INHALATION) PRN
Status: CANCELLED | OUTPATIENT
Start: 2025-08-11 | End: 2025-08-11

## 2025-08-11 RX ORDER — METOPROLOL TARTRATE 1 MG/ML
5 INJECTION, SOLUTION INTRAVENOUS ONCE
Status: COMPLETED | OUTPATIENT
Start: 2025-08-11 | End: 2025-08-11

## 2025-08-11 RX ORDER — LEVOTHYROXINE SODIUM 50 UG/1
50 TABLET ORAL DAILY
Status: DISCONTINUED | OUTPATIENT
Start: 2025-08-11 | End: 2025-08-11

## 2025-08-11 RX ORDER — METOPROLOL TARTRATE 1 MG/ML
5 INJECTION, SOLUTION INTRAVENOUS EVERY 5 MIN PRN
Status: CANCELLED | OUTPATIENT
Start: 2025-08-11 | End: 2025-08-11

## 2025-08-11 RX ORDER — SODIUM CHLORIDE, SODIUM LACTATE, POTASSIUM CHLORIDE, CALCIUM CHLORIDE 600; 310; 30; 20 MG/100ML; MG/100ML; MG/100ML; MG/100ML
INJECTION, SOLUTION INTRAVENOUS CONTINUOUS
Status: DISCONTINUED | OUTPATIENT
Start: 2025-08-11 | End: 2025-08-11

## 2025-08-11 RX ORDER — NITROGLYCERIN 0.4 MG/1
0.4 TABLET SUBLINGUAL EVERY 5 MIN PRN
Status: CANCELLED | OUTPATIENT
Start: 2025-08-11 | End: 2025-08-11

## 2025-08-11 RX ORDER — METOPROLOL TARTRATE 50 MG
50 TABLET ORAL 2 TIMES DAILY
Status: DISCONTINUED | OUTPATIENT
Start: 2025-08-11 | End: 2025-08-13 | Stop reason: HOSPADM

## 2025-08-11 RX ORDER — SODIUM CHLORIDE 9 MG/ML
INJECTION, SOLUTION INTRAVENOUS PRN
Status: DISCONTINUED | OUTPATIENT
Start: 2025-08-11 | End: 2025-08-13 | Stop reason: HOSPADM

## 2025-08-11 RX ORDER — ACETAMINOPHEN 650 MG/1
650 SUPPOSITORY RECTAL EVERY 6 HOURS PRN
Status: DISCONTINUED | OUTPATIENT
Start: 2025-08-11 | End: 2025-08-13 | Stop reason: HOSPADM

## 2025-08-11 RX ORDER — LEVOTHYROXINE SODIUM 50 UG/1
50 TABLET ORAL DAILY
Status: DISCONTINUED | OUTPATIENT
Start: 2025-08-12 | End: 2025-08-13 | Stop reason: HOSPADM

## 2025-08-11 RX ORDER — ACETAMINOPHEN 325 MG/1
650 TABLET ORAL EVERY 6 HOURS PRN
Status: DISCONTINUED | OUTPATIENT
Start: 2025-08-11 | End: 2025-08-13 | Stop reason: HOSPADM

## 2025-08-11 RX ORDER — POLYETHYLENE GLYCOL 3350 17 G/17G
17 POWDER, FOR SOLUTION ORAL DAILY PRN
Status: DISCONTINUED | OUTPATIENT
Start: 2025-08-11 | End: 2025-08-13 | Stop reason: HOSPADM

## 2025-08-11 RX ORDER — HEPARIN SODIUM 10000 [USP'U]/100ML
5-30 INJECTION, SOLUTION INTRAVENOUS CONTINUOUS
Status: DISCONTINUED | OUTPATIENT
Start: 2025-08-11 | End: 2025-08-12

## 2025-08-11 RX ORDER — MAGNESIUM SULFATE IN WATER 40 MG/ML
2000 INJECTION, SOLUTION INTRAVENOUS PRN
Status: DISCONTINUED | OUTPATIENT
Start: 2025-08-11 | End: 2025-08-13 | Stop reason: HOSPADM

## 2025-08-11 RX ORDER — HEPARIN SODIUM 1000 [USP'U]/ML
4000 INJECTION, SOLUTION INTRAVENOUS; SUBCUTANEOUS PRN
Status: DISCONTINUED | OUTPATIENT
Start: 2025-08-11 | End: 2025-08-12

## 2025-08-11 RX ORDER — LIDOCAINE HYDROCHLORIDE 20 MG/ML
SOLUTION OROPHARYNGEAL PRN
Status: COMPLETED | OUTPATIENT
Start: 2025-08-11 | End: 2025-08-11

## 2025-08-11 RX ORDER — ATROPINE SULFATE 0.1 MG/ML
0.5 INJECTION INTRAVENOUS EVERY 5 MIN PRN
Status: CANCELLED | OUTPATIENT
Start: 2025-08-11 | End: 2025-08-11

## 2025-08-11 RX ORDER — POTASSIUM CHLORIDE 1500 MG/1
40 TABLET, EXTENDED RELEASE ORAL PRN
Status: DISCONTINUED | OUTPATIENT
Start: 2025-08-11 | End: 2025-08-13 | Stop reason: HOSPADM

## 2025-08-11 RX ORDER — SODIUM CHLORIDE 9 MG/ML
500 INJECTION, SOLUTION INTRAVENOUS CONTINUOUS PRN
Status: CANCELLED | OUTPATIENT
Start: 2025-08-11 | End: 2025-08-11

## 2025-08-11 RX ORDER — HEPARIN SODIUM 1000 [USP'U]/ML
4000 INJECTION, SOLUTION INTRAVENOUS; SUBCUTANEOUS ONCE
Status: COMPLETED | OUTPATIENT
Start: 2025-08-11 | End: 2025-08-11

## 2025-08-11 RX ORDER — REGADENOSON 0.08 MG/ML
0.4 INJECTION, SOLUTION INTRAVENOUS
Status: CANCELLED | OUTPATIENT
Start: 2025-08-11

## 2025-08-11 RX ORDER — HEPARIN SODIUM 1000 [USP'U]/ML
2000 INJECTION, SOLUTION INTRAVENOUS; SUBCUTANEOUS PRN
Status: DISCONTINUED | OUTPATIENT
Start: 2025-08-11 | End: 2025-08-11

## 2025-08-11 RX ORDER — SENNOSIDES 8.6 MG
325 CAPSULE ORAL ONCE
Status: DISCONTINUED | OUTPATIENT
Start: 2025-08-11 | End: 2025-08-13 | Stop reason: HOSPADM

## 2025-08-11 RX ORDER — SODIUM CHLORIDE 0.9 % (FLUSH) 0.9 %
5-40 SYRINGE (ML) INJECTION PRN
Status: DISCONTINUED | OUTPATIENT
Start: 2025-08-11 | End: 2025-08-13 | Stop reason: HOSPADM

## 2025-08-11 RX ORDER — ONDANSETRON 2 MG/ML
4 INJECTION INTRAMUSCULAR; INTRAVENOUS EVERY 6 HOURS PRN
Status: DISCONTINUED | OUTPATIENT
Start: 2025-08-11 | End: 2025-08-13 | Stop reason: HOSPADM

## 2025-08-11 RX ORDER — 0.9 % SODIUM CHLORIDE 0.9 %
1000 INTRAVENOUS SOLUTION INTRAVENOUS ONCE
Status: COMPLETED | OUTPATIENT
Start: 2025-08-11 | End: 2025-08-11

## 2025-08-11 RX ORDER — ATORVASTATIN CALCIUM 80 MG/1
80 TABLET, FILM COATED ORAL NIGHTLY
Status: DISCONTINUED | OUTPATIENT
Start: 2025-08-11 | End: 2025-08-13 | Stop reason: HOSPADM

## 2025-08-11 RX ORDER — ATORVASTATIN CALCIUM 80 MG/1
40 TABLET, FILM COATED ORAL NIGHTLY
Status: DISCONTINUED | OUTPATIENT
Start: 2025-08-11 | End: 2025-08-11

## 2025-08-11 RX ORDER — HEPARIN SODIUM 1000 [USP'U]/ML
2000 INJECTION, SOLUTION INTRAVENOUS; SUBCUTANEOUS PRN
Status: DISCONTINUED | OUTPATIENT
Start: 2025-08-11 | End: 2025-08-12

## 2025-08-11 RX ORDER — SODIUM CHLORIDE 0.9 % (FLUSH) 0.9 %
5-40 SYRINGE (ML) INJECTION EVERY 12 HOURS SCHEDULED
Status: DISCONTINUED | OUTPATIENT
Start: 2025-08-11 | End: 2025-08-13 | Stop reason: HOSPADM

## 2025-08-11 RX ORDER — POTASSIUM CHLORIDE 7.45 MG/ML
10 INJECTION INTRAVENOUS PRN
Status: DISCONTINUED | OUTPATIENT
Start: 2025-08-11 | End: 2025-08-13 | Stop reason: HOSPADM

## 2025-08-11 RX ORDER — SODIUM CHLORIDE 0.9 % (FLUSH) 0.9 %
5-40 SYRINGE (ML) INJECTION PRN
Status: CANCELLED | OUTPATIENT
Start: 2025-08-11 | End: 2025-08-11

## 2025-08-11 RX ORDER — HEPARIN SODIUM 1000 [USP'U]/ML
4000 INJECTION, SOLUTION INTRAVENOUS; SUBCUTANEOUS PRN
Status: DISCONTINUED | OUTPATIENT
Start: 2025-08-11 | End: 2025-08-11

## 2025-08-11 RX ORDER — HEPARIN SODIUM 1000 [USP'U]/ML
4000 INJECTION, SOLUTION INTRAVENOUS; SUBCUTANEOUS ONCE
Status: DISCONTINUED | OUTPATIENT
Start: 2025-08-11 | End: 2025-08-11

## 2025-08-11 RX ADMIN — PANTOPRAZOLE SODIUM 40 MG: 40 TABLET, DELAYED RELEASE ORAL at 11:46

## 2025-08-11 RX ADMIN — AMIODARONE HYDROCHLORIDE 200 MG: 200 TABLET ORAL at 11:59

## 2025-08-11 RX ADMIN — HEPARIN SODIUM 4000 UNITS: 1000 INJECTION, SOLUTION INTRAVENOUS; SUBCUTANEOUS at 11:28

## 2025-08-11 RX ADMIN — HEPARIN SODIUM AND DEXTROSE 8 UNITS/KG/HR: 10000; 5 INJECTION INTRAVENOUS at 11:30

## 2025-08-11 RX ADMIN — DILTIAZEM HYDROCHLORIDE 5 MG/HR: 5 INJECTION, SOLUTION INTRAVENOUS at 12:40

## 2025-08-11 RX ADMIN — SODIUM CHLORIDE 1000 ML: 0.9 INJECTION, SOLUTION INTRAVENOUS at 07:42

## 2025-08-11 RX ADMIN — SODIUM CHLORIDE, PRESERVATIVE FREE 10 ML: 5 INJECTION INTRAVENOUS at 21:13

## 2025-08-11 RX ADMIN — TETRAKIS(2-METHOXYISOBUTYLISOCYANIDE)COPPER(I) TETRAFLUOROBORATE 39 MILLICURIE: 1 INJECTION, POWDER, LYOPHILIZED, FOR SOLUTION INTRAVENOUS at 12:55

## 2025-08-11 RX ADMIN — HEPARIN SODIUM 2000 UNITS: 1000 INJECTION INTRAVENOUS; SUBCUTANEOUS at 19:20

## 2025-08-11 RX ADMIN — MIDAZOLAM 1 MG: 1 INJECTION INTRAMUSCULAR; INTRAVENOUS at 14:37

## 2025-08-11 RX ADMIN — MIDAZOLAM 1 MG: 1 INJECTION INTRAMUSCULAR; INTRAVENOUS at 14:29

## 2025-08-11 RX ADMIN — METOPROLOL TARTRATE 50 MG: 50 TABLET, FILM COATED ORAL at 11:45

## 2025-08-11 RX ADMIN — LIDOCAINE HYDROCHLORIDE 20 ML: 20 SOLUTION ORAL; TOPICAL at 14:26

## 2025-08-11 RX ADMIN — METOPROLOL TARTRATE 50 MG: 50 TABLET, FILM COATED ORAL at 21:12

## 2025-08-11 RX ADMIN — FENTANYL CITRATE 25 MCG: 50 INJECTION, SOLUTION INTRAMUSCULAR; INTRAVENOUS at 14:26

## 2025-08-11 RX ADMIN — ATORVASTATIN CALCIUM 80 MG: 80 TABLET, FILM COATED ORAL at 21:13

## 2025-08-11 RX ADMIN — MIDAZOLAM 1 MG: 1 INJECTION INTRAMUSCULAR; INTRAVENOUS at 14:26

## 2025-08-11 RX ADMIN — METOPROLOL TARTRATE 5 MG: 1 INJECTION, SOLUTION INTRAVENOUS at 07:41

## 2025-08-11 ASSESSMENT — LIFESTYLE VARIABLES
HOW MANY STANDARD DRINKS CONTAINING ALCOHOL DO YOU HAVE ON A TYPICAL DAY: 7 TO 9
HOW MANY STANDARD DRINKS CONTAINING ALCOHOL DO YOU HAVE ON A TYPICAL DAY: PATIENT DOES NOT DRINK
HOW OFTEN DO YOU HAVE A DRINK CONTAINING ALCOHOL: NEVER
HOW OFTEN DO YOU HAVE A DRINK CONTAINING ALCOHOL: 2-3 TIMES A WEEK

## 2025-08-11 ASSESSMENT — ENCOUNTER SYMPTOMS
ABDOMINAL DISTENTION: 0
NAUSEA: 0
DIARRHEA: 0
GASTROINTESTINAL NEGATIVE: 1
VOMITING: 0
CONSTIPATION: 0
ALLERGIC/IMMUNOLOGIC NEGATIVE: 1
APNEA: 0
ABDOMINAL PAIN: 0
RESPIRATORY NEGATIVE: 1
EYES NEGATIVE: 1

## 2025-08-11 ASSESSMENT — PAIN SCALES - GENERAL
PAINLEVEL_OUTOF10: 0
PAINLEVEL_OUTOF10: 0

## 2025-08-12 ENCOUNTER — APPOINTMENT (OUTPATIENT)
Dept: NUCLEAR MEDICINE | Age: 60
DRG: 280 | End: 2025-08-12

## 2025-08-12 ENCOUNTER — APPOINTMENT (OUTPATIENT)
Dept: ULTRASOUND IMAGING | Age: 60
DRG: 280 | End: 2025-08-12

## 2025-08-12 ENCOUNTER — HOSPITAL ENCOUNTER (INPATIENT)
Age: 60
Discharge: HOME | DRG: 280 | End: 2025-08-14

## 2025-08-12 VITALS — DIASTOLIC BLOOD PRESSURE: 85 MMHG | HEART RATE: 60 BPM | SYSTOLIC BLOOD PRESSURE: 153 MMHG | RESPIRATION RATE: 20 BRPM

## 2025-08-12 LAB
ALBUMIN SERPL-MCNC: 3.2 G/DL (ref 3.5–5.2)
ALBUMIN/GLOB SERPL: 1.3 {RATIO} (ref 1–2.5)
ALP SERPL-CCNC: 71 U/L (ref 40–129)
ALT SERPL-CCNC: 17 U/L (ref 10–50)
ANION GAP SERPL CALCULATED.3IONS-SCNC: 9 MMOL/L (ref 9–16)
ANTI-XA UNFRAC HEPARIN: 0.18 IU/L
ANTI-XA UNFRAC HEPARIN: 0.4 IU/L
ANTI-XA UNFRAC HEPARIN: 0.46 IU/L
AST SERPL-CCNC: 24 U/L (ref 10–50)
BASOPHILS # BLD: 0.04 K/UL (ref 0–0.2)
BASOPHILS NFR BLD: 1 % (ref 0–2)
BILIRUB DIRECT SERPL-MCNC: 0.6 MG/DL (ref 0–0.2)
BILIRUB INDIRECT SERPL-MCNC: 0.8 MG/DL (ref 0–1)
BILIRUB SERPL-MCNC: 1.4 MG/DL (ref 0–1.2)
BUN SERPL-MCNC: 37 MG/DL (ref 8–23)
CALCIUM SERPL-MCNC: 8.7 MG/DL (ref 8.6–10.4)
CHLORIDE SERPL-SCNC: 103 MMOL/L (ref 98–107)
CHOLEST SERPL-MCNC: 174 MG/DL (ref 0–199)
CHOLESTEROL/HDL RATIO: 3
CO2 SERPL-SCNC: 21 MMOL/L (ref 20–31)
CREAT SERPL-MCNC: 1.9 MG/DL (ref 0.7–1.2)
ECHO BSA: 2.48 M2
EKG ATRIAL RATE: 127 BPM
EKG ATRIAL RATE: 170 BPM
EKG Q-T INTERVAL: 302 MS
EKG Q-T INTERVAL: 326 MS
EKG QRS DURATION: 90 MS
EKG QRS DURATION: 92 MS
EKG QTC CALCULATION (BAZETT): 432 MS
EKG QTC CALCULATION (BAZETT): 462 MS
EKG R AXIS: -14 DEGREES
EKG R AXIS: -4 DEGREES
EKG T AXIS: -40 DEGREES
EKG T AXIS: 6 DEGREES
EKG VENTRICULAR RATE: 121 BPM
EKG VENTRICULAR RATE: 123 BPM
EOSINOPHIL # BLD: 0.14 K/UL (ref 0–0.44)
EOSINOPHILS RELATIVE PERCENT: 2 % (ref 1–4)
ERYTHROCYTE [DISTWIDTH] IN BLOOD BY AUTOMATED COUNT: 13.2 % (ref 11.8–14.4)
GFR, ESTIMATED: 40 ML/MIN/1.73M2
GLOBULIN SER CALC-MCNC: 2.5 G/DL
GLUCOSE SERPL-MCNC: 118 MG/DL (ref 74–99)
HCT VFR BLD AUTO: 45.7 % (ref 40.7–50.3)
HDLC SERPL-MCNC: 58 MG/DL
HGB BLD-MCNC: 14.8 G/DL (ref 13–17)
IMM GRANULOCYTES # BLD AUTO: <0.03 K/UL (ref 0–0.3)
IMM GRANULOCYTES NFR BLD: 0 %
LDLC SERPL CALC-MCNC: 96 MG/DL (ref 0–100)
LYMPHOCYTES NFR BLD: 1.06 K/UL (ref 1.1–3.7)
LYMPHOCYTES RELATIVE PERCENT: 17 % (ref 24–43)
MCH RBC QN AUTO: 31.8 PG (ref 25.2–33.5)
MCHC RBC AUTO-ENTMCNC: 32.4 G/DL (ref 28.4–34.8)
MCV RBC AUTO: 98.1 FL (ref 82.6–102.9)
MONOCYTES NFR BLD: 0.53 K/UL (ref 0.1–1.2)
MONOCYTES NFR BLD: 9 % (ref 3–12)
NEUTROPHILS NFR BLD: 71 % (ref 36–65)
NEUTS SEG NFR BLD: 4.46 K/UL (ref 1.5–8.1)
NRBC BLD-RTO: 0 PER 100 WBC
NUC STRESS EJECTION FRACTION: 29 %
NUC STRESS LV EDV: 120 ML (ref 67–155)
PLATELET # BLD AUTO: 102 K/UL (ref 138–453)
PMV BLD AUTO: 12.1 FL (ref 8.1–13.5)
POTASSIUM SERPL-SCNC: 5.3 MMOL/L (ref 3.7–5.3)
POTASSIUM SERPL-SCNC: 5.6 MMOL/L (ref 3.7–5.3)
PROT SERPL-MCNC: 5.7 G/DL (ref 6.6–8.7)
RBC # BLD AUTO: 4.66 M/UL (ref 4.21–5.77)
SODIUM SERPL-SCNC: 133 MMOL/L (ref 136–145)
STRESS BASELINE DIAS BP: 95 MMHG
STRESS BASELINE HR: 59 BPM
STRESS BASELINE SYS BP: 154 MMHG
STRESS ESTIMATED WORKLOAD: 1 METS
STRESS PEAK DIAS BP: 78 MMHG
STRESS PEAK SYS BP: 155 MMHG
STRESS PERCENT HR ACHIEVED: 51 %
STRESS POST PEAK HR: 82 BPM
STRESS RATE PRESSURE PRODUCT: NORMAL BPM*MMHG
STRESS ST DEPRESSION: 0 MM
STRESS TARGET HR: 160 BPM
TID: 1.19
TRIGL SERPL-MCNC: 101 MG/DL
VLDLC SERPL CALC-MCNC: 20 MG/DL (ref 1–30)
WBC OTHER # BLD: 6.3 K/UL (ref 3.5–11.3)

## 2025-08-12 PROCEDURE — 6370000000 HC RX 637 (ALT 250 FOR IP): Performed by: HOSPITALIST

## 2025-08-12 PROCEDURE — 2060000000 HC ICU INTERMEDIATE R&B

## 2025-08-12 PROCEDURE — 2500000003 HC RX 250 WO HCPCS: Performed by: STUDENT IN AN ORGANIZED HEALTH CARE EDUCATION/TRAINING PROGRAM

## 2025-08-12 PROCEDURE — 6360000002 HC RX W HCPCS

## 2025-08-12 PROCEDURE — 6370000000 HC RX 637 (ALT 250 FOR IP)

## 2025-08-12 PROCEDURE — 36415 COLL VENOUS BLD VENIPUNCTURE: CPT

## 2025-08-12 PROCEDURE — 84132 ASSAY OF SERUM POTASSIUM: CPT

## 2025-08-12 PROCEDURE — 51798 US URINE CAPACITY MEASURE: CPT

## 2025-08-12 PROCEDURE — 80061 LIPID PANEL: CPT

## 2025-08-12 PROCEDURE — 99233 SBSQ HOSP IP/OBS HIGH 50: CPT | Performed by: HOSPITALIST

## 2025-08-12 PROCEDURE — 85520 HEPARIN ASSAY: CPT

## 2025-08-12 PROCEDURE — 80076 HEPATIC FUNCTION PANEL: CPT

## 2025-08-12 PROCEDURE — 2500000003 HC RX 250 WO HCPCS

## 2025-08-12 PROCEDURE — 76770 US EXAM ABDO BACK WALL COMP: CPT

## 2025-08-12 PROCEDURE — 80048 BASIC METABOLIC PNL TOTAL CA: CPT

## 2025-08-12 PROCEDURE — 3430000000 HC RX DIAGNOSTIC RADIOPHARMACEUTICAL: Performed by: STUDENT IN AN ORGANIZED HEALTH CARE EDUCATION/TRAINING PROGRAM

## 2025-08-12 PROCEDURE — A9500 TC99M SESTAMIBI: HCPCS | Performed by: STUDENT IN AN ORGANIZED HEALTH CARE EDUCATION/TRAINING PROGRAM

## 2025-08-12 PROCEDURE — 85025 COMPLETE CBC W/AUTO DIFF WBC: CPT

## 2025-08-12 PROCEDURE — 6360000002 HC RX W HCPCS: Performed by: STUDENT IN AN ORGANIZED HEALTH CARE EDUCATION/TRAINING PROGRAM

## 2025-08-12 RX ORDER — SODIUM CHLORIDE 0.9 % (FLUSH) 0.9 %
10 SYRINGE (ML) INJECTION PRN
Status: DISCONTINUED | OUTPATIENT
Start: 2025-08-12 | End: 2025-08-12

## 2025-08-12 RX ORDER — ATROPINE SULFATE 0.1 MG/ML
0.5 INJECTION INTRAVENOUS EVERY 5 MIN PRN
Status: DISCONTINUED | OUTPATIENT
Start: 2025-08-12 | End: 2025-08-12

## 2025-08-12 RX ORDER — TETRAKIS(2-METHOXYISOBUTYLISOCYANIDE)COPPER(I) TETRAFLUOROBORATE 1 MG/ML
39 INJECTION, POWDER, LYOPHILIZED, FOR SOLUTION INTRAVENOUS
Status: COMPLETED | OUTPATIENT
Start: 2025-08-12 | End: 2025-08-11

## 2025-08-12 RX ORDER — SODIUM CHLORIDE 9 MG/ML
500 INJECTION, SOLUTION INTRAVENOUS CONTINUOUS PRN
Status: DISCONTINUED | OUTPATIENT
Start: 2025-08-12 | End: 2025-08-12

## 2025-08-12 RX ORDER — TETRAKIS(2-METHOXYISOBUTYLISOCYANIDE)COPPER(I) TETRAFLUOROBORATE 1 MG/ML
39 INJECTION, POWDER, LYOPHILIZED, FOR SOLUTION INTRAVENOUS
Status: DISCONTINUED | OUTPATIENT
Start: 2025-08-12 | End: 2025-08-12

## 2025-08-12 RX ORDER — NITROGLYCERIN 0.4 MG/1
0.4 TABLET SUBLINGUAL EVERY 5 MIN PRN
Status: DISCONTINUED | OUTPATIENT
Start: 2025-08-12 | End: 2025-08-12

## 2025-08-12 RX ORDER — METOPROLOL TARTRATE 1 MG/ML
5 INJECTION, SOLUTION INTRAVENOUS EVERY 5 MIN PRN
Status: DISCONTINUED | OUTPATIENT
Start: 2025-08-12 | End: 2025-08-12

## 2025-08-12 RX ORDER — TAMSULOSIN HYDROCHLORIDE 0.4 MG/1
0.4 CAPSULE ORAL DAILY
Status: DISCONTINUED | OUTPATIENT
Start: 2025-08-12 | End: 2025-08-13 | Stop reason: HOSPADM

## 2025-08-12 RX ORDER — SODIUM CHLORIDE 0.9 % (FLUSH) 0.9 %
10 SYRINGE (ML) INJECTION PRN
Status: DISCONTINUED | OUTPATIENT
Start: 2025-08-12 | End: 2025-08-13 | Stop reason: HOSPADM

## 2025-08-12 RX ORDER — TETRAKIS(2-METHOXYISOBUTYLISOCYANIDE)COPPER(I) TETRAFLUOROBORATE 1 MG/ML
36.8 INJECTION, POWDER, LYOPHILIZED, FOR SOLUTION INTRAVENOUS
Status: COMPLETED | OUTPATIENT
Start: 2025-08-12 | End: 2025-08-12

## 2025-08-12 RX ORDER — AMINOPHYLLINE 25 MG/ML
50 INJECTION, SOLUTION INTRAVENOUS PRN
Status: DISCONTINUED | OUTPATIENT
Start: 2025-08-12 | End: 2025-08-12

## 2025-08-12 RX ORDER — TETRAKIS(2-METHOXYISOBUTYLISOCYANIDE)COPPER(I) TETRAFLUOROBORATE 1 MG/ML
36.8 INJECTION, POWDER, LYOPHILIZED, FOR SOLUTION INTRAVENOUS
Status: DISCONTINUED | OUTPATIENT
Start: 2025-08-12 | End: 2025-08-12

## 2025-08-12 RX ORDER — ALBUTEROL SULFATE 90 UG/1
2 INHALANT RESPIRATORY (INHALATION) PRN
Status: DISCONTINUED | OUTPATIENT
Start: 2025-08-12 | End: 2025-08-12

## 2025-08-12 RX ORDER — SODIUM CHLORIDE 0.9 % (FLUSH) 0.9 %
5-40 SYRINGE (ML) INJECTION PRN
Status: DISCONTINUED | OUTPATIENT
Start: 2025-08-12 | End: 2025-08-12

## 2025-08-12 RX ORDER — REGADENOSON 0.08 MG/ML
0.4 INJECTION, SOLUTION INTRAVENOUS
Status: COMPLETED | OUTPATIENT
Start: 2025-08-12 | End: 2025-08-12

## 2025-08-12 RX ADMIN — HEPARIN SODIUM 2000 UNITS: 1000 INJECTION INTRAVENOUS; SUBCUTANEOUS at 03:22

## 2025-08-12 RX ADMIN — SODIUM CHLORIDE, PRESERVATIVE FREE 10 ML: 5 INJECTION INTRAVENOUS at 20:22

## 2025-08-12 RX ADMIN — REGADENOSON 0.4 MG: 0.08 INJECTION, SOLUTION INTRAVENOUS at 10:45

## 2025-08-12 RX ADMIN — AMIODARONE HYDROCHLORIDE 200 MG: 200 TABLET ORAL at 09:15

## 2025-08-12 RX ADMIN — ATORVASTATIN CALCIUM 80 MG: 80 TABLET, FILM COATED ORAL at 20:20

## 2025-08-12 RX ADMIN — SODIUM CHLORIDE, PRESERVATIVE FREE 10 ML: 5 INJECTION INTRAVENOUS at 11:02

## 2025-08-12 RX ADMIN — HEPARIN SODIUM AND DEXTROSE 12 UNITS/KG/HR: 10000; 5 INJECTION INTRAVENOUS at 07:34

## 2025-08-12 RX ADMIN — METOPROLOL TARTRATE 50 MG: 50 TABLET, FILM COATED ORAL at 12:48

## 2025-08-12 RX ADMIN — SODIUM CHLORIDE, PRESERVATIVE FREE 10 ML: 5 INJECTION INTRAVENOUS at 09:18

## 2025-08-12 RX ADMIN — APIXABAN 5 MG: 5 TABLET, FILM COATED ORAL at 20:21

## 2025-08-12 RX ADMIN — LEVOTHYROXINE SODIUM 50 MCG: 0.05 TABLET ORAL at 09:15

## 2025-08-12 RX ADMIN — TETRAKIS(2-METHOXYISOBUTYLISOCYANIDE)COPPER(I) TETRAFLUOROBORATE 36.8 MILLICURIE: 1 INJECTION, POWDER, LYOPHILIZED, FOR SOLUTION INTRAVENOUS at 11:02

## 2025-08-12 RX ADMIN — SODIUM CHLORIDE, PRESERVATIVE FREE 10 ML: 5 INJECTION INTRAVENOUS at 12:55

## 2025-08-12 RX ADMIN — SODIUM CHLORIDE, PRESERVATIVE FREE 10 ML: 5 INJECTION INTRAVENOUS at 10:45

## 2025-08-12 RX ADMIN — TAMSULOSIN HYDROCHLORIDE 0.4 MG: 0.4 CAPSULE ORAL at 20:20

## 2025-08-12 RX ADMIN — PANTOPRAZOLE SODIUM 40 MG: 40 TABLET, DELAYED RELEASE ORAL at 09:15

## 2025-08-12 ASSESSMENT — ENCOUNTER SYMPTOMS
SHORTNESS OF BREATH: 0
GASTROINTESTINAL NEGATIVE: 1
COUGH: 0
ALLERGIC/IMMUNOLOGIC NEGATIVE: 1
EYES NEGATIVE: 1
ABDOMINAL DISTENTION: 0
CONSTIPATION: 0
RESPIRATORY NEGATIVE: 1
VOMITING: 0
ABDOMINAL PAIN: 0
DIARRHEA: 0

## 2025-08-13 VITALS
DIASTOLIC BLOOD PRESSURE: 87 MMHG | BODY MASS INDEX: 37.8 KG/M2 | RESPIRATION RATE: 20 BRPM | TEMPERATURE: 97.9 F | OXYGEN SATURATION: 99 % | HEART RATE: 54 BPM | HEIGHT: 71 IN | SYSTOLIC BLOOD PRESSURE: 138 MMHG | WEIGHT: 270 LBS

## 2025-08-13 PROBLEM — N17.9 AKI (ACUTE KIDNEY INJURY): Status: ACTIVE | Noted: 2025-08-13

## 2025-08-13 LAB
AMPHET UR QL SCN: NEGATIVE
ANION GAP SERPL CALCULATED.3IONS-SCNC: 9 MMOL/L (ref 9–16)
BARBITURATES UR QL SCN: NEGATIVE
BASOPHILS # BLD: 0.05 K/UL (ref 0–0.2)
BASOPHILS NFR BLD: 1 % (ref 0–2)
BENZODIAZ UR QL: NEGATIVE
BUN SERPL-MCNC: 28 MG/DL (ref 8–23)
CALCIUM SERPL-MCNC: 8.8 MG/DL (ref 8.6–10.4)
CANNABINOIDS UR QL SCN: NEGATIVE
CHLORIDE SERPL-SCNC: 103 MMOL/L (ref 98–107)
CO2 SERPL-SCNC: 20 MMOL/L (ref 20–31)
COCAINE UR QL SCN: NEGATIVE
CREAT SERPL-MCNC: 1.6 MG/DL (ref 0.7–1.2)
CREAT UR-MCNC: 61.8 MG/DL (ref 39–259)
EOSINOPHIL # BLD: 0.1 K/UL (ref 0–0.44)
EOSINOPHILS RELATIVE PERCENT: 2 % (ref 1–4)
ERYTHROCYTE [DISTWIDTH] IN BLOOD BY AUTOMATED COUNT: 12.7 % (ref 11.8–14.4)
FENTANYL UR QL: NEGATIVE
GFR, ESTIMATED: 49 ML/MIN/1.73M2
GLUCOSE SERPL-MCNC: 179 MG/DL (ref 74–99)
HCT VFR BLD AUTO: 46.4 % (ref 40.7–50.3)
HGB BLD-MCNC: 14.8 G/DL (ref 13–17)
IMM GRANULOCYTES # BLD AUTO: <0.03 K/UL (ref 0–0.3)
IMM GRANULOCYTES NFR BLD: 0 %
LYMPHOCYTES NFR BLD: 0.78 K/UL (ref 1.1–3.7)
LYMPHOCYTES RELATIVE PERCENT: 14 % (ref 24–43)
MCH RBC QN AUTO: 30.9 PG (ref 25.2–33.5)
MCHC RBC AUTO-ENTMCNC: 31.9 G/DL (ref 28.4–34.8)
MCV RBC AUTO: 96.9 FL (ref 82.6–102.9)
METHADONE UR QL: NEGATIVE
MONOCYTES NFR BLD: 0.3 K/UL (ref 0.1–1.2)
MONOCYTES NFR BLD: 5 % (ref 3–12)
NEUTROPHILS NFR BLD: 78 % (ref 36–65)
NEUTS SEG NFR BLD: 4.3 K/UL (ref 1.5–8.1)
NRBC BLD-RTO: 0 PER 100 WBC
OPIATES UR QL SCN: NEGATIVE
OXYCODONE UR QL SCN: NEGATIVE
PCP UR QL SCN: NEGATIVE
PLATELET # BLD AUTO: 111 K/UL (ref 138–453)
PMV BLD AUTO: 12.2 FL (ref 8.1–13.5)
POTASSIUM SERPL-SCNC: 4.8 MMOL/L (ref 3.7–5.3)
RBC # BLD AUTO: 4.79 M/UL (ref 4.21–5.77)
SODIUM SERPL-SCNC: 132 MMOL/L (ref 136–145)
SODIUM UR-SCNC: 57 MMOL/L
TEST INFORMATION: NORMAL
WBC OTHER # BLD: 5.5 K/UL (ref 3.5–11.3)

## 2025-08-13 PROCEDURE — 6370000000 HC RX 637 (ALT 250 FOR IP): Performed by: NURSE PRACTITIONER

## 2025-08-13 PROCEDURE — 82570 ASSAY OF URINE CREATININE: CPT

## 2025-08-13 PROCEDURE — 6370000000 HC RX 637 (ALT 250 FOR IP)

## 2025-08-13 PROCEDURE — 84300 ASSAY OF URINE SODIUM: CPT

## 2025-08-13 PROCEDURE — 80048 BASIC METABOLIC PNL TOTAL CA: CPT

## 2025-08-13 PROCEDURE — 36415 COLL VENOUS BLD VENIPUNCTURE: CPT

## 2025-08-13 PROCEDURE — 6370000000 HC RX 637 (ALT 250 FOR IP): Performed by: HOSPITALIST

## 2025-08-13 PROCEDURE — 99239 HOSP IP/OBS DSCHRG MGMT >30: CPT | Performed by: HOSPITALIST

## 2025-08-13 PROCEDURE — 80307 DRUG TEST PRSMV CHEM ANLYZR: CPT

## 2025-08-13 PROCEDURE — 2500000003 HC RX 250 WO HCPCS

## 2025-08-13 PROCEDURE — 85025 COMPLETE CBC W/AUTO DIFF WBC: CPT

## 2025-08-13 RX ORDER — HYDRALAZINE HYDROCHLORIDE 25 MG/1
25 TABLET, FILM COATED ORAL EVERY 8 HOURS SCHEDULED
Qty: 90 TABLET | Refills: 3 | Status: SHIPPED | OUTPATIENT
Start: 2025-08-13

## 2025-08-13 RX ORDER — ISOSORBIDE DINITRATE 10 MG/1
10 TABLET ORAL 3 TIMES DAILY
Qty: 90 TABLET | Refills: 3 | Status: SHIPPED | OUTPATIENT
Start: 2025-08-13

## 2025-08-13 RX ORDER — ISOSORBIDE DINITRATE 10 MG/1
10 TABLET ORAL 3 TIMES DAILY
Status: DISCONTINUED | OUTPATIENT
Start: 2025-08-13 | End: 2025-08-13 | Stop reason: HOSPADM

## 2025-08-13 RX ORDER — FUROSEMIDE 20 MG/1
20 TABLET ORAL DAILY
Qty: 60 TABLET | Refills: 3 | Status: SHIPPED | OUTPATIENT
Start: 2025-08-13

## 2025-08-13 RX ORDER — FUROSEMIDE 20 MG/1
20 TABLET ORAL DAILY
Status: DISCONTINUED | OUTPATIENT
Start: 2025-08-13 | End: 2025-08-13 | Stop reason: HOSPADM

## 2025-08-13 RX ORDER — HYDRALAZINE HYDROCHLORIDE 50 MG/1
25 TABLET, FILM COATED ORAL EVERY 8 HOURS SCHEDULED
Status: DISCONTINUED | OUTPATIENT
Start: 2025-08-13 | End: 2025-08-13 | Stop reason: HOSPADM

## 2025-08-13 RX ORDER — TAMSULOSIN HYDROCHLORIDE 0.4 MG/1
0.4 CAPSULE ORAL DAILY
Qty: 30 CAPSULE | Refills: 3 | Status: SHIPPED | OUTPATIENT
Start: 2025-08-14

## 2025-08-13 RX ADMIN — METOPROLOL TARTRATE 50 MG: 50 TABLET, FILM COATED ORAL at 08:37

## 2025-08-13 RX ADMIN — TAMSULOSIN HYDROCHLORIDE 0.4 MG: 0.4 CAPSULE ORAL at 08:37

## 2025-08-13 RX ADMIN — FUROSEMIDE 20 MG: 20 TABLET ORAL at 13:12

## 2025-08-13 RX ADMIN — AMIODARONE HYDROCHLORIDE 200 MG: 200 TABLET ORAL at 08:37

## 2025-08-13 RX ADMIN — APIXABAN 5 MG: 5 TABLET, FILM COATED ORAL at 08:37

## 2025-08-13 RX ADMIN — SODIUM CHLORIDE, PRESERVATIVE FREE 10 ML: 5 INJECTION INTRAVENOUS at 08:36

## 2025-08-13 RX ADMIN — LEVOTHYROXINE SODIUM 50 MCG: 0.05 TABLET ORAL at 06:17

## 2025-08-13 RX ADMIN — ISOSORBIDE DINITRATE 10 MG: 10 TABLET ORAL at 13:12

## 2025-08-13 RX ADMIN — METOPROLOL TARTRATE 50 MG: 50 TABLET, FILM COATED ORAL at 01:16

## 2025-08-13 RX ADMIN — HYDRALAZINE HYDROCHLORIDE 25 MG: 50 TABLET ORAL at 13:12

## 2025-08-13 RX ADMIN — PANTOPRAZOLE SODIUM 40 MG: 40 TABLET, DELAYED RELEASE ORAL at 06:18

## 2025-08-13 ASSESSMENT — ENCOUNTER SYMPTOMS
EYES NEGATIVE: 1
VOMITING: 0
RESPIRATORY NEGATIVE: 1
DIARRHEA: 0
ABDOMINAL PAIN: 0
ABDOMINAL DISTENTION: 0
SHORTNESS OF BREATH: 0
CONSTIPATION: 0
COUGH: 0
GASTROINTESTINAL NEGATIVE: 1
ALLERGIC/IMMUNOLOGIC NEGATIVE: 1

## 2025-08-26 ENCOUNTER — HOSPITAL ENCOUNTER (OUTPATIENT)
Age: 60
Setting detail: SPECIMEN
Discharge: HOME | End: 2025-08-26

## 2025-08-26 DIAGNOSIS — I48.19 PERSISTENT ATRIAL FIBRILLATION (HCC): ICD-10-CM

## 2025-08-26 DIAGNOSIS — I25.84 CORONARY ARTERY DISEASE DUE TO CALCIFIED CORONARY LESION: ICD-10-CM

## 2025-08-26 DIAGNOSIS — I25.10 CORONARY ARTERY DISEASE DUE TO CALCIFIED CORONARY LESION: ICD-10-CM

## 2025-08-26 LAB
ANION GAP SERPL CALCULATED.3IONS-SCNC: 11 MMOL/L (ref 9–16)
BUN SERPL-MCNC: 28 MG/DL (ref 8–23)
CALCIUM SERPL-MCNC: 9.7 MG/DL (ref 8.6–10.4)
CHLORIDE SERPL-SCNC: 102 MMOL/L (ref 98–107)
CO2 SERPL-SCNC: 23 MMOL/L (ref 20–31)
CREAT SERPL-MCNC: 1.5 MG/DL (ref 0.7–1.2)
DATE LAST DOSE: ABNORMAL
DIGOXIN DOSE TIME: ABNORMAL
DIGOXIN DOSE: ABNORMAL MG
DIGOXIN SERPL-MCNC: 0.4 NG/ML (ref 0.8–2)
GFR, ESTIMATED: 53 ML/MIN/1.73M2
GLUCOSE SERPL-MCNC: 112 MG/DL (ref 74–99)
POTASSIUM SERPL-SCNC: 5.8 MMOL/L (ref 3.7–5.3)
SODIUM SERPL-SCNC: 136 MMOL/L (ref 136–145)